# Patient Record
Sex: FEMALE | Race: BLACK OR AFRICAN AMERICAN | NOT HISPANIC OR LATINO | Employment: OTHER | URBAN - METROPOLITAN AREA
[De-identification: names, ages, dates, MRNs, and addresses within clinical notes are randomized per-mention and may not be internally consistent; named-entity substitution may affect disease eponyms.]

---

## 2017-06-22 DIAGNOSIS — R09.82 POSTNASAL DRIP: ICD-10-CM

## 2017-06-22 RX ORDER — MONTELUKAST SODIUM 10 MG/1
TABLET ORAL
Qty: 90 TABLET | Refills: 0 | Status: SHIPPED | OUTPATIENT
Start: 2017-06-22 | End: 2020-03-18 | Stop reason: SDUPTHER

## 2019-05-01 ENCOUNTER — OFFICE VISIT (OUTPATIENT)
Dept: INTERNAL MEDICINE | Facility: CLINIC | Age: 69
End: 2019-05-01
Payer: MEDICARE

## 2019-05-01 VITALS
BODY MASS INDEX: 44.01 KG/M2 | SYSTOLIC BLOOD PRESSURE: 126 MMHG | WEIGHT: 273.81 LBS | HEIGHT: 66 IN | TEMPERATURE: 98 F | HEART RATE: 94 BPM | DIASTOLIC BLOOD PRESSURE: 75 MMHG | RESPIRATION RATE: 16 BRPM

## 2019-05-01 DIAGNOSIS — J45.909 ASTHMA, UNSPECIFIED ASTHMA SEVERITY, UNSPECIFIED WHETHER COMPLICATED, UNSPECIFIED WHETHER PERSISTENT: ICD-10-CM

## 2019-05-01 DIAGNOSIS — Z12.31 ENCOUNTER FOR SCREENING MAMMOGRAM FOR MALIGNANT NEOPLASM OF BREAST: ICD-10-CM

## 2019-05-01 DIAGNOSIS — E55.9 VITAMIN D DEFICIENCY: ICD-10-CM

## 2019-05-01 DIAGNOSIS — M85.9 DISORDER OF BONE DENSITY AND STRUCTURE, UNSPECIFIED: ICD-10-CM

## 2019-05-01 DIAGNOSIS — I10 ESSENTIAL HYPERTENSION: ICD-10-CM

## 2019-05-01 DIAGNOSIS — I10 HYPERTENSION, UNSPECIFIED TYPE: Chronic | ICD-10-CM

## 2019-05-01 DIAGNOSIS — Z00.00 ANNUAL PHYSICAL EXAM: Primary | ICD-10-CM

## 2019-05-01 DIAGNOSIS — R73.03 PRE-DIABETES: ICD-10-CM

## 2019-05-01 DIAGNOSIS — E66.01 MORBID OBESITY: ICD-10-CM

## 2019-05-01 PROCEDURE — 90670 PNEUMOCOCCAL CONJUGATE VACCINE 13-VALENT LESS THAN 5YO & GREATER THAN: ICD-10-PCS | Mod: S$GLB,,, | Performed by: INTERNAL MEDICINE

## 2019-05-01 PROCEDURE — 3078F DIAST BP <80 MM HG: CPT | Mod: CPTII,GC,S$GLB, | Performed by: STUDENT IN AN ORGANIZED HEALTH CARE EDUCATION/TRAINING PROGRAM

## 2019-05-01 PROCEDURE — G0009 ADMIN PNEUMOCOCCAL VACCINE: HCPCS | Mod: S$GLB,,, | Performed by: INTERNAL MEDICINE

## 2019-05-01 PROCEDURE — 99387 INIT PM E/M NEW PAT 65+ YRS: CPT | Mod: 25,GC,S$GLB, | Performed by: STUDENT IN AN ORGANIZED HEALTH CARE EDUCATION/TRAINING PROGRAM

## 2019-05-01 PROCEDURE — 3074F SYST BP LT 130 MM HG: CPT | Mod: CPTII,GC,S$GLB, | Performed by: STUDENT IN AN ORGANIZED HEALTH CARE EDUCATION/TRAINING PROGRAM

## 2019-05-01 PROCEDURE — 99999 PR PBB SHADOW E&M-EST. PATIENT-LVL V: CPT | Mod: PBBFAC,GC,, | Performed by: STUDENT IN AN ORGANIZED HEALTH CARE EDUCATION/TRAINING PROGRAM

## 2019-05-01 PROCEDURE — 3078F PR MOST RECENT DIASTOLIC BLOOD PRESSURE < 80 MM HG: ICD-10-PCS | Mod: CPTII,GC,S$GLB, | Performed by: STUDENT IN AN ORGANIZED HEALTH CARE EDUCATION/TRAINING PROGRAM

## 2019-05-01 PROCEDURE — G0009 PNEUMOCOCCAL CONJUGATE VACCINE 13-VALENT LESS THAN 5YO & GREATER THAN: ICD-10-PCS | Mod: S$GLB,,, | Performed by: INTERNAL MEDICINE

## 2019-05-01 PROCEDURE — 3074F PR MOST RECENT SYSTOLIC BLOOD PRESSURE < 130 MM HG: ICD-10-PCS | Mod: CPTII,GC,S$GLB, | Performed by: STUDENT IN AN ORGANIZED HEALTH CARE EDUCATION/TRAINING PROGRAM

## 2019-05-01 PROCEDURE — 99999 PR PBB SHADOW E&M-EST. PATIENT-LVL V: ICD-10-PCS | Mod: PBBFAC,GC,, | Performed by: STUDENT IN AN ORGANIZED HEALTH CARE EDUCATION/TRAINING PROGRAM

## 2019-05-01 PROCEDURE — 90670 PCV13 VACCINE IM: CPT | Mod: S$GLB,,, | Performed by: INTERNAL MEDICINE

## 2019-05-01 PROCEDURE — 99387 PR PREVENTIVE VISIT,NEW,65 & OVER: ICD-10-PCS | Mod: 25,GC,S$GLB, | Performed by: STUDENT IN AN ORGANIZED HEALTH CARE EDUCATION/TRAINING PROGRAM

## 2019-05-01 RX ORDER — FLUOXETINE 10 MG/1
20 CAPSULE ORAL DAILY
COMMUNITY
End: 2019-05-01 | Stop reason: SDUPTHER

## 2019-05-01 RX ORDER — ALBUTEROL SULFATE 90 UG/1
2 AEROSOL, METERED RESPIRATORY (INHALATION) EVERY 6 HOURS PRN
Qty: 8 G | Refills: 2 | Status: SHIPPED | OUTPATIENT
Start: 2019-05-01 | End: 2021-05-04

## 2019-05-01 RX ORDER — ERGOCALCIFEROL 1.25 MG/1
50000 CAPSULE ORAL
Qty: 30 CAPSULE | Refills: 2 | Status: SHIPPED | OUTPATIENT
Start: 2019-05-01 | End: 2020-02-26 | Stop reason: SDUPTHER

## 2019-05-01 RX ORDER — FLUOXETINE 10 MG/1
20 CAPSULE ORAL DAILY
Qty: 30 CAPSULE | Refills: 2 | Status: SHIPPED | OUTPATIENT
Start: 2019-05-01 | End: 2019-05-01 | Stop reason: SDUPTHER

## 2019-05-01 NOTE — PROGRESS NOTES
69 y.o. former smoker, non- alcohol drinker here for annual exam.     Last seen by PCP, last year in September 2018, Dr. Arriaga. Switched because insurance changed. From [place], but originally from [place]. Lives mostly in [place], but has children who she visits regularly in [place] and [place].   Would like to establish care with PCP at [facility] today. No fasting labs available, first encounter.     Low vitamin D- supplements with ergo  Elevated A1c 6.4/IZABEL/obesity/prediabetes  - Has a CPAP at home. Does not use it. Dropped it and worried it is broken.   Asthma- fluticasone/montelukast, No PFTs  - has been tested for it. Has been controlled.   - Never had a exacerbation.   - Does not have rescue inhalor at home. But does report that she has not needed one.     Cardiovascular:  Cholesterol (q5yr>19yo):   Hyperlipidemia: High Total cholesterol (), not on statin  TC: 208 in 2014 LDL: labs pending HDL: labs pending   HTN: 126/75 in office, does not measure BP at home. She does have a forearm cuff at home but never uses. home meds: Hyzaar- 100/25mg    Risk: [id number] pack-year smoker, quit 20 years, at least 5 cigarettes a day for 30 years, Drinks alcohol rarely  Current ASCVD 10- year risk: Based on Lipid Panel 2014 and assumption of pre-diabetes--->24.1%, HIGH RISK  Prior stents, CABG, caths: Has had cath assessment 2 years ago, in [place]. Unclear if had stable CAD at that time. May have been after having a stress test.   Fhx: + CHF and bad coronary artery disease (Dad, and brother). Brother has stents (2-3), 73 yo. Dad- passed away from CHF.     Endocrine:  Diabetes: was prediabetic, Current A1c (40-70 yearly): 6.4 (4 years ago)   Diabetes Medications: None currently  Glucose at home: NA  Thyroid: None    Health Maintenance:  Recent hospital/ED admission: None recent  Eye exam: Does not have one.   Mammogram (50-75yo): Last Mammogram was 2-3 years ago. It was normal on previous imaging. Has had a  biopsy for a nodule/mass on the Right breast.   WWE: Dr. Langley. If she needs visits with her, she typically calls to make an appointment.   Colonoscopy:Has had one in 2017, she reports that it was normal, no polyps.  Was also in  [place]. No longer takes Aspirin due to prior history of GI bleed.   DEXA (F>66 yo, M >69yo, M&F 50-68 yo with risk factors):   Exercise: Does not exercise. She admits that she is lazy and sometimes doesn't get out.   Diet: Trying to eat better. Tried cutting out carbs and sugar.   Hep C- not previously tested.     Vaccines:  Influenza (everyone, yearly): Did not get it last year.   Tetanus: Tdap x 1 UTD, Td booster ([id number]) UTD    Pneumonia (vaccinated with PPSV23 after 64yo):  Pneumovax (PPSV23)(>64yo): 5/6/2015  Prevnar (PCV13) (at least 1 year after PPSV23): Will give today    Shingrex (Recomb, 2-6 months between doses): Dose 1 has not had , Dose 2 has not had     ADL's: Yes, able to do all things  Memory: No problems   Mental health: Good. Normal. Poor sleep.   Advance Directives: Not addressed.   Nutrition: Eats well. Working on diet.   Gait: Able to walk.   Safety: Yes feels save  Urinary incontinence: None    Review of Systems   Constitutional: Negative for chills, fever, malaise/fatigue and weight loss.   HENT: Negative for congestion, sinus pain and sore throat.    Eyes: Negative for blurred vision and pain.   Respiratory: Negative for cough, shortness of breath and wheezing.    Cardiovascular: Negative for chest pain, palpitations, orthopnea, claudication and leg swelling.   Gastrointestinal: Negative for abdominal pain, constipation, diarrhea, heartburn, melena, nausea and vomiting.   Genitourinary: Negative for dysuria, frequency, hematuria and urgency.   Skin: Negative for itching and rash.   Neurological: Negative for dizziness, seizures, loss of consciousness and headaches.   Endo/Heme/Allergies: Negative for environmental allergies and polydipsia. Does not bruise/bleed  easily.   Psychiatric/Behavioral: Negative for depression. The patient is not nervous/anxious.          Current Outpatient Medications:     ergocalciferol (VITAMIN D2) 50,000 unit Cap, Take 50,000 Units by mouth every 7 days., Disp: , Rfl:     fluticasone (FLONASE) 50 mcg/actuation nasal spray, 1 spray by Each Nare route once daily., Disp: 16 g, Rfl: 11    losartan-hydrochlorothiazide 100-25 mg (HYZAAR) 100-25 mg per tablet, Take 1 tablet by mouth once daily., Disp: 30 tablet, Rfl: 6    montelukast (SINGULAIR) 10 mg tablet, TAKE 1 TABLET BY MOUTH EVERY EVENING, Disp: 90 tablet, Rfl: 0    pantoprazole (PROTONIX) 20 MG tablet, Take 2 tablets (40 mg total) by mouth once daily. for 14 days, Disp: 14 tablet, Rfl: 0    Past Medical History:   Diagnosis Date    Asthma     Hyperlipidemia     Hypertension     Pre-diabetes      Past Surgical History:   Procedure Laterality Date    PARTIAL HYSTERECTOMY      uterus removed     Social History     Socioeconomic History    Marital status: Single     Spouse name: Not on file    Number of children: Not on file    Years of education: Not on file    Highest education level: Not on file   Occupational History    Not on file   Social Needs    Financial resource strain: Not on file    Food insecurity:     Worry: Not on file     Inability: Not on file    Transportation needs:     Medical: Not on file     Non-medical: Not on file   Tobacco Use    Smoking status: Former Smoker     Last attempt to quit: 1999     Years since quittin.9    Smokeless tobacco: Never Used   Substance and Sexual Activity    Alcohol use: Yes     Comment: socially/ rare    Drug use: No    Sexual activity: Not Currently   Lifestyle    Physical activity:     Days per week: Not on file     Minutes per session: Not on file    Stress: Not on file   Relationships    Social connections:     Talks on phone: Not on file     Gets together: Not on file     Attends Caodaism service: Not on  file     Active member of club or organization: Not on file     Attends meetings of clubs or organizations: Not on file     Relationship status: Not on file   Other Topics Concern    Not on file   Social History Narrative    Not on file     Review of patient's allergies indicates:  No Known Allergies  Amanda James had no medications administered during this visit.    Vitals:    05/01/19 1259   BP: 126/75   Pulse: 94   Resp: 16   Temp: 98 °F (36.7 °C)     Physical Exam   Constitutional: She is oriented to person, place, and time. She appears well-developed. No distress.   Morbidly obese   HENT:   Head: Normocephalic.   Eyes: Pupils are equal, round, and reactive to light. Conjunctivae and EOM are normal. No scleral icterus.   Neck: No JVD present.   Cardiovascular: Normal rate and regular rhythm.   No murmur heard.  Pulmonary/Chest: Effort normal and breath sounds normal. She has no wheezes. She has no rales.   Abdominal: Soft.   Musculoskeletal: Normal range of motion. She exhibits edema.   1+ pitting edema     Neurological: She is alert and oriented to person, place, and time. No cranial nerve deficit. Coordination normal.   Dizzy with standing due to imbalance   Psychiatric: She has a normal mood and affect.   Nursing note and vitals reviewed.      Ms James is a 70 yo woman here for annual physical exam.     Assessment:  -Annual physical exam  -Morbid obesity  -Hypertension, unspecified type  -Pre-diabetes  -Vitamin D deficiency  -Hypertension   -Encounter for screening mammogram for malignant neoplasm of breast   -Disorder of bone density and structure, unspecified   -Asthma  -----------------------------------------------------------------------  Plan:    Annual physical exam  -     Comprehensive metabolic panel; Future  -     CBC auto differential; Future  -     TSH; Future  -     Lipid panel; Future  -     HEMOGLOBIN A1C; Future  -     MICROALBUMIN / CREATININE RATIO URINE  -     Urinalysis w/reflex  to Microscopic; Future  -     Hepatitis C antibody; Future  -     Mammo Digital Screening Bilateral With CAD; Future  -     Ambulatory Referral to Nutrition - [facility]  -     DXA Bone Density Appendicular Skeleton; Future  -     Urinalysis w/reflex to Microscopic  - Plan for health maintenance- mammogram, DEXA  - Hepatitis C screening with fasting labs  - Recommended to see optometry for annual exam- patient will call to make appointment  - Referral to nutrition/[facility]  - Will re-evaluate HbA1c- if indicated will refer to diabetic education.   - Prevnar 13 today.     Morbid obesity  - Couselled on diet and nutrition. Encouraged to move/walk at least 3 times a week for 20 minutes at a time  - Referred to [facility]    Hypertension, unspecified type  -     Urinalysis w/reflex to Microscopic; Future  -     Urinalysis w/reflex to Microscopic  - Controlled today. Continue home BP medication, hyzaar    Pre-diabetes  -     Lipid panel; Future  -     HEMOGLOBIN A1C; Future    Vitamin D deficiency  -     Vitamin D; Future    Hypertension   -     TSH; Future  -     Lipid panel; Future    Encounter for screening mammogram for malignant neoplasm of breast   -     Mammo Digital Screening Bilateral With CAD; Future    Disorder of bone density and structure, unspecified   -     DXA Bone Density Appendicular Skeleton; Future    Asthma  - No official diagnosis, never had PFTs/spirometry  - Currently reports no exacerbations while on montelukast and flonase  - Continue above. Provided rescue inhaler.   - Consider pulmonary consult if patient becomes symptomatic.     Other orders  -     ergocalciferol (VITAMIN D2) 50,000 unit Cap; Take 1 capsule (50,000 Units total) by mouth every 7 days.  -     albuterol (VENTOLIN HFA) 90 mcg/actuation inhaler; Inhale 2 puffs into the lungs every 6 (six) hours as needed for Wheezing. Rescue  -     FLUoxetine 10 MG capsule; Take 2 capsules (20 mg total) by mouth once daily.  -     (In Office  Administered) Pneumococcal Conjugate Vaccine (13 Valent) (IM)    - Follow up in 6 months for management of chronic medical conditions. Will update with lab results.     Onofre Jarvis MD  PGY2 Medicine

## 2019-05-07 RX ORDER — FLUOXETINE 10 MG/1
CAPSULE ORAL
Qty: 180 CAPSULE | Refills: 2 | Status: SHIPPED | OUTPATIENT
Start: 2019-05-07 | End: 2020-08-28 | Stop reason: SDUPTHER

## 2019-05-19 ENCOUNTER — OFFICE VISIT (OUTPATIENT)
Dept: URGENT CARE | Facility: CLINIC | Age: 69
End: 2019-05-19
Payer: MEDICARE

## 2019-05-19 VITALS
TEMPERATURE: 97 F | OXYGEN SATURATION: 99 % | DIASTOLIC BLOOD PRESSURE: 79 MMHG | RESPIRATION RATE: 18 BRPM | HEIGHT: 65 IN | BODY MASS INDEX: 45.48 KG/M2 | SYSTOLIC BLOOD PRESSURE: 139 MMHG | WEIGHT: 273 LBS | HEART RATE: 100 BPM

## 2019-05-19 DIAGNOSIS — M54.50 ACUTE LEFT-SIDED LOW BACK PAIN WITHOUT SCIATICA: Primary | ICD-10-CM

## 2019-05-19 DIAGNOSIS — M62.830 BACK SPASM: ICD-10-CM

## 2019-05-19 DIAGNOSIS — R35.0 URINARY FREQUENCY: ICD-10-CM

## 2019-05-19 DIAGNOSIS — E66.01 CLASS 3 SEVERE OBESITY WITH BODY MASS INDEX (BMI) OF 45.0 TO 49.9 IN ADULT, UNSPECIFIED OBESITY TYPE, UNSPECIFIED WHETHER SERIOUS COMORBIDITY PRESENT: ICD-10-CM

## 2019-05-19 LAB
BILIRUB UR QL STRIP: NEGATIVE
GLUCOSE UR QL STRIP: NEGATIVE
KETONES UR QL STRIP: NEGATIVE
LEUKOCYTE ESTERASE UR QL STRIP: NEGATIVE
PH, POC UA: 7 (ref 5–8)
POC BLOOD, URINE: NEGATIVE
POC NITRATES, URINE: NEGATIVE
PROT UR QL STRIP: NEGATIVE
SP GR UR STRIP: 1.01 (ref 1–1.03)
UROBILINOGEN UR STRIP-ACNC: NEGATIVE (ref 0.1–1.1)

## 2019-05-19 PROCEDURE — 3075F SYST BP GE 130 - 139MM HG: CPT | Mod: CPTII,S$GLB,, | Performed by: NURSE PRACTITIONER

## 2019-05-19 PROCEDURE — 81003 POCT URINALYSIS, DIPSTICK, AUTOMATED, W/O SCOPE: ICD-10-PCS | Mod: QW,S$GLB,, | Performed by: NURSE PRACTITIONER

## 2019-05-19 PROCEDURE — 3078F DIAST BP <80 MM HG: CPT | Mod: CPTII,S$GLB,, | Performed by: NURSE PRACTITIONER

## 2019-05-19 PROCEDURE — 1101F PT FALLS ASSESS-DOCD LE1/YR: CPT | Mod: CPTII,S$GLB,, | Performed by: NURSE PRACTITIONER

## 2019-05-19 PROCEDURE — 1101F PR PT FALLS ASSESS DOC 0-1 FALLS W/OUT INJ PAST YR: ICD-10-PCS | Mod: CPTII,S$GLB,, | Performed by: NURSE PRACTITIONER

## 2019-05-19 PROCEDURE — 3078F PR MOST RECENT DIASTOLIC BLOOD PRESSURE < 80 MM HG: ICD-10-PCS | Mod: CPTII,S$GLB,, | Performed by: NURSE PRACTITIONER

## 2019-05-19 PROCEDURE — 81003 URINALYSIS AUTO W/O SCOPE: CPT | Mod: QW,S$GLB,, | Performed by: NURSE PRACTITIONER

## 2019-05-19 PROCEDURE — 99214 PR OFFICE/OUTPT VISIT, EST, LEVL IV, 30-39 MIN: ICD-10-PCS | Mod: 25,S$GLB,, | Performed by: NURSE PRACTITIONER

## 2019-05-19 PROCEDURE — 99214 OFFICE O/P EST MOD 30 MIN: CPT | Mod: 25,S$GLB,, | Performed by: NURSE PRACTITIONER

## 2019-05-19 PROCEDURE — 3075F PR MOST RECENT SYSTOLIC BLOOD PRESS GE 130-139MM HG: ICD-10-PCS | Mod: CPTII,S$GLB,, | Performed by: NURSE PRACTITIONER

## 2019-05-19 RX ORDER — TIZANIDINE 4 MG/1
TABLET ORAL
Qty: 20 TABLET | Refills: 0 | Status: SHIPPED | OUTPATIENT
Start: 2019-05-19 | End: 2021-05-04

## 2019-05-19 RX ORDER — FLUOXETINE 10 MG/1
CAPSULE ORAL
Qty: 180 CAPSULE | Refills: 2 | OUTPATIENT
Start: 2019-05-19

## 2019-05-19 RX ORDER — TIZANIDINE 4 MG/1
4 TABLET ORAL EVERY 12 HOURS PRN
Qty: 20 TABLET | Refills: 0 | Status: SHIPPED | OUTPATIENT
Start: 2019-05-19 | End: 2019-05-19 | Stop reason: SDUPTHER

## 2019-05-19 NOTE — PATIENT INSTRUCTIONS
Please take 1,000 mg of acetaminophen (Tylenol) every 8 hours as needed for pain.    If your pain worsens, or if you develop numbness or tingling, please follow up with your Primary Provider.    If you develop numbness of your upper inner thighs and incontinence ( involuntary urinating or defecating on yourself), please call 911 and go immediately to the Emergency Department.      Back Pain (Acute or Chronic)    Back pain is one of the most common problems. The good news is that most people feel better in 1 to 2 weeks, and most of the rest in 1 to 2 months. Most people can remain active.  People experience and describe pain differently; not everyone is the same.  · The pain can be sharp, stabbing, shooting, aching, cramping or burning.  · Movement, standing, bending, lifting, sitting, or walking may worsen pain.  · It can be localized to one spot or area, or it can be more generalized.  · It can spread or radiate upwards, to the front, or go down your arms or legs (sciatica).  · It can cause muscle spasm.  Most of the time, mechanical problems with the muscles or spine cause the pain. Mechanical problems are usually caused by an injury to the muscles or ligaments. While illness can cause back pain, it is usually not caused by a serious illness. Mechanical problems include:   · Physical activity such as sports, exercise, work, or normal activity  · Overexertion, lifting, pushing, pulling incorrectly or too aggressively  · Sudden twisting, bending, or stretching from an accident, or accidental movement  · Poor posture  · Stretching or moving wrong, without noticing pain at the time  · Poor coordination, lack of regular exercise (check with your doctor about this)  · Spinal disc disease or arthritis  · Stress  Pain can also be related to pregnancy, or illness like appendicitis, bladder or kidney infections, pelvic infections, and many other things.  Acute back pain usually gets better in 1 to 2 weeks. Back pain related  to disk disease, arthritis in the spinal joints or spinal stenosis (narrowing of the spinal canal) can become chronic and last for months or years.  Unless you had a physical injury (for example, a car accident or fall) X-rays are usually not needed for the initial evaluation of back pain. If pain continues and does not respond to medical treatment, X-rays and other tests may be needed.  Home care  Try these home care recommendations:  · When in bed, try to find a position of comfort. A firm mattress is best. Try lying flat on your back with pillows under your knees. You can also try lying on your side with your knees bent up towards your chest and a pillow between your knees.  · At first, do not try to stretch out the sore spots. If there is a strain, it is not like the good soreness you get after exercising without an injury. In this case, stretching may make it worse.  · Avoid prolong sitting, long car rides, or travel. This puts more stress on the lower back than standing or walking.  · During the first 24 to 72 hours after an acute injury or flare up of chronic back pain, apply an ice pack to the painful area for 20 minutes and then remove it for 20 minutes. Do this over a period of 60 to 90 minutes or several times a day. This will reduce swelling and pain. Wrap the ice pack in a thin towel or plastic to protect your skin.  · You can start with ice, then switch to heat. Heat (hot shower, hot bath, or heating pad) reduces pain and works well for muscle spasms. Heat can be applied to the painful area for 20 minutes then remove it for 20 minutes. Do this over a period of 60 to 90 minutes or several times a day. Do not sleep on a heating pad. It can lead to skin burns or tissue damage.  · You can alternate ice and heat therapy. Talk with your doctor about the best treatment for your back pain.  · Therapeutic massage can help relax the back muscles without stretching them.  · Be aware of safe lifting methods and do  not lift anything without stretching first.  Medicines  Talk to your doctor before using medicine, especially if you have other medical problems or are taking other medicines.  · You may use over-the-counter medicine as directed on the bottle to control pain, unless another pain medicine was prescribed. If you have chronic conditions like diabetes, liver or kidney disease, stomach ulcers, or gastrointestinal bleeding, or are taking blood thinners, talk to your doctor before taking any medicine.  · Be careful if you are given a prescription medicines, narcotics, or medicine for muscle spasms. They can cause drowsiness, affect your coordination, reflexes, and judgement. Do not drive or operate heavy machinery.  Follow-up care  Follow up with your healthcare provider, or as advised.   A radiologist will review any X-rays that were taken. Your provide will notify you of any new findings that may affect your care.  Call 911  Call emergency services if any of the following occur:  · Trouble breathing  · Confusion  · Very drowsy or trouble awakening  · Fainting or loss of consciousness  · Rapid or very slow heart rate  · Loss of bowel or bladder control  When to seek medical advice  Call your healthcare provider right away if any of these occur:   · Pain becomes worse or spreads to your legs  · Weakness or numbness in one or both legs  · Numbness in the groin or genital area  Date Last Reviewed: 7/1/2016  © 7111-8469 [employer] [address] [place] [address], [place] [address]. All rights reserved. This information is not intended as a substitute for professional medical care. Always follow your healthcare professional's instructions.

## 2019-05-19 NOTE — PROGRESS NOTES
"Subjective:       Patient ID: Amanda James is a 69 y.o. female.    Vitals:  height is 5' 5" (1.651 m) and weight is 123.8 kg (273 lb). Her tympanic temperature is 97.2 °F (36.2 °C). Her blood pressure is 139/79 and her pulse is 100. Her respiration is 18 and oxygen saturation is 99%.     Chief Complaint: Back Pain    Patient presents lower back pain that radiates down to her legs that occurred 2 days ago.  She states that she stepped out of the bathtub wrong, but denies any fall.  There is a tingling shooting sensation in her left leg. She has tried a prescribe pain medication and heat for her pain, and states moderate relief.      Patient states she has a Hx of chronic knee arthritis and plantar fasciitis.  She denies radiculopathy on straight leg raise.      Patient states she had similar episode in past that was UTI.  She does also endorses increase in urinary frequency.  No fever. No N/V/D.    Back Pain   This is a new problem. The current episode started in the past 7 days. The problem occurs intermittently. The problem has been gradually worsening since onset. The pain is present in the lumbar spine and sacro-iliac. The quality of the pain is described as shooting. The pain radiates to the left knee, left thigh and right thigh. The pain is at a severity of 7/10. The pain is severe. The pain is the same all the time. Associated symptoms include leg pain and tingling. Pertinent negatives include no abdominal pain, bladder incontinence, bowel incontinence, chest pain, dysuria, fever, headaches, numbness, paresis, paresthesias, pelvic pain, perianal numbness, weakness or weight loss. Risk factors include obesity. She has tried heat for the symptoms. The treatment provided moderate relief.       Constitution: Negative for fatigue and fever.   Cardiovascular: Negative for chest pain.   Gastrointestinal: Negative for abdominal pain and bowel incontinence.   Genitourinary: Negative for dysuria, urgency, bladder " incontinence, hematuria and pelvic pain.   Musculoskeletal: Positive for back pain. Negative for muscle cramps and history of spine disorder.   Skin: Negative for rash.   Neurological: Positive for tingling. Negative for coordination disturbances, headaches and numbness.       Objective:      Physical Exam   Constitutional: She is oriented to person, place, and time. Vital signs are normal. She appears well-developed and well-nourished. She is active and cooperative. No distress.   HENT:   Head: Normocephalic and atraumatic.   Right Ear: External ear normal.   Left Ear: External ear normal.   Nose: Nose normal.   Mouth/Throat: Mucous membranes are normal.   Eyes: Conjunctivae and lids are normal.   Neck: Trachea normal, normal range of motion, full passive range of motion without pain and phonation normal. Neck supple.   Cardiovascular: Normal rate, regular rhythm, normal heart sounds, intact distal pulses and normal pulses.   Pulmonary/Chest: Effort normal and breath sounds normal. No stridor. No respiratory distress. She has no wheezes.   Abdominal: Soft. Normal appearance and bowel sounds are normal. She exhibits no abdominal bruit, no pulsatile midline mass and no mass.   Musculoskeletal: She exhibits no edema or deformity.        Lumbar back: She exhibits spasm. She exhibits normal range of motion, no tenderness, no bony tenderness, no swelling and no edema.   Neurological: She is alert and oriented to person, place, and time. She has normal strength and normal reflexes. No sensory deficit.   Skin: Skin is warm, dry and intact. She is not diaphoretic.   Psychiatric: She has a normal mood and affect. Her speech is normal and behavior is normal. Judgment and thought content normal. Cognition and memory are normal.   Nursing note and vitals reviewed.      POCT Urinalysis, Dipstick, Automated, W/O Scope   Order: [id number]   Status:  Final result   Visible to patient:  No (Not Released) Next appt:  None Dx:  Acute  left-sided low back pain withou...    Ref Range & Units 15:24   POC Blood, Urine Negative Negative    POC Bilirubin, Urine Negative Negative    POC Urobilinogen, Urine 0.1 - 1.1 negative    POC Ketones, Urine Negative Negative    POC Protein, Urine Negative Negative    POC Nitrates, Urine Negative Negative    POC Glucose, Urine Negative Negative    pH, UA 5 - 8 7.0    POC Specific Gravity, Urine 1.003 - 1.029 1.015    POC Leukocytes, Urine Negative Negative    Resulting Agency  [facility] [place]             Assessment:       1. Acute left-sided low back pain without sciatica    2. Urinary frequency    3. Back spasm    4. Class 3 severe obesity with body mass index (BMI) of 45.0 to 49.9 in adult, unspecified obesity type, unspecified whether serious comorbidity present        Plan:         Acute left-sided low back pain without sciatica  -     POCT Urinalysis, Dipstick, Automated, W/O Scope    Urinary frequency  -     POCT Urinalysis, Dipstick, Automated, W/O Scope    Back spasm  -     tiZANidine (ZANAFLEX) 4 MG tablet; Take 1 tablet (4 mg total) by mouth every 12 (twelve) hours as needed.  Dispense: 20 tablet; Refill: 0    Class 3 severe obesity with body mass index (BMI) of 45.0 to 49.9 in adult, unspecified obesity type, unspecified whether serious comorbidity present      Patient Instructions   Please take 1,000 mg of acetaminophen (Tylenol) every 8 hours as needed for pain.    If your pain worsens, or if you develop numbness or tingling, please follow up with your Primary Provider.    If you develop numbness of your upper inner thighs and incontinence ( involuntary urinating or defecating on yourself), please call 911 and go immediately to the Emergency Department.      Back Pain (Acute or Chronic)    Back pain is one of the most common problems. The good news is that most people feel better in 1 to 2 weeks, and most of the rest in 1 to 2 months. Most people can remain active.  People experience and describe pain  differently; not everyone is the same.  · The pain can be sharp, stabbing, shooting, aching, cramping or burning.  · Movement, standing, bending, lifting, sitting, or walking may worsen pain.  · It can be localized to one spot or area, or it can be more generalized.  · It can spread or radiate upwards, to the front, or go down your arms or legs (sciatica).  · It can cause muscle spasm.  Most of the time, mechanical problems with the muscles or spine cause the pain. Mechanical problems are usually caused by an injury to the muscles or ligaments. While illness can cause back pain, it is usually not caused by a serious illness. Mechanical problems include:   · Physical activity such as sports, exercise, work, or normal activity  · Overexertion, lifting, pushing, pulling incorrectly or too aggressively  · Sudden twisting, bending, or stretching from an accident, or accidental movement  · Poor posture  · Stretching or moving wrong, without noticing pain at the time  · Poor coordination, lack of regular exercise (check with your doctor about this)  · Spinal disc disease or arthritis  · Stress  Pain can also be related to pregnancy, or illness like appendicitis, bladder or kidney infections, pelvic infections, and many other things.  Acute back pain usually gets better in 1 to 2 weeks. Back pain related to disk disease, arthritis in the spinal joints or spinal stenosis (narrowing of the spinal canal) can become chronic and last for months or years.  Unless you had a physical injury (for example, a car accident or fall) X-rays are usually not needed for the initial evaluation of back pain. If pain continues and does not respond to medical treatment, X-rays and other tests may be needed.  Home care  Try these home care recommendations:  · When in bed, try to find a position of comfort. A firm mattress is best. Try lying flat on your back with pillows under your knees. You can also try lying on your side with your knees bent  up towards your chest and a pillow between your knees.  · At first, do not try to stretch out the sore spots. If there is a strain, it is not like the good soreness you get after exercising without an injury. In this case, stretching may make it worse.  · Avoid prolong sitting, long car rides, or travel. This puts more stress on the lower back than standing or walking.  · During the first 24 to 72 hours after an acute injury or flare up of chronic back pain, apply an ice pack to the painful area for 20 minutes and then remove it for 20 minutes. Do this over a period of 60 to 90 minutes or several times a day. This will reduce swelling and pain. Wrap the ice pack in a thin towel or plastic to protect your skin.  · You can start with ice, then switch to heat. Heat (hot shower, hot bath, or heating pad) reduces pain and works well for muscle spasms. Heat can be applied to the painful area for 20 minutes then remove it for 20 minutes. Do this over a period of 60 to 90 minutes or several times a day. Do not sleep on a heating pad. It can lead to skin burns or tissue damage.  · You can alternate ice and heat therapy. Talk with your doctor about the best treatment for your back pain.  · Therapeutic massage can help relax the back muscles without stretching them.  · Be aware of safe lifting methods and do not lift anything without stretching first.  Medicines  Talk to your doctor before using medicine, especially if you have other medical problems or are taking other medicines.  · You may use over-the-counter medicine as directed on the bottle to control pain, unless another pain medicine was prescribed. If you have chronic conditions like diabetes, liver or kidney disease, stomach ulcers, or gastrointestinal bleeding, or are taking blood thinners, talk to your doctor before taking any medicine.  · Be careful if you are given a prescription medicines, narcotics, or medicine for muscle spasms. They can cause drowsiness,  affect your coordination, reflexes, and judgement. Do not drive or operate heavy machinery.  Follow-up care  Follow up with your healthcare provider, or as advised.   A radiologist will review any X-rays that were taken. Your provide will notify you of any new findings that may affect your care.  Call 911  Call emergency services if any of the following occur:  · Trouble breathing  · Confusion  · Very drowsy or trouble awakening  · Fainting or loss of consciousness  · Rapid or very slow heart rate  · Loss of bowel or bladder control  When to seek medical advice  Call your healthcare provider right away if any of these occur:   · Pain becomes worse or spreads to your legs  · Weakness or numbness in one or both legs  · Numbness in the groin or genital area  Date Last Reviewed: 7/1/2016  © 5531-8793 [employer]. [address] [place] [address], [place] [address]. All rights reserved. This information is not intended as a substitute for professional medical care. Always follow your healthcare professional's instructions.

## 2019-05-22 ENCOUNTER — HOSPITAL ENCOUNTER (EMERGENCY)
Facility: HOSPITAL | Age: 69
Discharge: HOME | End: 2019-05-22
Attending: EMERGENCY MEDICINE
Payer: MEDICARE

## 2019-05-22 VITALS
WEIGHT: 274 LBS | OXYGEN SATURATION: 99 % | SYSTOLIC BLOOD PRESSURE: 142 MMHG | RESPIRATION RATE: 16 BRPM | HEIGHT: 64 IN | HEART RATE: 92 BPM | TEMPERATURE: 98 F | BODY MASS INDEX: 46.78 KG/M2 | DIASTOLIC BLOOD PRESSURE: 65 MMHG

## 2019-05-22 DIAGNOSIS — M54.50 ACUTE BILATERAL LOW BACK PAIN WITHOUT SCIATICA: Primary | ICD-10-CM

## 2019-05-22 DIAGNOSIS — M79.605 PAIN IN BOTH LOWER EXTREMITIES: ICD-10-CM

## 2019-05-22 DIAGNOSIS — M79.604 PAIN IN BOTH LOWER EXTREMITIES: ICD-10-CM

## 2019-05-22 PROCEDURE — 99281 EMR DPT VST MAYX REQ PHY/QHP: CPT

## 2019-05-22 PROCEDURE — 99284 PR EMERGENCY DEPT VISIT,LEVEL IV: ICD-10-PCS | Mod: ,,, | Performed by: EMERGENCY MEDICINE

## 2019-05-22 PROCEDURE — 99284 EMERGENCY DEPT VISIT MOD MDM: CPT | Mod: ,,, | Performed by: EMERGENCY MEDICINE

## 2019-05-22 NOTE — ED TRIAGE NOTES
"Amanda James, a 69 y.o. female presents to the ED w/ complaint of low back pain and bilateral hip pain that radiates into the ankles. Pain started Saturday. Patient was lifting grandchildren prior this happening. Last night, patient heard a " pop/click in the right hip".    The pain is described as aching, sharp and stabbing    Nothing makes the pain better    Ambulation makes the pain worse         Triage note:  Chief Complaint   Patient presents with    Hip Pain     Pt to ER via POV c/o bilateral hip pain radiating to legs (right worse than left) starting on Saturday.     Leg Pain     Review of patient's allergies indicates:  No Known Allergies  Past Medical History:   Diagnosis Date    Asthma     Hyperlipidemia     Hypertension     IZABEL (obstructive sleep apnea)     Pre-diabetes      \  "

## 2019-05-22 NOTE — MEDICAL/APP STUDENT
History     Chief Complaint   Patient presents with    Hip Pain     Pt to ER via POV c/o bilateral hip pain radiating to legs (right worse than left) starting on Saturday.     Leg Pain     Ms James is a 68yo female with hypertension and plantar fasciitis who presents with 5 day history of progressive R hip and left foot pain. Pain is burning in nature and 7/10 in intensity, pain has minimal relief with tylenol and flexaril, improves with rest and warm shower. Worsened by walking and movement. Does not endorse incontinence, saddle anaesthesa, numbness or tingling. Pain does not radiate.           Past Medical History:   Diagnosis Date    Asthma     Hyperlipidemia     Hypertension     IZABEL (obstructive sleep apnea)     Pre-diabetes        Past Surgical History:   Procedure Laterality Date    BREAST BIOPSY Left     benign    BREAST LUMPECTOMY Left     PARTIAL HYSTERECTOMY      uterus removed       Family History   Problem Relation Age of Onset    Hypertension Mother     Coronary artery disease Father     Coronary artery disease Brother     Breast cancer Paternal Aunt        Social History     Tobacco Use    Smoking status: Former Smoker     Last attempt to quit: 1999     Years since quittin.0    Smokeless tobacco: Never Used   Substance Use Topics    Alcohol use: Yes     Comment: socially/ rare    Drug use: No       Review of Systems   Constitutional: Negative for appetite change, chills, fatigue, fever and unexpected weight change.   HENT: Negative for congestion, rhinorrhea and sore throat.    Eyes: Negative for visual disturbance.   Respiratory: Negative for cough and shortness of breath.    Cardiovascular: Negative for chest pain, palpitations and leg swelling.   Gastrointestinal: Negative for abdominal distention, abdominal pain, constipation, diarrhea, nausea and vomiting.   Genitourinary: Negative for difficulty urinating, dysuria and urgency.   Musculoskeletal: Positive for gait  "problem.   Neurological: Positive for weakness. Negative for tremors, light-headedness, numbness and headaches.       Physical Exam   BP (!) 142/65   Pulse 92   Temp 98.3 °F (36.8 °C) (Oral)   Resp 16   Ht 5' 4" (1.626 m)   Wt 124.3 kg (274 lb)   SpO2 99%   Breastfeeding? No   BMI 47.03 kg/m²     Physical Exam    Constitutional: She is Obese . No distress.   HENT:   Head: Normocephalic and atraumatic.   Cardiovascular: Normal rate, regular rhythm, S1 normal and S2 normal.   No murmur heard.  Pulmonary/Chest: Breath sounds normal.   Abdominal: Soft. Bowel sounds are normal. There is no tenderness.   Musculoskeletal:        Right hip: She exhibits normal strength, no swelling, no crepitus and no deformity.        Left ankle: She exhibits no swelling, no deformity and normal pulse. No tenderness.        Legs:       Left foot: There is no tenderness, no bony tenderness, no swelling, no crepitus and no deformity.   Neurological: She is alert and oriented to person, place, and time. No sensory deficit. Gait (Antalgic gait) abnormal.   Strength 5/5 in all muscle groups. Reflexes 2+ throughout. Straight leg test negative bilaterally.          ED Course         "

## 2019-05-22 NOTE — DISCHARGE INSTRUCTIONS
Take 1000mg of Tylenol every 6 hours.    Our goal in the emergency department is to always give you outstanding care and exceptional service. You may receive a survey by mail or e-mail in the next week regarding your experience in our ED. We would greatly appreciate your completing and returning the survey. Your feedback provides us with a way to recognize our staff who give very good care and it helps us learn how to improve when your experience was below our aspiration of excellence.

## 2019-05-22 NOTE — ED PROVIDER NOTES
"Encounter Date: 5/22/2019    SCRIBE #1 NOTE: I, Son Stephani, am scribing for, and in the presence of,  Dr. Olguin . I have scribed the following portions of the note - Other sections scribed: HPI ROS PE .       History     Chief Complaint   Patient presents with    Hip Pain     Pt to ER via POV c/o bilateral hip pain radiating to legs (right worse than left) starting on Saturday.     Leg Pain     Ms James is a 70yo female with hypertension and plantar fasciitis presenting with 5 day history of progressive R hip and left foot pain. Patient states that one morning she had made a "sharp turn" with one of her foot while getting out of the tub and that the pain began two days afterwards. However, she denies falling or any trauma to her extremities. She also notes that she has been recently been "lifting more than she use to" for the past few days. Patient had taken tylenol, flexeril, which gave her minimal relief for the first two days. However, last night it gave her no relief and states that she heard a "pop" in her hip and is concern. Her pain worsens by walking and movement. Patient notes she has not tried ibuprofen for the pain because she was instructed to avoid it. Denies history of neuropathy. Does not endorse incontinence, saddle anaesthesia, numbness or tingling. Pain is burning in nature and 7/10 in intensity. Pain does not radiate. Patient denies nausea, vomiting, diarrhea, fever, cough, shortness of breath, chest pain, abdominal pain, or dysuria.  A ten point review of systems was completed and is negative except as documented above. Patient denies any other acute medical complaint. The patients available PMH, PSH, medications, allergies, and triage vital signs were reviewed just prior to their medical evaluation.     The history is provided by the patient and medical records.     Review of patient's allergies indicates:  No Known Allergies  Past Medical History:   Diagnosis Date    Asthma     " Hyperlipidemia     Hypertension     IZABEL (obstructive sleep apnea)     Pre-diabetes      Past Surgical History:   Procedure Laterality Date    BREAST BIOPSY Left     benign    BREAST LUMPECTOMY Left     PARTIAL HYSTERECTOMY      uterus removed     Family History   Problem Relation Age of Onset    Hypertension Mother     Coronary artery disease Father     Coronary artery disease Brother     Breast cancer Paternal Aunt      Social History     Tobacco Use    Smoking status: Former Smoker     Last attempt to quit: 1999     Years since quittin.0    Smokeless tobacco: Never Used   Substance Use Topics    Alcohol use: Yes     Comment: socially/ rare    Drug use: No     Review of Systems   Constitutional: Negative for fever.   HENT: Negative for sore throat.    Eyes: Negative for visual disturbance.   Respiratory: Negative for cough and shortness of breath.    Cardiovascular: Negative for chest pain and leg swelling.   Gastrointestinal: Negative for abdominal pain, diarrhea, nausea and vomiting.   Genitourinary: Negative for dysuria.   Musculoskeletal: Positive for arthralgias and back pain. Negative for joint swelling and neck pain.        + bilateral hip pain  + ankle pain  + knee pain    Skin: Negative for rash and wound.   Allergic/Immunologic: Negative for immunocompromised state.   Neurological: Negative for syncope.   Psychiatric/Behavioral: Negative for confusion.   All other systems reviewed and are negative.      Physical Exam     Initial Vitals [19 0626]   BP Pulse Resp Temp SpO2   (!) 142/65 92 16 98.3 °F (36.8 °C) 99 %      MAP       --         Physical Exam    Nursing note and vitals reviewed.  Constitutional: She appears well-developed and well-nourished. She is not diaphoretic. No distress.   HENT:   Head: Normocephalic and atraumatic.   Nose: Nose normal.   Eyes: Conjunctivae are normal. Right eye exhibits no discharge. Left eye exhibits no discharge.   Neck: Normal range of  motion. Neck supple.   nontender   Cardiovascular: Normal rate, regular rhythm, normal heart sounds and intact distal pulses. Exam reveals no gallop and no friction rub.    No murmur heard.  Pulmonary/Chest: Breath sounds normal. No respiratory distress. She has no wheezes. She has no rhonchi. She has no rales.   Abdominal: Soft. She exhibits no distension. There is no tenderness. There is no rebound and no guarding.   Musculoskeletal: Normal range of motion. She exhibits no edema or tenderness.   Test strength and range of motion of all joints of the lower extremities. No pain with range of motion. No weakness. Bilateral knees are negative Lachman's, normal varus and valgus stress, normal anterior and posterior drawer.  No evidence of fracture or ligamentous rupture of the bilateral lower extremities   Neurological: She is alert and oriented to person, place, and time. She has normal strength. GCS score is 15. GCS eye subscore is 4. GCS verbal subscore is 5. GCS motor subscore is 6.   Gait testing normal   Skin: Skin is warm and dry. No rash noted. No erythema.   Psychiatric: She has a normal mood and affect. Her behavior is normal. Judgment and thought content normal.         ED Course   Procedures  Labs Reviewed - No data to display       Imaging Results    None          Medical Decision Making:   History:   I obtained history from: someone other than patient.  Old Medical Records: I decided to obtain old medical records.  ED Management:  69-year-old female presents with bilateral back pain and bilateral pain to the lower extremities. No trauma or acute inciting event.  Vitals unremarkable. Extensive physical exam benign.  No indication for imaging.  We had an extensive conversation at bedside regarding etiology and pain control.  She will schedule her Tylenol every 6 hr.  She has follow-up pending with her primary care physician.  Patient may benefit from physical therapy for persistent symptoms.  Patient will  return to ED for worsening symptoms, inability to eat/drink, fever greater than 100.4, or any other concerns.  Did bedside teaching with return precautions.  All questions answered.  The patient acknowledges understanding.  Gave verbal discharge instructions.            Scribe Attestation:   Scribe #1: I performed the above scribed service and the documentation accurately describes the services I performed. I attest to the accuracy of the note.               Clinical Impression:       ICD-10-CM ICD-9-CM   1. Acute bilateral low back pain without sciatica M54.5 724.2     338.19   2. Pain in both lower extremities M79.604 729.5    M79.605          Disposition:   Disposition: Discharged  Condition: Stable                        Cortez Olguin MD  05/22/19 0816

## 2019-06-19 RX ORDER — FLUOXETINE 10 MG/1
CAPSULE ORAL
Qty: 180 CAPSULE | Refills: 2 | OUTPATIENT
Start: 2019-06-19

## 2019-06-23 DIAGNOSIS — M62.830 BACK SPASM: ICD-10-CM

## 2019-06-24 RX ORDER — TIZANIDINE 4 MG/1
TABLET ORAL
Qty: 20 TABLET | Refills: 0 | OUTPATIENT
Start: 2019-06-24

## 2019-07-01 RX ORDER — FLUOXETINE 10 MG/1
CAPSULE ORAL
Qty: 30 CAPSULE | Refills: 0 | OUTPATIENT
Start: 2019-07-01

## 2019-07-22 ENCOUNTER — PATIENT MESSAGE (OUTPATIENT)
Dept: INTERNAL MEDICINE | Facility: CLINIC | Age: 69
End: 2019-07-22

## 2019-07-22 ENCOUNTER — TELEPHONE (OUTPATIENT)
Dept: INTERNAL MEDICINE | Facility: CLINIC | Age: 69
End: 2019-07-22

## 2019-07-22 DIAGNOSIS — D50.9 IRON DEFICIENCY ANEMIA, UNSPECIFIED IRON DEFICIENCY ANEMIA TYPE: Primary | ICD-10-CM

## 2019-07-22 RX ORDER — FERROUS GLUCONATE 324(38)MG
324 TABLET ORAL
Qty: 60 TABLET | Refills: 2 | Status: SHIPPED | OUTPATIENT
Start: 2019-07-22 | End: 2021-05-04

## 2019-07-22 NOTE — TELEPHONE ENCOUNTER
Advise recent lab showed worsening anemia which is probably low iron.   Will require further evaluation.  Needs repeat lab.  Rx sent in for iron supplementation.  She will need to see our Gastro Dept for evaluation.

## 2019-07-24 NOTE — TELEPHONE ENCOUNTER
Spoke with pt to advise of her lab results and MD recommendations.    Verbalized understanding and requested to have labs at [facility] tomorrow.

## 2019-07-25 ENCOUNTER — TELEPHONE (OUTPATIENT)
Dept: INTERNAL MEDICINE | Facility: CLINIC | Age: 69
End: 2019-07-25

## 2019-07-25 DIAGNOSIS — D64.9 ANEMIA, UNSPECIFIED TYPE: Primary | ICD-10-CM

## 2019-07-25 NOTE — TELEPHONE ENCOUNTER
Spoke with Cassi who advises that the orders are visible but infusion policy states that they cannot schedule the pt for a transfusion until they have a consent in hand.    Left a message for the pt to call the office as we are working to get her scheduled as per her conversation with Dr. Jacome.

## 2019-07-25 NOTE — TELEPHONE ENCOUNTER
----- Message from En Patel sent at 7/25/2019  2:17 PM CDT -----  Contact: SELF  [phone]  Patient is returning a phone call.  Who left a message for the patient: Ml  Does patient know what this is regarding:  TEST  Comments:

## 2019-07-25 NOTE — TELEPHONE ENCOUNTER
Spoke with pt to advise.    Verbalized understanding and will come in tomorrow to sign the consent.

## 2019-07-25 NOTE — TELEPHONE ENCOUNTER
I spoke with the patient today by phone and discussed her recent lab data and her worsening anemia is noted on lab data today.  My iron studies are pending but she has microcytic and it is almost certainly are not low iron anemia.  She states she has similar episode a few years ago but states no obvious source was found though it is not clear if she had full GI workup at that time.  In view of the significant anemia I recommend that we proceed with transfusion at this time in anticipation of endoscopic studies.  She does have an appointment with our Gastroenterology Department in several days and she will follow up that as well.  She has started her iron therapy as well.  She understands and is willing to proceed with transfusion and further evaluation.

## 2019-07-26 ENCOUNTER — HOSPITAL ENCOUNTER (OUTPATIENT)
Facility: HOSPITAL | Age: 69
Discharge: HOME | DRG: 812 | End: 2019-07-27
Attending: EMERGENCY MEDICINE | Admitting: EMERGENCY MEDICINE
Payer: MEDICARE

## 2019-07-26 ENCOUNTER — TELEPHONE (OUTPATIENT)
Dept: GASTROENTEROLOGY | Facility: CLINIC | Age: 69
End: 2019-07-26

## 2019-07-26 DIAGNOSIS — D64.9 LOW HEMOGLOBIN: ICD-10-CM

## 2019-07-26 DIAGNOSIS — I10 HYPERTENSION, UNSPECIFIED TYPE: Chronic | ICD-10-CM

## 2019-07-26 DIAGNOSIS — D50.9 IRON DEFICIENCY ANEMIA, UNSPECIFIED IRON DEFICIENCY ANEMIA TYPE: ICD-10-CM

## 2019-07-26 DIAGNOSIS — D64.9 SYMPTOMATIC ANEMIA: Primary | ICD-10-CM

## 2019-07-26 DIAGNOSIS — R10.13 EPIGASTRIC PAIN: ICD-10-CM

## 2019-07-26 DIAGNOSIS — D64.9 ANEMIA, UNSPECIFIED TYPE: Primary | ICD-10-CM

## 2019-07-26 DIAGNOSIS — Z87.19 HISTORY OF GASTROINTESTINAL ULCER: ICD-10-CM

## 2019-07-26 DIAGNOSIS — E55.9 VITAMIN D DEFICIENCY: ICD-10-CM

## 2019-07-26 DIAGNOSIS — R06.02 SOB (SHORTNESS OF BREATH): ICD-10-CM

## 2019-07-26 LAB
ABO + RH BLD: NORMAL
ALBUMIN SERPL BCP-MCNC: 3.2 G/DL (ref 3.5–5.2)
ALP SERPL-CCNC: 82 U/L (ref 55–135)
ALT SERPL W/O P-5'-P-CCNC: 10 U/L (ref 10–44)
ANION GAP SERPL CALC-SCNC: 8 MMOL/L (ref 8–16)
AST SERPL-CCNC: 15 U/L (ref 10–40)
BASOPHILS # BLD AUTO: 0.05 K/UL (ref 0–0.2)
BASOPHILS NFR BLD: 0.8 % (ref 0–1.9)
BILIRUB SERPL-MCNC: 0.4 MG/DL (ref 0.1–1)
BLD GP AB SCN CELLS X3 SERPL QL: NORMAL
BLD PROD TYP BPU: NORMAL
BLD PROD TYP BPU: NORMAL
BLOOD UNIT EXPIRATION DATE: NORMAL
BLOOD UNIT EXPIRATION DATE: NORMAL
BLOOD UNIT TYPE CODE: 6200
BLOOD UNIT TYPE CODE: 6200
BLOOD UNIT TYPE: NORMAL
BLOOD UNIT TYPE: NORMAL
BUN SERPL-MCNC: 17 MG/DL (ref 8–23)
CALCIUM SERPL-MCNC: 9.8 MG/DL (ref 8.7–10.5)
CHLORIDE SERPL-SCNC: 109 MMOL/L (ref 95–110)
CO2 SERPL-SCNC: 24 MMOL/L (ref 23–29)
CODING SYSTEM: NORMAL
CODING SYSTEM: NORMAL
CREAT SERPL-MCNC: 0.7 MG/DL (ref 0.5–1.4)
DIFFERENTIAL METHOD: ABNORMAL
DISPENSE STATUS: NORMAL
DISPENSE STATUS: NORMAL
EOSINOPHIL # BLD AUTO: 0.2 K/UL (ref 0–0.5)
EOSINOPHIL NFR BLD: 2.8 % (ref 0–8)
ERYTHROCYTE [DISTWIDTH] IN BLOOD BY AUTOMATED COUNT: 21.8 % (ref 11.5–14.5)
EST. GFR  (AFRICAN AMERICAN): >60 ML/MIN/1.73 M^2
EST. GFR  (NON AFRICAN AMERICAN): >60 ML/MIN/1.73 M^2
GLUCOSE SERPL-MCNC: 116 MG/DL (ref 70–110)
HCT VFR BLD AUTO: 21.1 % (ref 37–48.5)
HGB BLD-MCNC: 5.3 G/DL (ref 12–16)
IMM GRANULOCYTES # BLD AUTO: 0.03 K/UL (ref 0–0.04)
IMM GRANULOCYTES NFR BLD AUTO: 0.5 % (ref 0–0.5)
LYMPHOCYTES # BLD AUTO: 1.8 K/UL (ref 1–4.8)
LYMPHOCYTES NFR BLD: 27.2 % (ref 18–48)
MCH RBC QN AUTO: 17.1 PG (ref 27–31)
MCHC RBC AUTO-ENTMCNC: 25.1 G/DL (ref 32–36)
MCV RBC AUTO: 68 FL (ref 82–98)
MONOCYTES # BLD AUTO: 0.5 K/UL (ref 0.3–1)
MONOCYTES NFR BLD: 7.1 % (ref 4–15)
NEUTROPHILS # BLD AUTO: 4 K/UL (ref 1.8–7.7)
NEUTROPHILS NFR BLD: 61.6 % (ref 38–73)
NRBC BLD-RTO: 1 /100 WBC
PLATELET # BLD AUTO: 360 K/UL (ref 150–350)
PMV BLD AUTO: 11.8 FL (ref 9.2–12.9)
POTASSIUM SERPL-SCNC: 3.9 MMOL/L (ref 3.5–5.1)
PROT SERPL-MCNC: 6.5 G/DL (ref 6–8.4)
RBC # BLD AUTO: 3.1 M/UL (ref 4–5.4)
SODIUM SERPL-SCNC: 141 MMOL/L (ref 136–145)
TRANS ERYTHROCYTES VOL PATIENT: NORMAL ML
TRANS ERYTHROCYTES VOL PATIENT: NORMAL ML
WBC # BLD AUTO: 6.51 K/UL (ref 3.9–12.7)

## 2019-07-26 PROCEDURE — P9021 RED BLOOD CELLS UNIT: HCPCS

## 2019-07-26 PROCEDURE — G0378 HOSPITAL OBSERVATION PER HR: HCPCS

## 2019-07-26 PROCEDURE — 86920 COMPATIBILITY TEST SPIN: CPT

## 2019-07-26 PROCEDURE — 93005 ELECTROCARDIOGRAM TRACING: CPT

## 2019-07-26 PROCEDURE — 99291 CRITICAL CARE FIRST HOUR: CPT | Mod: ,,, | Performed by: EMERGENCY MEDICINE

## 2019-07-26 PROCEDURE — 25000003 PHARM REV CODE 250: Performed by: INTERNAL MEDICINE

## 2019-07-26 PROCEDURE — 93010 EKG 12-LEAD: ICD-10-PCS | Mod: ,,, | Performed by: INTERNAL MEDICINE

## 2019-07-26 PROCEDURE — 99285 EMERGENCY DEPT VISIT HI MDM: CPT

## 2019-07-26 PROCEDURE — 85025 COMPLETE CBC W/AUTO DIFF WBC: CPT

## 2019-07-26 PROCEDURE — 99223 1ST HOSP IP/OBS HIGH 75: CPT | Mod: ,,, | Performed by: INTERNAL MEDICINE

## 2019-07-26 PROCEDURE — 80053 COMPREHEN METABOLIC PANEL: CPT

## 2019-07-26 PROCEDURE — 99223 PR INITIAL HOSPITAL CARE,LEVL III: ICD-10-PCS | Mod: ,,, | Performed by: INTERNAL MEDICINE

## 2019-07-26 PROCEDURE — 99291 PR CRITICAL CARE, E/M 30-74 MINUTES: ICD-10-PCS | Mod: ,,, | Performed by: EMERGENCY MEDICINE

## 2019-07-26 PROCEDURE — 11000001 HC ACUTE MED/SURG PRIVATE ROOM

## 2019-07-26 PROCEDURE — 86901 BLOOD TYPING SEROLOGIC RH(D): CPT

## 2019-07-26 PROCEDURE — 36430 TRANSFUSION BLD/BLD COMPNT: CPT

## 2019-07-26 PROCEDURE — 93010 ELECTROCARDIOGRAM REPORT: CPT | Mod: ,,, | Performed by: INTERNAL MEDICINE

## 2019-07-26 RX ORDER — ALBUTEROL SULFATE 2.5 MG/.5ML
2.5 SOLUTION RESPIRATORY (INHALATION) EVERY 6 HOURS PRN
Status: DISCONTINUED | OUTPATIENT
Start: 2019-07-26 | End: 2019-07-27 | Stop reason: HOSPADM

## 2019-07-26 RX ORDER — SODIUM CHLORIDE 0.9 % (FLUSH) 0.9 %
10 SYRINGE (ML) INJECTION
Status: DISCONTINUED | OUTPATIENT
Start: 2019-07-26 | End: 2019-07-26

## 2019-07-26 RX ORDER — ONDANSETRON 2 MG/ML
4 INJECTION INTRAMUSCULAR; INTRAVENOUS EVERY 12 HOURS PRN
Status: DISCONTINUED | OUTPATIENT
Start: 2019-07-26 | End: 2019-07-27 | Stop reason: HOSPADM

## 2019-07-26 RX ORDER — FLUOXETINE HYDROCHLORIDE 20 MG/1
20 CAPSULE ORAL DAILY
Status: DISCONTINUED | OUTPATIENT
Start: 2019-07-27 | End: 2019-07-27 | Stop reason: HOSPADM

## 2019-07-26 RX ORDER — ACETAMINOPHEN 500 MG
500 TABLET ORAL EVERY 6 HOURS PRN
Status: DISCONTINUED | OUTPATIENT
Start: 2019-07-26 | End: 2019-07-27 | Stop reason: HOSPADM

## 2019-07-26 RX ORDER — SODIUM CHLORIDE 0.9 % (FLUSH) 0.9 %
5 SYRINGE (ML) INJECTION
Status: DISCONTINUED | OUTPATIENT
Start: 2019-07-26 | End: 2019-07-27 | Stop reason: HOSPADM

## 2019-07-26 RX ORDER — OLMESARTAN MEDOXOMIL AND HYDROCHLOROTHIAZIDE 40/12.5 40; 12.5 MG/1; MG/1
TABLET ORAL
Refills: 3 | COMMUNITY
Start: 2019-06-18 | End: 2020-03-19 | Stop reason: SDUPTHER

## 2019-07-26 RX ORDER — POLYETHYLENE GLYCOL 3350 17 G/17G
17 POWDER, FOR SOLUTION ORAL DAILY
Status: DISCONTINUED | OUTPATIENT
Start: 2019-07-26 | End: 2019-07-27 | Stop reason: HOSPADM

## 2019-07-26 RX ORDER — PANTOPRAZOLE SODIUM 40 MG/1
40 TABLET, DELAYED RELEASE ORAL DAILY
Status: DISCONTINUED | OUTPATIENT
Start: 2019-07-27 | End: 2019-07-27 | Stop reason: HOSPADM

## 2019-07-26 RX ORDER — ERGOCALCIFEROL 1.25 MG/1
50000 CAPSULE ORAL
Status: DISCONTINUED | OUTPATIENT
Start: 2019-07-31 | End: 2019-07-27 | Stop reason: HOSPADM

## 2019-07-26 RX ORDER — ONDANSETRON 8 MG/1
8 TABLET, ORALLY DISINTEGRATING ORAL EVERY 8 HOURS PRN
Status: DISCONTINUED | OUTPATIENT
Start: 2019-07-26 | End: 2019-07-27 | Stop reason: HOSPADM

## 2019-07-26 RX ORDER — HYDROCODONE BITARTRATE AND ACETAMINOPHEN 500; 5 MG/1; MG/1
TABLET ORAL
Status: DISCONTINUED | OUTPATIENT
Start: 2019-07-26 | End: 2019-07-27 | Stop reason: HOSPADM

## 2019-07-26 RX ORDER — LOSARTAN POTASSIUM AND HYDROCHLOROTHIAZIDE 25; 100 MG/1; MG/1
1 TABLET ORAL DAILY
Status: DISCONTINUED | OUTPATIENT
Start: 2019-07-27 | End: 2019-07-27 | Stop reason: HOSPADM

## 2019-07-26 RX ORDER — FERROUS GLUCONATE 324(37.5)
324 TABLET ORAL
Status: DISCONTINUED | OUTPATIENT
Start: 2019-07-26 | End: 2019-07-27 | Stop reason: HOSPADM

## 2019-07-26 RX ORDER — MONTELUKAST SODIUM 10 MG/1
10 TABLET ORAL NIGHTLY
Status: DISCONTINUED | OUTPATIENT
Start: 2019-07-26 | End: 2019-07-27 | Stop reason: HOSPADM

## 2019-07-26 RX ADMIN — MONTELUKAST 10 MG: 10 TABLET, FILM COATED ORAL at 08:07

## 2019-07-26 RX ADMIN — FERROUS GLUCONATE TAB 324 MG (37.5 MG ELEMENTAL IRON) 324 MG: 324 (37.5 FE) TAB at 06:07

## 2019-07-26 RX ADMIN — POLYETHYLENE GLYCOL 3350 17 G: 17 POWDER, FOR SOLUTION ORAL at 02:07

## 2019-07-26 NOTE — H&P
Hospital Medicine  History and Physical Exam    Patient Name; Amanda James  MRN: [id number]  Team: [facility] Rosa Maria Banegas MD  Admit Date: 7/26/2019  IVY  07/27/19  Principal Problem:  Symptomatic anemia   Patient information was obtained from patient, relative(s), past medical records and ER records.   Primary care Physician: Hilary Taveras DO  Code status: Full Code    HPI: 69 y.o. female presented to the ER with past medical history of GI ulcers, chronic hip / joint pain, IZABEL, hypertension.  She was in her usual state of stable health until the insidious onset of severe iron deficiency anemia beginning at least 4 - 5 months, possibly a year, prior to admission with rapidly worsening course.  Pertinent associated symptoms include shortness of breath on exertion, fatigue. Patient has a history of GI bleeding ~ 3 years ago, treated in [place] (EGD, incomplete C-scope). She currently denies any recent evidence of melena, BRBPR, or hematemesis. Also denied AUB and epistaxis. Patient has appointment scheduled with GI on Monday. Endorses use of NSAIDs and ASA for joint pain in April of this year.    Past Medical History: Patient has a past medical history of Asthma, Hyperlipidemia, Hypertension, IZABEL (obstructive sleep apnea), and Pre-diabetes.    Past Surgical History: Patient has a past surgical history that includes Partial hysterectomy; Breast lumpectomy (Left); and Breast biopsy (Left).    Social History: Patient reports that she quit smoking about 20 years ago. She has never used smokeless tobacco. She reports that she drinks alcohol. She reports that she does not use drugs.    Family History: family history includes Breast cancer in her paternal aunt; Coronary artery disease in her brother and father; Hypertension in her mother.    Medications:   No current facility-administered medications on file prior to encounter.      Current Outpatient Medications on File Prior to Encounter   Medication Sig  Dispense Refill    albuterol (VENTOLIN HFA) 90 mcg/actuation inhaler Inhale 2 puffs into the lungs every 6 (six) hours as needed for Wheezing. Rescue 8 g 2    ergocalciferol (VITAMIN D2) 50,000 unit Cap Take 1 capsule (50,000 Units total) by mouth every 7 days. 30 capsule 2    ferrous gluconate (FERGON) 324 MG tablet Take 1 tablet (324 mg total) by mouth 2 times daily 2 hours after meal. 60 tablet 2    FLUoxetine 10 MG capsule TAKE 2 CAPSULES(20 MG) BY MOUTH EVERY  capsule 2    fluticasone (FLONASE) 50 mcg/actuation nasal spray 1 spray by Each Nare route once daily. 16 g 11    losartan-hydrochlorothiazide 100-25 mg (HYZAAR) 100-25 mg per tablet Take 1 tablet by mouth once daily. 30 tablet 6    montelukast (SINGULAIR) 10 mg tablet TAKE 1 TABLET BY MOUTH EVERY EVENING 90 tablet 0    olmesartan-hydrochlorothiazide (BENICAR HCT) 40-12.5 mg Tab TK 1 T PO D  3    pantoprazole (PROTONIX) 20 MG tablet Take 2 tablets (40 mg total) by mouth once daily. for 14 days 14 tablet 0    tiZANidine (ZANAFLEX) 4 MG tablet TAKE 1 TABLET(4 MG) BY MOUTH EVERY 12 HOURS AS NEEDED 20 tablet 0       Allergies: Patient has No Known Allergies.    Review of Systems   Constitutional: Negative.  Negative for fever.   HENT: Negative.    Eyes: Negative.    Respiratory: Positive for shortness of breath.    Cardiovascular: Negative.    Gastrointestinal: Negative.  Negative for abdominal pain, blood in stool, heartburn, melena, nausea and vomiting.   Genitourinary: Negative.    Musculoskeletal: Positive for joint pain.   Skin: Negative.    Neurological: Negative.    Psychiatric/Behavioral: Positive for depression.       Physical Exam:  Temp:  [98 °F (36.7 °C)-98.4 °F (36.9 °C)]   Pulse:  [75-90]   Resp:  [15-19]   BP: (104-134)/(53-65)   SpO2:  [98 %-100 %]   Body mass index is 46.86 kg/m².     Physical Exam   Constitutional:  Non-toxic appearance. No distress.   HENT:   Mouth/Throat: Mucous membranes are not pale and not cyanotic.    Eyes: Conjunctivae and lids are normal. Pupils are equal.   Cardiovascular: S1 normal and S2 normal.   Pulmonary/Chest: Effort normal and breath sounds normal.   Abdominal: Soft. Bowel sounds are normal. There is no tenderness.   Neurological: She is alert. She is not disoriented.   Skin: Skin is warm and dry.   Psychiatric: Affect normal.         Intake/Output Summary (Last 24 hours) at 7/26/2019 1716  Last data filed at 7/26/2019 1546  Gross per 24 hour   Intake 492.5 ml   Output --   Net 492.5 ml       Significant Labs and Imaging:    Recent Labs   Lab 07/25/19  1144 07/26/19  1015   WBC 5.30 6.51   HGB 5.1* 5.3*   HCT 19.3* 21.1*    360*     Recent Labs   Lab 07/26/19  1015      K 3.9      CO2 24   BUN 17   CREATININE 0.7   *   CALCIUM 9.8   ALKPHOS 82   ALT 10   AST 15   ALBUMIN 3.2*   PROT 6.5   BILITOT 0.4     A1C:   Recent Labs   Lab 05/07/19  0901   HGBA1C 5.5     TSH:   Recent Labs   Lab 05/07/19  0901   TSH 4.12       Baselines:  Hemoglobin   Date Value Ref Range Status   07/26/2019 5.3 (LL) 12.0 - 16.0 g/dL Final   07/25/2019 5.1 (LL) 12.0 - 16.0 g/dL Final   05/07/2019 6.3 (L) 12.0 - 16.0 g/dL Final   10/24/2018 9.1 (L) 12.0 - 16.0 g/dL Final   06/06/2014 12.1 12.0 - 16.0 g/dL Final     Creatinine   Date Value Ref Range Status   07/26/2019 0.7 0.5 - 1.4 mg/dL Final   05/07/2019 0.8 0.5 - 1.4 mg/dL Final   10/24/2018 0.7 0.5 - 1.4 mg/dL Final   06/06/2014 0.8 0.5 - 1.4 mg/dL Final   08/07/2008 0.7 0.5 - 1.4 mg/dl Final       Radiology/Cardiac:  CXR (personally interpreted): No infiltrate  Radiographic tests reviewed chest xray: possible hiatal hernia  EKG (personally interpreted): normal sinus rhythm    Inpatient Medications prescribed for management of current Problems:   Scheduled Meds:    [START ON 7/31/2019] ergocalciferol  50,000 Units Oral Q7 Days    ferrous gluconate  324 mg Oral BID PC    [START ON 7/27/2019] FLUoxetine  20 mg Oral Daily    [START ON 7/27/2019]  losartan-hydrochlorothiazide 100-25 mg  1 tablet Oral Daily    montelukast  10 mg Oral QHS    [START ON 7/27/2019] pantoprazole  40 mg Oral Daily    polyethylene glycol  17 g Oral Daily     Continuous Infusions:   As Needed: sodium chloride, acetaminophen, albuterol sulfate, ondansetron, ondansetron, sodium chloride 0.9%    Active Hospital Problems    Diagnosis  POA    *Symptomatic anemia [D64.9]  Yes    SOB (shortness of breath) [R06.02]  Yes    Iron deficiency anemia [D50.9]  Yes    History of gastrointestinal ulcer [Z87.19]  Not Applicable    Vitamin D deficiency [E55.9]  Yes    HTN (hypertension) [I10]  Yes     Chronic    Morbid obesity [E66.01]  Yes      Resolved Hospital Problems   No resolved problems to display.       Overview:  69 y.o. female with past medical history of GI ulcers, chronic hip / joint pain, IZABEL, hypertension presented with symptomatic anemia.     Symptomatic anemia  SOB (shortness of breath)  Iron deficiency anemia with h/o GI bleed.  Patient transfused 2 units PRBCs. Iron studies reflect iron deficiency. Has GI appointment in 3 days. Hemodynamically stable. No evidence of active bleeding. Check FOBT.Restart PPI. Avoid NSAIDs.     HTN (hypertension)  Continuing current management with ARB/HCTZ.     Morbid obesity  Vitamin D deficiency  Continuing home medications.    DVT Prophylaxis:   Anticoagulants   Medication Route Frequency       Discharge plan and follow up  Home or Self Care      Provider  Rosa Maria Banegas MD  Department of Hospital Medicine   [facility] [place]

## 2019-07-26 NOTE — ED PROVIDER NOTES
Encounter Date: 2019       History     Chief Complaint   Patient presents with    Abnormal Lab     low h/h drawn yest     69 years old female patient with past medical history of HTN, healed peptic ulcer, asthma, and rectal polyp. Came to the ED because she has low H&H (hb 5.3) done yesterday. Patient has shortness of breath, fatigue, dizziness, lightheadedness when standing, and occasional palpitations. Patient denies chest pain, sweating, recent trauma or injury, blood in stool or urine, abdominal pain, n/v, chills, fever, recent sick contact, leg swelling, and leg pain. She had a similar episode in  when she was in [place], however no site of active bleeding in EGD and C-scope, managed by blood transfusion only.        Review of patient's allergies indicates:  No Known Allergies  Past Medical History:   Diagnosis Date    Asthma     Hyperlipidemia     Hypertension     IZABEL (obstructive sleep apnea)     Pre-diabetes      Past Surgical History:   Procedure Laterality Date    BREAST BIOPSY Left     benign    BREAST LUMPECTOMY Left     PARTIAL HYSTERECTOMY      uterus removed     Family History   Problem Relation Age of Onset    Hypertension Mother     Coronary artery disease Father     Coronary artery disease Brother     Breast cancer Paternal Aunt      Social History     Tobacco Use    Smoking status: Former Smoker     Last attempt to quit: 1999     Years since quittin.1    Smokeless tobacco: Never Used   Substance Use Topics    Alcohol use: Yes     Comment: socially/ rare    Drug use: No     Review of Systems   Constitutional: Positive for fatigue. Negative for activity change, appetite change, chills, diaphoresis, fever and unexpected weight change.   HENT: Negative.    Eyes: Negative.    Respiratory: Positive for shortness of breath. Negative for apnea, cough, chest tightness and wheezing.    Cardiovascular: Negative.    Gastrointestinal: Positive for constipation (Whenever she  get her Vitamin D pill, she get coinstipated for 2 days). Negative for abdominal distention, abdominal pain, anal bleeding, blood in stool, diarrhea, nausea, rectal pain and vomiting.   Endocrine: Negative.    Genitourinary: Negative.    Musculoskeletal: Positive for arthralgias (Bilateral hip pain) and neck pain (cervical pain that radiates to the right shoulder 2-3/10). Negative for neck stiffness.   Skin: Negative.    Allergic/Immunologic: Negative.    Neurological: Positive for dizziness and light-headedness. Negative for tremors, syncope, weakness, numbness and headaches.   Hematological: Negative.    Psychiatric/Behavioral: Negative.        Physical Exam     Initial Vitals [07/26/19 0947]   BP Pulse Resp Temp SpO2   (!) 112/53 81 18 98.4 °F (36.9 °C) 100 %      MAP       --         Physical Exam    Constitutional: She appears well-developed and well-nourished. She is not diaphoretic. No distress.   HENT:   Head: Normocephalic and atraumatic.   Eyes: EOM are normal. Pupils are equal, round, and reactive to light.   Pale conjuctiva   Neck: Normal range of motion. Neck supple.   Cardiovascular: Normal rate, regular rhythm, normal heart sounds and intact distal pulses.   No murmur heard.  Abdominal: Soft. Bowel sounds are normal. She exhibits no distension. There is no tenderness.   Musculoskeletal: Normal range of motion. She exhibits no edema or tenderness.   Neurological: She is alert and oriented to person, place, and time. GCS score is 15. GCS eye subscore is 4. GCS verbal subscore is 5. GCS motor subscore is 6.   Skin: Skin is warm and dry. There is pallor.   Psychiatric: She has a normal mood and affect. Her behavior is normal. Judgment and thought content normal.         ED Course   Critical Care  Date/Time: 7/26/2019 1:09 PM  Performed by: Agustin Youngblood III, MD  Authorized by: Rosa Maria Banegas MD   Total critical care time (exclusive of procedural time) : 30 minutes  Critical care was necessary to treat  or prevent imminent or life-threatening deterioration of the following conditions: circulatory failure (Severe anemia requiring emergent transfusion).        Labs Reviewed   CBC W/ AUTO DIFFERENTIAL - Abnormal; Notable for the following components:       Result Value    RBC 3.10 (*)     Hemoglobin 5.3 (*)     Hematocrit 21.1 (*)     Mean Corpuscular Volume 68 (*)     Mean Corpuscular Hemoglobin 17.1 (*)     Mean Corpuscular Hemoglobin Conc 25.1 (*)     RDW 21.8 (*)     Platelets 360 (*)     nRBC 1 (*)     All other components within normal limits    Narrative:     HGB critical result(s) called and verbal readback obtained from Minerva Andrews RN, 07/26/2019 10:38   COMPREHENSIVE METABOLIC PANEL - Abnormal; Notable for the following components:    Glucose 116 (*)     Albumin 3.2 (*)     All other components within normal limits   TYPE & SCREEN   PREPARE RBC SOFT     EKG Readings: (Independently Interpreted)   Initial Reading: No STEMI. Rhythm: Normal Sinus Rhythm. Heart Rate: 91. Ectopy: No Ectopy. Conduction: Normal. ST Segments: Normal ST Segments. T Waves: Normal. Clinical Impression: Normal Sinus Rhythm       Imaging Results          X-Ray Chest PA And Lateral (Final result)  Result time 07/26/19 11:26:04    Final result by Clinton Finnegan MD (07/26/19 11:26:04)                 Impression:      No significant cardiopulmonary abnormality or detrimental change in the appearance of the chest since 10/24/2018 is appreciated.  Retrocardiac soft tissue opacity is highly suggestive of a large hiatal hernia.      Electronically signed by: Clinton Finnegan MD  Date:    07/26/2019  Time:    11:26             Narrative:    EXAMINATION:  XR CHEST PA AND LATERAL    TECHNIQUE:  Two views of the chest were obtained, with PA and lateral projections submitted.    COMPARISON:  Comparison is made to the most recent prior chest radiograph, dated 10/24/2018.  The patient's chest radiographs earlier than that date are not currently accessible on  PACS.    FINDINGS:  Heart size is no larger than upper limit of normal, and the appearance of the cardiomediastinal silhouette demonstrates no detrimental change since the examination referenced above.  Pulmonary vascularity is normal.  Lung zones appear clear, and are free of significant airspace consolidation or volume loss, with some eventration of the anterior aspect of the right hemidiaphragm incidentally observed.  No pleural fluid.  No pneumothorax.  A sizable retrocardiac soft tissue opacity is seen which is partially air -containing, its appearance highly suggestive of a large hiatal hernia.  Minor marginal vertebral endplate spurring in the thoracic spine is incidentally observed.                                 Medical Decision Making:   History:   Old Medical Records: I decided to obtain old medical records.  Initial Assessment:   69 years old female patient with past medical history of HTN, healed peptic ulcer, asthma, and rectal polyp. Came to the ED because she has low H&H (hb 5.3) done yesterday. Patient has shortness of breath, fatigue, dizziness, lightheadedness when standing, and occasional palpitations.  Workup: cbc, cmp, ekg, CXR  IV NS bolus,   Differential Diagnosis:   GI bleed, iron deficiency anemia, anemia of chronic disease, hemolytic anemia  Independently Interpreted Test(s):   I have ordered and independently interpreted X-rays - see summary below.       <> Summary of X-Ray Reading(s): Chest x-ray:  No acute process  Clinical Tests:   Lab Tests: Ordered and Reviewed  Radiological Study: Ordered and Reviewed  Medical Tests: Ordered and Reviewed  ED Management:    11:44 AM  Guaiac test is trace positive    12:03 PM  CXR showed no lung or heart abnormalities, however it's suggestive of hiatal hernia  Admit to hospital medicine    Other:   I have discussed this case with another health care provider.       <> Summary of the Discussion: Internal medicine for admission              Attending  Attestation:   Physician Attestation Statement for Resident:  As the supervising MD   Physician Attestation Statement: I have personally seen and examined this patient.   I agree with the above history. -: Anemia and shortness of breath   As the supervising MD I agree with the above PE.    As the supervising MD I agree with the above treatment, course, plan, and disposition.                       Clinical Impression:       ICD-10-CM ICD-9-CM   1. Low hemoglobin D64.9 285.9   2. SOB (shortness of breath) R06.02 786.05                                Lionel Mccormack MD  Resident  07/26/19 1259       Agustin Youngblood III, MD  07/26/19 1310

## 2019-07-26 NOTE — ED TRIAGE NOTES
Patient reports to the ED today with reports of abnormal lab value. Patient states that she had labs drawn yesterday and was scheduled for an outpatient blood transfusion today. Patient states that she arrived for appointment and was told that her counts were too low and was told to come to the ED. H/H 5.1 and 19.3. Patient endorses weakness and SOB on exertion at this time

## 2019-07-26 NOTE — PLAN OF CARE
Problem: Adult Inpatient Plan of Care  Goal: Plan of Care Review  Outcome: Ongoing (interventions implemented as appropriate)  POC reviewed with Pt and family. VSS. No acute changes. A&Ox4.  Pt arrived to floor from ED with unit of RBC going. The second unit was administered on the floor. Pt tolerating it well. Pt denies pain. Safety checks preformed. All questions answered. Call light in reach. Will continue to monitor.

## 2019-07-27 VITALS
BODY MASS INDEX: 46.4 KG/M2 | TEMPERATURE: 98 F | WEIGHT: 271.81 LBS | HEIGHT: 64 IN | RESPIRATION RATE: 18 BRPM | HEART RATE: 79 BPM | SYSTOLIC BLOOD PRESSURE: 137 MMHG | DIASTOLIC BLOOD PRESSURE: 63 MMHG | OXYGEN SATURATION: 100 %

## 2019-07-27 PROBLEM — R06.02 SOB (SHORTNESS OF BREATH): Status: RESOLVED | Noted: 2019-07-26 | Resolved: 2019-07-27

## 2019-07-27 PROBLEM — D64.9 SYMPTOMATIC ANEMIA: Status: RESOLVED | Noted: 2019-07-26 | Resolved: 2019-07-27

## 2019-07-27 LAB
ALBUMIN SERPL BCP-MCNC: 2.8 G/DL (ref 3.5–5.2)
ANION GAP SERPL CALC-SCNC: 8 MMOL/L (ref 8–16)
BASOPHILS # BLD AUTO: 0.06 K/UL (ref 0–0.2)
BASOPHILS NFR BLD: 0.5 % (ref 0–1.9)
BLD PROD TYP BPU: NORMAL
BLOOD UNIT EXPIRATION DATE: NORMAL
BLOOD UNIT TYPE CODE: 6200
BLOOD UNIT TYPE: NORMAL
BUN SERPL-MCNC: 16 MG/DL (ref 8–23)
CALCIUM SERPL-MCNC: 9.4 MG/DL (ref 8.7–10.5)
CHLORIDE SERPL-SCNC: 109 MMOL/L (ref 95–110)
CO2 SERPL-SCNC: 24 MMOL/L (ref 23–29)
CODING SYSTEM: NORMAL
CREAT SERPL-MCNC: 0.7 MG/DL (ref 0.5–1.4)
DIFFERENTIAL METHOD: ABNORMAL
DISPENSE STATUS: NORMAL
EOSINOPHIL # BLD AUTO: 0.3 K/UL (ref 0–0.5)
EOSINOPHIL NFR BLD: 2.2 % (ref 0–8)
ERYTHROCYTE [DISTWIDTH] IN BLOOD BY AUTOMATED COUNT: 24.1 % (ref 11.5–14.5)
EST. GFR  (AFRICAN AMERICAN): >60 ML/MIN/1.73 M^2
EST. GFR  (NON AFRICAN AMERICAN): >60 ML/MIN/1.73 M^2
GLUCOSE SERPL-MCNC: 136 MG/DL (ref 70–110)
HCT VFR BLD AUTO: 25.7 % (ref 37–48.5)
HGB BLD-MCNC: 6.8 G/DL (ref 12–16)
IMM GRANULOCYTES # BLD AUTO: 0.09 K/UL (ref 0–0.04)
IMM GRANULOCYTES NFR BLD AUTO: 0.7 % (ref 0–0.5)
LYMPHOCYTES # BLD AUTO: 1.7 K/UL (ref 1–4.8)
LYMPHOCYTES NFR BLD: 13.6 % (ref 18–48)
MAGNESIUM SERPL-MCNC: 1.9 MG/DL (ref 1.6–2.6)
MCH RBC QN AUTO: 19.4 PG (ref 27–31)
MCHC RBC AUTO-ENTMCNC: 26.5 G/DL (ref 32–36)
MCV RBC AUTO: 73 FL (ref 82–98)
MONOCYTES # BLD AUTO: 0.5 K/UL (ref 0.3–1)
MONOCYTES NFR BLD: 3.8 % (ref 4–15)
NEUTROPHILS # BLD AUTO: 9.9 K/UL (ref 1.8–7.7)
NEUTROPHILS NFR BLD: 79.2 % (ref 38–73)
NRBC BLD-RTO: 1 /100 WBC
PHOSPHATE SERPL-MCNC: 2.8 MG/DL (ref 2.7–4.5)
PLATELET # BLD AUTO: 322 K/UL (ref 150–350)
PMV BLD AUTO: 11.2 FL (ref 9.2–12.9)
POTASSIUM SERPL-SCNC: 3.8 MMOL/L (ref 3.5–5.1)
RBC # BLD AUTO: 3.5 M/UL (ref 4–5.4)
SODIUM SERPL-SCNC: 141 MMOL/L (ref 136–145)
TRANS ERYTHROCYTES VOL PATIENT: NORMAL ML
WBC # BLD AUTO: 12.51 K/UL (ref 3.9–12.7)

## 2019-07-27 PROCEDURE — 25000003 PHARM REV CODE 250: Performed by: INTERNAL MEDICINE

## 2019-07-27 PROCEDURE — P9021 RED BLOOD CELLS UNIT: HCPCS

## 2019-07-27 PROCEDURE — 83735 ASSAY OF MAGNESIUM: CPT

## 2019-07-27 PROCEDURE — 36430 TRANSFUSION BLD/BLD COMPNT: CPT

## 2019-07-27 PROCEDURE — 36415 COLL VENOUS BLD VENIPUNCTURE: CPT

## 2019-07-27 PROCEDURE — 99239 HOSP IP/OBS DSCHRG MGMT >30: CPT | Mod: ,,, | Performed by: INTERNAL MEDICINE

## 2019-07-27 PROCEDURE — 80069 RENAL FUNCTION PANEL: CPT

## 2019-07-27 PROCEDURE — 85025 COMPLETE CBC W/AUTO DIFF WBC: CPT

## 2019-07-27 PROCEDURE — 99239 PR HOSPITAL DISCHARGE DAY,>30 MIN: ICD-10-PCS | Mod: ,,, | Performed by: INTERNAL MEDICINE

## 2019-07-27 PROCEDURE — G0378 HOSPITAL OBSERVATION PER HR: HCPCS

## 2019-07-27 RX ORDER — POLYETHYLENE GLYCOL 3350 17 G/17G
17 POWDER, FOR SOLUTION ORAL DAILY
Qty: 510 G | Refills: 0 | Status: SHIPPED | OUTPATIENT
Start: 2019-07-27 | End: 2021-05-04

## 2019-07-27 RX ORDER — AMOXICILLIN 250 MG
1 CAPSULE ORAL 2 TIMES DAILY
Status: DISCONTINUED | OUTPATIENT
Start: 2019-07-27 | End: 2019-07-27 | Stop reason: HOSPADM

## 2019-07-27 RX ORDER — PANTOPRAZOLE SODIUM 20 MG/1
40 TABLET, DELAYED RELEASE ORAL DAILY
Qty: 30 TABLET | Refills: 0 | Status: SHIPPED | OUTPATIENT
Start: 2019-07-27 | End: 2021-05-04

## 2019-07-27 RX ORDER — HYDROCODONE BITARTRATE AND ACETAMINOPHEN 500; 5 MG/1; MG/1
TABLET ORAL
Status: DISCONTINUED | OUTPATIENT
Start: 2019-07-27 | End: 2019-07-27 | Stop reason: HOSPADM

## 2019-07-27 RX ADMIN — PANTOPRAZOLE SODIUM 40 MG: 40 TABLET, DELAYED RELEASE ORAL at 09:07

## 2019-07-27 RX ADMIN — LOSARTAN POTASSIUM AND HYDROCHLOROTHIAZIDE 1 TABLET: 25; 100 TABLET ORAL at 09:07

## 2019-07-27 RX ADMIN — FLUOXETINE 20 MG: 20 CAPSULE ORAL at 09:07

## 2019-07-27 RX ADMIN — FERROUS GLUCONATE TAB 324 MG (37.5 MG ELEMENTAL IRON) 324 MG: 324 (37.5 FE) TAB at 09:07

## 2019-07-27 RX ADMIN — SENNOSIDES,DOCUSATE SODIUM 1 TABLET: 8.6; 5 TABLET, FILM COATED ORAL at 09:07

## 2019-07-27 RX ADMIN — POLYETHYLENE GLYCOL 3350 17 G: 17 POWDER, FOR SOLUTION ORAL at 09:07

## 2019-07-27 NOTE — DISCHARGE INSTRUCTIONS
Reviewed & discussed discharge instructions with patient/daughter. Discharged with personal belongings & daughter via private vehicle

## 2019-07-27 NOTE — PLAN OF CARE
Problem: Adult Inpatient Plan of Care  Goal: Plan of Care Review  Outcome: Ongoing (interventions implemented as appropriate)  Pt in bed resting awake and alert oriented x4 adls with stand by assist VS stable no adverse reaction to medication O2 sats 98% RA was unable to send stool culture due to pt not having a BM no c/o pain or discomfort. Call light within reach bed in lowest position safety maintained.

## 2019-07-27 NOTE — DISCHARGE SUMMARY
Discharge Summary  Hospital Medicine    Patient Name:   Amanda James  MRN:   [id number]  Attending Provider on Discharge: Rosa Maria Banegas MD  Hospital Medicine Team: [facility] MED D  Date of Admission:  7/26/2019     Date of Discharge:  7/27/2019  3:45 PM  Code status: Full Code    Active Hospital Problems    Diagnosis  POA    Iron deficiency anemia [D50.9]  Yes    History of gastrointestinal ulcer [Z87.19]  Not Applicable    Vitamin D deficiency [E55.9]  Yes    HTN (hypertension) [I10]  Yes     Chronic    Morbid obesity [E66.01]  Yes      Resolved Hospital Problems    Diagnosis Date Resolved POA    *Symptomatic anemia [D64.9] 07/27/2019 Yes    SOB (shortness of breath) [R06.02] 07/27/2019 Yes        HPI: 69 y.o. female with past medical history of GI ulcers, chronic hip / joint pain, IZABEL, hypertension presented with the insidious onset of severe iron deficiency anemia beginning at least 4 - 5 months, possibly a year, prior to admission with rapidly worsening course.  Pertinent associated symptoms include shortness of breath on exertion, fatigue. Patient has a history of GI bleeding ~ 3 years ago, treated in [place] (EGD, incomplete C-scope). She currently denies any recent evidence of melena, BRBPR, or hematemesis. Also denied AUB and epistaxis. Patient has appointment scheduled with GI on Monday. Endorses use of NSAIDs and ASA for joint pain in April of this year    Hospital Course:  patient with past medical history of GI ulcers, chronic hip / joint pain, IZABEL, hypertension presented with symptomatic anemia.      Symptomatic anemia  SOB (shortness of breath)  Iron deficiency anemia with h/o GI bleed.  Patient transfused 2 units PRBCs on admission. Iron studies reflect iron deficiency. Has GI appointment in 3 days. Hemodynamically stable. No evidence of active bleeding. Ordered FOBT. Restart PPI. Avoid NSAIDs.  Hgb remained < 7; transfused additional 1 unit PRBCs for total of 3 units.      HTN  (hypertension)  Continuing current management with ARB/HCTZ.      Morbid obesity  Vitamin D deficiency  Continuing home medications.       Recent Labs   Lab 07/25/19  1144 07/26/19  1015 07/27/19  0402   WBC 5.30 6.51 12.51   HGB 5.1* 5.3* 6.8*   HCT 19.3* 21.1* 25.7*    360* 322     Recent Labs   Lab 07/26/19  1015 07/27/19  0402    141   K 3.9 3.8    109   CO2 24 24   BUN 17 16   CREATININE 0.7 0.7   * 136*   CALCIUM 9.8 9.4   MG  --  1.9   PHOS  --  2.8     Recent Labs   Lab 07/26/19  1015 07/27/19  0402   ALKPHOS 82  --    ALT 10  --    AST 15  --    ALBUMIN 3.2* 2.8*   PROT 6.5  --    BILITOT 0.4  --            Procedures: none    Consultants: none      Medications:    Current Discharge Medication List      START taking these medications    Details   multivit-min-iron-FA-lutein (CENTRUM SILVER WOMEN) 8 mg iron-400 mcg-300 mcg Tab Take 1 tablet by mouth once daily.      polyethylene glycol (GLYCOLAX) 17 gram/dose powder Take 17 g by mouth once daily.  Qty: 510 g, Refills: 0         CONTINUE these medications which have CHANGED    Details   pantoprazole (PROTONIX) 20 MG tablet Take 2 tablets (40 mg total) by mouth once daily.  Qty: 30 tablet, Refills: 0    Associated Diagnoses: Epigastric pain         CONTINUE these medications which have NOT CHANGED    Details   albuterol (VENTOLIN HFA) 90 mcg/actuation inhaler Inhale 2 puffs into the lungs every 6 (six) hours as needed for Wheezing. Rescue  Qty: 8 g, Refills: 2      ergocalciferol (VITAMIN D2) 50,000 unit Cap Take 1 capsule (50,000 Units total) by mouth every 7 days.  Qty: 30 capsule, Refills: 2      ferrous gluconate (FERGON) 324 MG tablet Take 1 tablet (324 mg total) by mouth 2 times daily 2 hours after meal.  Qty: 60 tablet, Refills: 2      FLUoxetine 10 MG capsule TAKE 2 CAPSULES(20 MG) BY MOUTH EVERY DAY  Qty: 180 capsule, Refills: 2    Comments: **Patient requests 90 days supply**      fluticasone (FLONASE) 50 mcg/actuation  nasal spray 1 spray by Each Nare route once daily.  Qty: 16 g, Refills: 11    Associated Diagnoses: Postnasal drip      montelukast (SINGULAIR) 10 mg tablet TAKE 1 TABLET BY MOUTH EVERY EVENING  Qty: 90 tablet, Refills: 0    Comments: **Patient requests 90 days supply**  Associated Diagnoses: Postnasal drip      olmesartan-hydrochlorothiazide (BENICAR HCT) 40-12.5 mg Tab TK 1 T PO D  Refills: 3      tiZANidine (ZANAFLEX) 4 MG tablet TAKE 1 TABLET(4 MG) BY MOUTH EVERY 12 HOURS AS NEEDED  Qty: 20 tablet, Refills: 0    Comments: **Patient requests 90 days supply**  Associated Diagnoses: Back spasm         STOP taking these medications       losartan-hydrochlorothiazide 100-25 mg (HYZAAR) 100-25 mg per tablet Comments:   Reason for Stopping:               Discharge Instructions:  Discharge Procedure Orders   Diet Adult Regular     Notify your health care provider if you experience any of the following:  temperature >100.4     Notify your health care provider if you experience any of the following:  persistent nausea and vomiting or diarrhea     Notify your health care provider if you experience any of the following:  persistent dizziness, light-headedness, or visual disturbances     Notify your health care provider if you experience any of the following:  increased confusion or weakness     Notify your health care provider if you experience any of the following:  difficulty breathing or increased cough     Activity as tolerated       Discharge Condition: stable    Disposition: Home or Self Care    Indwelling Lines/Drains at time of discharge: none     Tests pending at the time of discharge: none      Time spent on the discharge of the patient including review of hospital course with the patient, reviewing discharge medications and arranging follow-up care: 45 minutes.    Discharge examination Patient was seen and examined on 7/27/2019 and determined to be suitable for discharge.    Discharge plan and follow  up:  Follow-up Information     Margi Turcios NP. Go on 7/29/2019.    Specialty:  Gastroenterology  Why:  As Scheduled, 8 AM  Contact information:  [address]  [facility] [address]  [phone]             Future Appointments   Date Time Provider Department Center   7/29/2019  8:00 AM Margi Turcios NP [facility]STR[facility] [place]       Provider  Rosa Maria Banegas MD  Department of Hospital Medicine  [facility] [place]

## 2019-07-29 ENCOUNTER — TELEPHONE (OUTPATIENT)
Dept: GASTROENTEROLOGY | Facility: CLINIC | Age: 69
End: 2019-07-29

## 2019-07-29 ENCOUNTER — OFFICE VISIT (OUTPATIENT)
Dept: GASTROENTEROLOGY | Facility: CLINIC | Age: 69
End: 2019-07-29
Payer: MEDICARE

## 2019-07-29 ENCOUNTER — TELEPHONE (OUTPATIENT)
Dept: HEMATOLOGY/ONCOLOGY | Facility: CLINIC | Age: 69
End: 2019-07-29

## 2019-07-29 VITALS
WEIGHT: 276.44 LBS | SYSTOLIC BLOOD PRESSURE: 147 MMHG | BODY MASS INDEX: 47.19 KG/M2 | DIASTOLIC BLOOD PRESSURE: 84 MMHG | HEART RATE: 80 BPM | HEIGHT: 64 IN

## 2019-07-29 DIAGNOSIS — D50.9 IRON DEFICIENCY ANEMIA, UNSPECIFIED IRON DEFICIENCY ANEMIA TYPE: Primary | ICD-10-CM

## 2019-07-29 PROCEDURE — 3077F PR MOST RECENT SYSTOLIC BLOOD PRESSURE >= 140 MM HG: ICD-10-PCS | Mod: CPTII,S$GLB,, | Performed by: NURSE PRACTITIONER

## 2019-07-29 PROCEDURE — 99999 PR PBB SHADOW E&M-EST. PATIENT-LVL IV: ICD-10-PCS | Mod: PBBFAC,,, | Performed by: NURSE PRACTITIONER

## 2019-07-29 PROCEDURE — 3077F SYST BP >= 140 MM HG: CPT | Mod: CPTII,S$GLB,, | Performed by: NURSE PRACTITIONER

## 2019-07-29 PROCEDURE — 1101F PR PT FALLS ASSESS DOC 0-1 FALLS W/OUT INJ PAST YR: ICD-10-PCS | Mod: CPTII,S$GLB,, | Performed by: NURSE PRACTITIONER

## 2019-07-29 PROCEDURE — 3079F PR MOST RECENT DIASTOLIC BLOOD PRESSURE 80-89 MM HG: ICD-10-PCS | Mod: CPTII,S$GLB,, | Performed by: NURSE PRACTITIONER

## 2019-07-29 PROCEDURE — 99214 PR OFFICE/OUTPT VISIT, EST, LEVL IV, 30-39 MIN: ICD-10-PCS | Mod: S$GLB,,, | Performed by: NURSE PRACTITIONER

## 2019-07-29 PROCEDURE — 1101F PT FALLS ASSESS-DOCD LE1/YR: CPT | Mod: CPTII,S$GLB,, | Performed by: NURSE PRACTITIONER

## 2019-07-29 PROCEDURE — 99214 OFFICE O/P EST MOD 30 MIN: CPT | Mod: S$GLB,,, | Performed by: NURSE PRACTITIONER

## 2019-07-29 PROCEDURE — 3079F DIAST BP 80-89 MM HG: CPT | Mod: CPTII,S$GLB,, | Performed by: NURSE PRACTITIONER

## 2019-07-29 PROCEDURE — 99999 PR PBB SHADOW E&M-EST. PATIENT-LVL IV: CPT | Mod: PBBFAC,,, | Performed by: NURSE PRACTITIONER

## 2019-07-29 RX ORDER — FERROUS SULFATE 325(65) MG
325 TABLET ORAL DAILY
COMMUNITY
End: 2020-02-26 | Stop reason: SDUPTHER

## 2019-07-29 RX ORDER — ASPIRIN 325 MG
TABLET, DELAYED RELEASE (ENTERIC COATED) ORAL
Refills: 3 | COMMUNITY
Start: 2019-06-18

## 2019-07-29 NOTE — TELEPHONE ENCOUNTER
----- Message from Margi Turcios NP sent at 7/29/2019 12:54 PM CDT -----  Please let pt know her Hgb has improved.

## 2019-07-29 NOTE — TELEPHONE ENCOUNTER
MA contacted Pt to give test results per Margi. Pt verbalized understanding and also asked how much did it improve. Ma gave pt the results and pt verbalized understanding.

## 2019-07-29 NOTE — PATIENT INSTRUCTIONS
For the iron, do not take 1 hour before or 2 hours after antacids, eggs, milk, milk byproducts, coffee or tea      Turn in your stool sample ASAP. One you do that, start taking the pantoprazole. Start taking the pantoprazole after you turn in you stool.

## 2019-07-29 NOTE — PROGRESS NOTES
[facility] Consultation Note    Reason for Consult:  The encounter diagnosis was Iron deficiency anemia, unspecified iron deficiency anemia type.    PCP:   Hilary Taveras   [address] [facility] [place] [address]    Referring MD:  CHRISTOPHER Jacome Md  [address] [facility]  [place] [address]    HPI:  This is a 69 y.o. female here for evaluation of symptomatic anemia. She is a new patient.    Recently went to the ER on 7/26. Hgb was found to be 5.3. She was Transfused with3 units of PRBC. Hgb 6.8 At discharge on 7/27.  Has a hx of GI bleeding about 3 years. Ago. Pt Reports 2 healing gastric ulcer with EGD, colonoscopy report said rectal ulcer.  Was DC'd on pantoprazole, has not yet started.   Was told to start taking oral iron twice a day, she has only been taking once a day because she forgets to take it BID.    She reports she has not noticed any overt signs of GI bleeding.  She denies abdominal pain, nausea, vomiting, unintentional weight loss.  She rarely has reflux.  No dysphagia or diarrhea.  She gets very occasional constipation. She does not use NSAIDs.  She states her fatigue has improved since she was discharged from the hospital.    ROS:  Constitutional: No fevers, chills, No weight loss  ENT: No allergies  CV: No chest pain  Pulm: No cough, + shortness of breath  Ophtho: No vision changes  GI: see HPI  Derm: No rash  MSK: + arthritis  : No dysuria, No hematuria  Neuro: No syncope, No seizure  Psych: No anxiety, No depression    Medical History:  has a past medical history of Asthma, Hyperlipidemia, Hypertension, IZABEL (obstructive sleep apnea), and Pre-diabetes.    Surgical History:  has a past surgical history that includes Partial hysterectomy; Breast lumpectomy (Left); and Breast biopsy (Left).    Family History: family history includes Breast cancer in her paternal aunt; Coronary artery disease in her brother and father; Hypertension in her mother..  "    Social History:  reports that she quit smoking about 20 years ago. She has never used smokeless tobacco. She reports that she drinks alcohol. She reports that she does not use drugs.    Review of patient's allergies indicates:  No Known Allergies    Current Outpatient Medications on File Prior to Visit   Medication Sig Dispense Refill    ferrous gluconate (FERGON) 324 MG tablet Take 1 tablet (324 mg total) by mouth 2 times daily 2 hours after meal. 60 tablet 2    ferrous sulfate (FEOSOL) 325 mg (65 mg iron) Tab tablet Take 325 mg by mouth once daily.      FLUoxetine 10 MG capsule TAKE 2 CAPSULES(20 MG) BY MOUTH EVERY  capsule 2    fluticasone (FLONASE) 50 mcg/actuation nasal spray 1 spray by Each Nare route once daily. 16 g 11    montelukast (SINGULAIR) 10 mg tablet TAKE 1 TABLET BY MOUTH EVERY EVENING 90 tablet 0    multivit-min-iron-FA-lutein (CENTRUM SILVER WOMEN) 8 mg iron-400 mcg-300 mcg Tab Take 1 tablet by mouth once daily.      albuterol (VENTOLIN HFA) 90 mcg/actuation inhaler Inhale 2 puffs into the lungs every 6 (six) hours as needed for Wheezing. Rescue 8 g 2    cholecalciferol, vitamin D3, 50,000 unit capsule TK 1 C PO WEEKLY  3    ergocalciferol (VITAMIN D2) 50,000 unit Cap Take 1 capsule (50,000 Units total) by mouth every 7 days. 30 capsule 2    olmesartan-hydrochlorothiazide (BENICAR HCT) 40-12.5 mg Tab TK 1 T PO D  3    pantoprazole (PROTONIX) 20 MG tablet Take 2 tablets (40 mg total) by mouth once daily. 30 tablet 0    polyethylene glycol (GLYCOLAX) 17 gram/dose powder Take 17 g by mouth once daily. 510 g 0    tiZANidine (ZANAFLEX) 4 MG tablet TAKE 1 TABLET(4 MG) BY MOUTH EVERY 12 HOURS AS NEEDED 20 tablet 0     No current facility-administered medications on file prior to visit.          Objective Findings:    Vital Signs:  BP (!) 147/84 (BP Location: Right arm)   Pulse 80   Ht 5' 4" (1.626 m)   Wt 125.4 kg (276 lb 7.3 oz)   LMP  (LMP Unknown)   BMI 47.45 kg/m²   Body " mass index is 47.45 kg/m².    Physical Exam:  General Appearance: Well appearing in no acute distress  Head:   Normocephalic, without obvious abnormality  Eyes:    No scleral icterus  ENT: Neck supple  Lungs: CTA bilaterally in anterior and posterior fields, no wheezes, no crackles.  Heart:  Regular rate and rhythm, S1, S2 normal, no murmurs heard  Abdomen: Soft, non tender, non distended with positive bowel sounds in all four quadrants. No hepatosplenomegaly, ascites, or mass  Extremities: No edema  Skin: No rash  Neurologic: AAO x 3      Labs:  Lab Results   Component Value Date    WBC 12.51 07/27/2019    HGB 6.8 (L) 07/27/2019    HCT 25.7 (L) 07/27/2019     07/27/2019    CHOL 200 05/07/2019    TRIG 54 05/07/2019    HDL 44 05/07/2019    ALT 10 07/26/2019    AST 15 07/26/2019     07/27/2019    K 3.8 07/27/2019     07/27/2019    CREATININE 0.7 07/27/2019    BUN 16 07/27/2019    CO2 24 07/27/2019    TSH 4.12 05/07/2019    HGBA1C 5.5 05/07/2019       Imaging reviewed:  7/26 Chest PA and lateral Xray with not acute processes seen      Assessment:    Ms. James is a 70 y/o BF with:    1. Iron deficiency anemia, unspecified iron deficiency anemia type       No overt signs of GI bleeding.7/25 Hgb 5.1.  Admitted to the hospital for symptomatic anemia.  She received 3 units of PRBCs.  Discharged on 7/27 Hgb 6.8.  Since discharge she said her fatigue has slightly improved.  Reports about 3 years ago she had iron deficiency anemia.  Was worked up at a hospital in [place].  We do not have those reports.   She reports her EGD and colonoscopy showed stool healed stomach ulcers and a rectal ulcer.    She also reports that she may have had H pylori in the past.  She has not started the pantoprazole that was recommended at recent discharge. I instructed patient to turn in stool to rule out H pylori within the next day or 2.  Then she can start taking the pantoprazole.  I also recommended patient to take her iron  twice a day.  I will refer her to hematology for possible IV infusions since she has severe iron deficiency.  Will also check her CBC today to make sure that her hemoglobin has not further dropped.  We also discussed EGD and colonoscopy today.  Those to be scheduled within the next week or 2 if possible.    Recommendations:  1. Labs and stool.  Once stool is turned in may start taking pantoprazole  2.  Ambulatory referral to Hematology for iron infusions.  3.  EGD and colonoscopy - Message has been sent to  for urgent appt.     Follow-up pending above    Order summary:  Orders Placed This Encounter    Celiac Disease Panel    H. pylori antigen, stool    CBC auto differential    Ambulatory Referral to Hematology / Oncology    Case request GI: EGD (ESOPHAGOGASTRODUODENOSCOPY), COLONOSCOPY         Thank you so much for allowing me to participate in the care of Amanda YoungSpringfieldflip Turcios, KAREN-C

## 2019-07-29 NOTE — LETTER
July 29, 2019      CHRISTOPHER Jacome MD  [address] [facility] [place] [address]           [facility]  [address]  [facility] [address]  Phone: [phone]  Fax: [phone]          Patient: Amanda James   MR Number: [id number]   YOB: 1950   Date of Visit: 7/29/2019       Dear Dr. CHRISTOPHER Jacome:    Thank you for referring Amanda James to me for evaluation. Attached you will find relevant portions of my assessment and plan of care.    If you have questions, please do not hesitate to call me. I look forward to following Amanda James along with you.    Sincerely,    Margi Turcios, ROSAURA    Enclosure  CC:  No Recipients    If you would like to receive this communication electronically, please contact [email or web address] or [phone] to request more information on [employer] access.    For providers and/or their staff who would like to refer a patient to [facility], please contact us through our one-stop-shop provider referral line, [facility] [place], at [phone].    If you feel you have received this communication in error or would no longer like to receive these types of communications, please e-mail [email or web address]

## 2019-07-30 ENCOUNTER — TELEPHONE (OUTPATIENT)
Dept: ENDOSCOPY | Facility: HOSPITAL | Age: 69
End: 2019-07-30

## 2019-07-30 ENCOUNTER — TELEPHONE (OUTPATIENT)
Dept: HEMATOLOGY/ONCOLOGY | Facility: CLINIC | Age: 69
End: 2019-07-30

## 2019-07-30 DIAGNOSIS — Z12.11 SPECIAL SCREENING FOR MALIGNANT NEOPLASMS, COLON: Primary | ICD-10-CM

## 2019-07-30 RX ORDER — POLYETHYLENE GLYCOL 3350, SODIUM SULFATE ANHYDROUS, SODIUM BICARBONATE, SODIUM CHLORIDE, POTASSIUM CHLORIDE 236; 22.74; 6.74; 5.86; 2.97 G/4L; G/4L; G/4L; G/4L; G/4L
4 POWDER, FOR SOLUTION ORAL ONCE
Qty: 4000 ML | Refills: 0 | Status: SHIPPED | OUTPATIENT
Start: 2019-07-30 | End: 2019-07-30

## 2019-08-01 ENCOUNTER — TELEPHONE (OUTPATIENT)
Dept: GASTROENTEROLOGY | Facility: CLINIC | Age: 69
End: 2019-08-01

## 2019-08-01 NOTE — TELEPHONE ENCOUNTER
MA contacted pt to give test results per Margi. Pt verbalized understanding.       ----- Message from Margi Turcios NP sent at 8/1/2019  1:19 PM CDT -----  Celiac panel normal

## 2019-08-05 ENCOUNTER — LAB VISIT (OUTPATIENT)
Dept: LAB | Facility: HOSPITAL | Age: 69
End: 2019-08-05
Attending: NURSE PRACTITIONER
Payer: MEDICARE

## 2019-08-05 ENCOUNTER — INITIAL CONSULT (OUTPATIENT)
Dept: HEMATOLOGY/ONCOLOGY | Facility: CLINIC | Age: 69
End: 2019-08-05
Payer: MEDICARE

## 2019-08-05 VITALS
WEIGHT: 275.81 LBS | OXYGEN SATURATION: 97 % | HEIGHT: 65 IN | DIASTOLIC BLOOD PRESSURE: 73 MMHG | TEMPERATURE: 99 F | SYSTOLIC BLOOD PRESSURE: 138 MMHG | HEART RATE: 82 BPM | BODY MASS INDEX: 45.95 KG/M2 | RESPIRATION RATE: 20 BRPM

## 2019-08-05 DIAGNOSIS — D52.9 ANEMIA DUE TO FOLIC ACID DEFICIENCY, UNSPECIFIED DEFICIENCY TYPE: ICD-10-CM

## 2019-08-05 DIAGNOSIS — D64.9 ANEMIA, UNSPECIFIED TYPE: Primary | ICD-10-CM

## 2019-08-05 DIAGNOSIS — D51.9 ANEMIA DUE TO VITAMIN B12 DEFICIENCY, UNSPECIFIED B12 DEFICIENCY TYPE: ICD-10-CM

## 2019-08-05 DIAGNOSIS — D50.9 IRON DEFICIENCY ANEMIA, UNSPECIFIED IRON DEFICIENCY ANEMIA TYPE: ICD-10-CM

## 2019-08-05 DIAGNOSIS — D64.9 ANEMIA, UNSPECIFIED TYPE: ICD-10-CM

## 2019-08-05 LAB
ALBUMIN SERPL BCP-MCNC: 3.1 G/DL (ref 3.5–5.2)
ALP SERPL-CCNC: 88 U/L (ref 55–135)
ALT SERPL W/O P-5'-P-CCNC: 14 U/L (ref 10–44)
ANION GAP SERPL CALC-SCNC: 8 MMOL/L (ref 8–16)
AST SERPL-CCNC: 15 U/L (ref 10–40)
BASOPHILS # BLD AUTO: 0.05 K/UL (ref 0–0.2)
BASOPHILS NFR BLD: 0.9 % (ref 0–1.9)
BILIRUB SERPL-MCNC: 0.5 MG/DL (ref 0.1–1)
BUN SERPL-MCNC: 18 MG/DL (ref 8–23)
CALCIUM SERPL-MCNC: 10.3 MG/DL (ref 8.7–10.5)
CHLORIDE SERPL-SCNC: 108 MMOL/L (ref 95–110)
CO2 SERPL-SCNC: 27 MMOL/L (ref 23–29)
CREAT SERPL-MCNC: 0.7 MG/DL (ref 0.5–1.4)
DIFFERENTIAL METHOD: ABNORMAL
EOSINOPHIL # BLD AUTO: 0.2 K/UL (ref 0–0.5)
EOSINOPHIL NFR BLD: 3.4 % (ref 0–8)
ERYTHROCYTE [DISTWIDTH] IN BLOOD BY AUTOMATED COUNT: 33.6 % (ref 11.5–14.5)
EST. GFR  (AFRICAN AMERICAN): >60 ML/MIN/1.73 M^2
EST. GFR  (NON AFRICAN AMERICAN): >60 ML/MIN/1.73 M^2
FERRITIN SERPL-MCNC: 38 NG/ML (ref 20–300)
FOLATE SERPL-MCNC: 11.6 NG/ML (ref 4–24)
GLUCOSE SERPL-MCNC: 103 MG/DL (ref 70–110)
HAPTOGLOB SERPL-MCNC: 156 MG/DL (ref 30–250)
HCT VFR BLD AUTO: 32.8 % (ref 37–48.5)
HGB BLD-MCNC: 9.6 G/DL (ref 12–16)
IMM GRANULOCYTES # BLD AUTO: 0.02 K/UL (ref 0–0.04)
IMM GRANULOCYTES NFR BLD AUTO: 0.4 % (ref 0–0.5)
IRON SERPL-MCNC: 31 UG/DL (ref 30–160)
LDH SERPL L TO P-CCNC: 177 U/L (ref 110–260)
LYMPHOCYTES # BLD AUTO: 1.2 K/UL (ref 1–4.8)
LYMPHOCYTES NFR BLD: 21.5 % (ref 18–48)
MCH RBC QN AUTO: 23.4 PG (ref 27–31)
MCHC RBC AUTO-ENTMCNC: 29.3 G/DL (ref 32–36)
MCV RBC AUTO: 80 FL (ref 82–98)
MONOCYTES # BLD AUTO: 0.4 K/UL (ref 0.3–1)
MONOCYTES NFR BLD: 7.2 % (ref 4–15)
NEUTROPHILS # BLD AUTO: 3.7 K/UL (ref 1.8–7.7)
NEUTROPHILS NFR BLD: 66.6 % (ref 38–73)
NRBC BLD-RTO: 0 /100 WBC
PLATELET # BLD AUTO: 294 K/UL (ref 150–350)
PMV BLD AUTO: 10.8 FL (ref 9.2–12.9)
POTASSIUM SERPL-SCNC: 4.2 MMOL/L (ref 3.5–5.1)
PROT SERPL-MCNC: 6.7 G/DL (ref 6–8.4)
RBC # BLD AUTO: 4.1 M/UL (ref 4–5.4)
RETICS/RBC NFR AUTO: 0.8 % (ref 0.5–2.5)
SATURATED IRON: 7 % (ref 20–50)
SODIUM SERPL-SCNC: 143 MMOL/L (ref 136–145)
TOTAL IRON BINDING CAPACITY: 422 UG/DL (ref 250–450)
TRANSFERRIN SERPL-MCNC: 285 MG/DL (ref 200–375)
VIT B12 SERPL-MCNC: 334 PG/ML (ref 210–950)
WBC # BLD AUTO: 5.53 K/UL (ref 3.9–12.7)

## 2019-08-05 PROCEDURE — 85045 AUTOMATED RETICULOCYTE COUNT: CPT

## 2019-08-05 PROCEDURE — 83615 LACTATE (LD) (LDH) ENZYME: CPT

## 2019-08-05 PROCEDURE — 84165 PROTEIN E-PHORESIS SERUM: CPT | Mod: 26,,, | Performed by: PATHOLOGY

## 2019-08-05 PROCEDURE — 1101F PR PT FALLS ASSESS DOC 0-1 FALLS W/OUT INJ PAST YR: ICD-10-PCS | Mod: CPTII,S$GLB,, | Performed by: NURSE PRACTITIONER

## 2019-08-05 PROCEDURE — 99214 PR OFFICE/OUTPT VISIT, EST, LEVL IV, 30-39 MIN: ICD-10-PCS | Mod: S$GLB,,, | Performed by: NURSE PRACTITIONER

## 2019-08-05 PROCEDURE — 36415 COLL VENOUS BLD VENIPUNCTURE: CPT

## 2019-08-05 PROCEDURE — 3078F PR MOST RECENT DIASTOLIC BLOOD PRESSURE < 80 MM HG: ICD-10-PCS | Mod: CPTII,S$GLB,, | Performed by: NURSE PRACTITIONER

## 2019-08-05 PROCEDURE — 84238 ASSAY NONENDOCRINE RECEPTOR: CPT

## 2019-08-05 PROCEDURE — 84165 PROTEIN E-PHORESIS SERUM: CPT

## 2019-08-05 PROCEDURE — 3075F SYST BP GE 130 - 139MM HG: CPT | Mod: CPTII,S$GLB,, | Performed by: NURSE PRACTITIONER

## 2019-08-05 PROCEDURE — 3075F PR MOST RECENT SYSTOLIC BLOOD PRESS GE 130-139MM HG: ICD-10-PCS | Mod: CPTII,S$GLB,, | Performed by: NURSE PRACTITIONER

## 2019-08-05 PROCEDURE — 84165 PATHOLOGIST INTERPRETATION SPE: ICD-10-PCS | Mod: 26,,, | Performed by: PATHOLOGY

## 2019-08-05 PROCEDURE — 83010 ASSAY OF HAPTOGLOBIN QUANT: CPT

## 2019-08-05 PROCEDURE — 82746 ASSAY OF FOLIC ACID SERUM: CPT

## 2019-08-05 PROCEDURE — 82607 VITAMIN B-12: CPT

## 2019-08-05 PROCEDURE — 99999 PR PBB SHADOW E&M-EST. PATIENT-LVL III: ICD-10-PCS | Mod: PBBFAC,,, | Performed by: NURSE PRACTITIONER

## 2019-08-05 PROCEDURE — 82728 ASSAY OF FERRITIN: CPT

## 2019-08-05 PROCEDURE — 99999 PR PBB SHADOW E&M-EST. PATIENT-LVL III: CPT | Mod: PBBFAC,,, | Performed by: NURSE PRACTITIONER

## 2019-08-05 PROCEDURE — 80053 COMPREHEN METABOLIC PANEL: CPT

## 2019-08-05 PROCEDURE — 99214 OFFICE O/P EST MOD 30 MIN: CPT | Mod: S$GLB,,, | Performed by: NURSE PRACTITIONER

## 2019-08-05 PROCEDURE — 86334 IMMUNOFIX E-PHORESIS SERUM: CPT | Mod: 26,,, | Performed by: PATHOLOGY

## 2019-08-05 PROCEDURE — 1101F PT FALLS ASSESS-DOCD LE1/YR: CPT | Mod: CPTII,S$GLB,, | Performed by: NURSE PRACTITIONER

## 2019-08-05 PROCEDURE — 83540 ASSAY OF IRON: CPT

## 2019-08-05 PROCEDURE — 85025 COMPLETE CBC W/AUTO DIFF WBC: CPT

## 2019-08-05 PROCEDURE — 86334 IMMUNOFIX E-PHORESIS SERUM: CPT

## 2019-08-05 PROCEDURE — 3078F DIAST BP <80 MM HG: CPT | Mod: CPTII,S$GLB,, | Performed by: NURSE PRACTITIONER

## 2019-08-05 PROCEDURE — 86334 PATHOLOGIST INTERPRETATION IFE: ICD-10-PCS | Mod: 26,,, | Performed by: PATHOLOGY

## 2019-08-05 NOTE — Clinical Note
Please schedule for Day 1 & 8 IV iron infusions once approved by insurance. CBC, iron/tibc, ferritin and NP appointment one week after completion of infusions

## 2019-08-05 NOTE — LETTER
August 5, 2019      Margi Turcios, ROSAURA  [address]  [facility] [address]           [facility]  [address]  [facility] [address]  Phone: [phone]          Patient: Amanda James   MR Number: [id number]   YOB: 1950   Date of Visit: 8/5/2019       Dear Margi Turcios:    Thank you for referring Amanda James to me for evaluation. Attached you will find relevant portions of my assessment and plan of care.    If you have questions, please do not hesitate to call me. I look forward to following Amanda James along with you.    Sincerely,    Jenny Navarro NP    Enclosure  CC:  No Recipients    If you would like to receive this communication electronically, please contact [email or web address] or [phone] to request more information on [employer] Link access.    For providers and/or their staff who would like to refer a patient to [facility], please contact us through our one-stop-shop provider referral line, [facility], at [phone].    If you feel you have received this communication in error or would no longer like to receive these types of communications, please e-mail [email or web address]

## 2019-08-05 NOTE — PROGRESS NOTES
HEMATOLOGY/ONCOLOGY NEW PATIENT CONSULT NOTE:    PATIENT: Amanda James  MRN: [id number]  DATE: 8/5/2019  Diagnosis:   1. Anemia, unspecified type    2. Iron deficiency anemia, unspecified iron deficiency anemia type    3. Anemia due to folic acid deficiency, unspecified deficiency type     4. Anemia due to vitamin B12 deficiency, unspecified B12 deficiency type       Chief Complaint: Anemia    Subjective:    Initial History: Ms. James is a 69 y.o. female who presents as a new patient consult for Iron deficiency anemia. Reports duration of anemia as long as 3 years. As of 7/25/19, her ferritin level was 2. She presented to the ED on 7/26/19 with symptomatic anemia. Was found to have Hgb in the 5's. She received 3 units of PRBCs. She denied any signs of symptoms of bleeding prior to hospital admission including nosebleeds, hematuria, melena or bright red stools, or hematemesis. She was discharged on 7/27 with a Hgb of 6.8. Patient has a history of GI bleeding, was treated approximately 3 years ago in [place] (EGD, incomplete C-scope). Had appointment with GI on 7/29/19. H. Pylori testing in process. Was started on protonix. EGD and colonoscopy scheduled for 8/14/19. She currently denies any recent evidence bleeding following hospital discharge  She was started on PO iron BID after discharge from the hospital. She also takes a daily multivitamin    Currently complains of fatigue, although it is much improved after blood transfusions. Shortness of breath with exertion has improved since receiving blood transfusions. Denies n/v/d/c. Is compliant at this time with iron supplements.    Past Medical History: Patient has a past medical history of Asthma, Hypertension, and IZABEL (obstructive sleep apnea).     Past Surgical History: Patient has a past surgical history that includes Partial hysterectomy; Breast lumpectomy (Left); and Breast biopsy (Left).     Social History: Patient reports that she quit smoking about 20 years  ago. She has never used smokeless tobacco. She denies any current alcohol use. She reports that she does not use drugs.     Family History: Includes Breast cancer in her paternal aunt; Coronary artery disease in her brother and father; Hypertension in her mother. No significant bleeding disorders in the family    Diet: Patient is not a vegetarian or vegan  GYN: Partial hysterectomy, post menopausal    Past Medical History:   Past Medical History:   Diagnosis Date    Asthma     Hyperlipidemia     Hypertension     IZABEL (obstructive sleep apnea)     Pre-diabetes      Past Surgical HIstory:   Past Surgical History:   Procedure Laterality Date    BREAST BIOPSY Left     benign    BREAST LUMPECTOMY Left     PARTIAL HYSTERECTOMY      uterus removed     Family History:   Family History   Problem Relation Age of Onset    Hypertension Mother     Coronary artery disease Father     Coronary artery disease Brother     Breast cancer Paternal Aunt     Colon cancer Neg Hx     Inflammatory bowel disease Neg Hx     Crohn's disease Neg Hx     Ulcerative colitis Neg Hx      Social History:  reports that she quit smoking about 20 years ago. She has never used smokeless tobacco. She reports that she drinks alcohol. She reports that she does not use drugs.  Allergies:  Review of patient's allergies indicates:  No Known Allergies  Medications:  Current Outpatient Medications   Medication Sig Dispense Refill    cholecalciferol, vitamin D3, 50,000 unit capsule TK 1 C PO WEEKLY  3    ferrous gluconate (FERGON) 324 MG tablet Take 1 tablet (324 mg total) by mouth 2 times daily 2 hours after meal. 60 tablet 2    fluticasone (FLONASE) 50 mcg/actuation nasal spray 1 spray by Each Nare route once daily. 16 g 11    montelukast (SINGULAIR) 10 mg tablet TAKE 1 TABLET BY MOUTH EVERY EVENING 90 tablet 0    multivit-min-iron-FA-lutein (CENTRUM SILVER WOMEN) 8 mg iron-400 mcg-300 mcg Tab Take 1 tablet by mouth once daily.       "pantoprazole (PROTONIX) 20 MG tablet Take 2 tablets (40 mg total) by mouth once daily. 30 tablet 0    albuterol (VENTOLIN HFA) 90 mcg/actuation inhaler Inhale 2 puffs into the lungs every 6 (six) hours as needed for Wheezing. Rescue 8 g 2    ergocalciferol (VITAMIN D2) 50,000 unit Cap Take 1 capsule (50,000 Units total) by mouth every 7 days. 30 capsule 2    ferrous sulfate (FEOSOL) 325 mg (65 mg iron) Tab tablet Take 325 mg by mouth once daily.      FLUoxetine 10 MG capsule TAKE 2 CAPSULES(20 MG) BY MOUTH EVERY  capsule 2    olmesartan-hydrochlorothiazide (BENICAR HCT) 40-12.5 mg Tab TK 1 T PO D  3    polyethylene glycol (GLYCOLAX) 17 gram/dose powder Take 17 g by mouth once daily. 510 g 0    tiZANidine (ZANAFLEX) 4 MG tablet TAKE 1 TABLET(4 MG) BY MOUTH EVERY 12 HOURS AS NEEDED 20 tablet 0     No current facility-administered medications for this visit.      Review of Systems  Constitutional: Negative for chills and fever. Positive for fatigue   HENT: Negative for congestion and nosebleeds.    Eyes: Negative for redness.   Respiratory: Negative for cough, sputum production, shortness of breath and wheezing.    Cardiovascular:  Negative for chest pain, leg swelling, and palpitations.   Gastrointestinal: Negative for abdominal pain, blood in stool, constipation, nausea and vomiting.   Genitourinary: Negative for dysuria, frequency and hematuria.   Musculoskeletal: Negative for back pain, falls, joint pain and myalgias.   Skin: Neg for rash or wounds  Neurological: Negative for dizziness, tingling and headaches.   Psychiatric/Behavioral: The patient is not nervous/anxious.   Past medical, surgical, social history reviewed with patient.      Objective:      Vitals:   Vitals:    08/05/19 1448   BP: 138/73   Pulse: 82   Resp: 20   Temp: 98.8 °F (37.1 °C)   TempSrc: Oral   SpO2: 97%   Weight: 125.1 kg (275 lb 12.7 oz)   Height: 5' 5" (1.651 m)     BMI: Body mass index is 45.89 kg/m².  Physical Exam  General " - well developed, well nourished, no apparent distress  HEENT - oropharynx clear  Chest and Lung - clear to auscultation   Cardiovascular - RRR with no MGR, normal S1 and S2. No swelling to legs noted  Abdomen-  soft, no palpable hepatomegaly or splenomegaly; no abdominal tenderness  Lymph - no lymphadenopathy on exam today  Heme - no bruising, petechiae, pallor  Skin - no wounds or rashes noted  Psych - appropriate mood and affect  Laboratory Data:  No visits with results within 1 Week(s) from this visit.   Latest known visit with results is:   Lab Visit on 07/29/2019   Component Date Value Ref Range Status    Antigliadin Abs, IgA 07/29/2019 8  <20 UNITS Final    Interpretation: Negative    Antigliadin Ab IgG 07/29/2019 3  <20 UNITS Final    Comment: Interpretation: Negative  Test performed at [facility],  [address] , [place]  [address]     [phone]  Giuseppe Sands MD  - Medical Director      TTG IgA 07/29/2019 13  <20 UNITS Final    Interpretation: Negative    TTG IgG 07/29/2019 3  <20 UNITS Final    Interpretation: Negative    Immunoglobulin A (IgA) 07/29/2019 207  70 - 400 mg/dL Final    Comment: Test performed at [facility],  [address] , [place], [place]  [id number] [phone]  Giuseppe Sands MD  - Medical Director      WBC 07/29/2019 7.70  3.90 - 12.70 K/uL Final    RBC 07/29/2019 4.10  4.00 - 5.40 M/uL Final    Hemoglobin 07/29/2019 8.6* 12.0 - 16.0 g/dL Final    Hematocrit 07/29/2019 30.1* 37.0 - 48.5 % Final    Mean Corpuscular Volume 07/29/2019 74* 82 - 98 fL Final    Mean Corpuscular Hemoglobin 07/29/2019 21.1* 27.0 - 31.0 pg Final    Mean Corpuscular Hemoglobin Conc 07/29/2019 28.7* 32.0 - 36.0 g/dL Final    RDW 07/29/2019 27.4* 11.5 - 14.5 % Final    Platelets 07/29/2019 306  150 - 350 K/uL Final    MPV 07/29/2019 9.2  9.2 - 12.9 fL Final    Lymph # 07/29/2019 CANCELED  1.0 - 4.8 K/uL Final    Result canceled by the ancillary.    Mono #  07/29/2019 CANCELED  0.3 - 1.0 K/uL Final    Result canceled by the ancillary.    Eos # 07/29/2019 CANCELED  0.0 - 0.5 K/uL Final    Result canceled by the ancillary.    Baso # 07/29/2019 CANCELED  0.00 - 0.20 K/uL Final    Result canceled by the ancillary.    Gran% 07/29/2019 69.0  38.0 - 73.0 % Final    Lymph% 07/29/2019 24.0  18.0 - 48.0 % Final    Mono% 07/29/2019 4.0  4.0 - 15.0 % Final    Eosinophil% 07/29/2019 1.0  0.0 - 8.0 % Final    Basophil% 07/29/2019 0.0  0.0 - 1.9 % Final    Bands 07/29/2019 2.0  % Final    Aniso 07/29/2019 Moderate   Final    Poly 07/29/2019 Occasional   Final    Hypo 07/29/2019 Moderate   Final    Target Cells 07/29/2019 Occasional   Final    Stomatocytes 07/29/2019 Present   Final    Differential Method 07/29/2019 Manual   Final       Assessment:       1. Anemia, unspecified type    2. Iron deficiency anemia, unspecified iron deficiency anemia type    3. Anemia due to folic acid deficiency, unspecified deficiency type     4. Anemia due to vitamin B12 deficiency, unspecified B12 deficiency type          Plan:     Anemia  Will check labs (CBC, CMP, iron/tibc, ferritin, soluble transferrin) today following visit; likely ferritin will be normal or elevated as patient received 3 units of blood during hospital admission ~2weeks ago  Will also check folate, B12, Spep, LDH, haptoglobin, and retic to complete anemia workup  Has follow up with GI scheduled for EGD/colonoscopy on 8/14/19  Continue with PO oral iron; discussed that patient increased to TID if able to tolerate      Follow up:   Labs (CBC, CMP, iron/tibc, ferritin, folate, vit B12, soluble transferrin, Spep, LDH, haptoglobin, and retic) today following visit  Follow up depending on lab results    Jenny Navarro NP  Hematology/Oncology/BMT      Collaborating physician Dr. Sands      Addendum: CBC improving following discharge. Hgb now 9.6. Ferritin remains low normal which suspected following blood transfusions.  Folate, B12, LDH, hapto, retic normal. Awaiting Spep, CASSANDRA, STR. Will schedule for IV iron infusions Day 1 & 8 as ferritin previously 2 before iron infusions. F/u two weeks after completion of iron infusion.

## 2019-08-06 LAB
ALBUMIN SERPL ELPH-MCNC: 3.57 G/DL (ref 3.35–5.55)
ALPHA1 GLOB SERPL ELPH-MCNC: 0.31 G/DL (ref 0.17–0.41)
ALPHA2 GLOB SERPL ELPH-MCNC: 0.79 G/DL (ref 0.43–0.99)
B-GLOBULIN SERPL ELPH-MCNC: 0.79 G/DL (ref 0.5–1.1)
GAMMA GLOB SERPL ELPH-MCNC: 0.95 G/DL (ref 0.67–1.58)
INTERPRETATION SERPL IFE-IMP: NORMAL
PATHOLOGIST INTERPRETATION IFE: NORMAL
PATHOLOGIST INTERPRETATION SPE: NORMAL
PROT SERPL-MCNC: 6.4 G/DL (ref 6–8.4)

## 2019-08-06 RX ORDER — HEPARIN 100 UNIT/ML
500 SYRINGE INTRAVENOUS
Status: CANCELLED | OUTPATIENT
Start: 2019-08-06

## 2019-08-06 RX ORDER — SODIUM CHLORIDE 0.9 % (FLUSH) 0.9 %
10 SYRINGE (ML) INJECTION
Status: CANCELLED | OUTPATIENT
Start: 2019-08-14

## 2019-08-06 RX ORDER — SODIUM CHLORIDE 0.9 % (FLUSH) 0.9 %
10 SYRINGE (ML) INJECTION
Status: CANCELLED | OUTPATIENT
Start: 2019-08-06

## 2019-08-06 RX ORDER — HEPARIN 100 UNIT/ML
500 SYRINGE INTRAVENOUS
Status: CANCELLED | OUTPATIENT
Start: 2019-08-14

## 2019-08-07 LAB — STFR SERPL-MCNC: 15 MG/L (ref 1.8–4.6)

## 2019-08-08 ENCOUNTER — TELEPHONE (OUTPATIENT)
Dept: GASTROENTEROLOGY | Facility: CLINIC | Age: 69
End: 2019-08-08

## 2019-08-08 NOTE — TELEPHONE ENCOUNTER
MA contacted pt back to give test results . Pt verbalized understanding and ask was that all the results, MA told pt that was all the results that were available at the time. pt verbalized understanding.         ----- Message from Peace Ozuna sent at 8/8/2019  1:11 PM CDT -----  Contact: self [phone]   Patient Returning Call from [facility]    Who Left Message for Patient: Karla   Communication Preference: self [phone]   Additional Information:

## 2019-08-08 NOTE — TELEPHONE ENCOUNTER
MA contacted pt to give test results per Margi. Pt did not answer, MA left a message to call the clinic back.      ----- Message from Margi Turcios NP sent at 8/8/2019  8:00 AM CDT -----  H. Pylori Negative

## 2019-08-13 ENCOUNTER — INFUSION (OUTPATIENT)
Dept: INFUSION THERAPY | Facility: HOSPITAL | Age: 69
End: 2019-08-13
Attending: NURSE PRACTITIONER
Payer: MEDICARE

## 2019-08-13 VITALS — SYSTOLIC BLOOD PRESSURE: 164 MMHG | DIASTOLIC BLOOD PRESSURE: 77 MMHG | RESPIRATION RATE: 18 BRPM | HEART RATE: 82 BPM

## 2019-08-13 DIAGNOSIS — D50.8 OTHER IRON DEFICIENCY ANEMIA: Primary | ICD-10-CM

## 2019-08-13 PROCEDURE — 96374 THER/PROPH/DIAG INJ IV PUSH: CPT

## 2019-08-13 PROCEDURE — 63600175 PHARM REV CODE 636 W HCPCS: Performed by: NURSE PRACTITIONER

## 2019-08-13 RX ORDER — HEPARIN 100 UNIT/ML
500 SYRINGE INTRAVENOUS
Status: DISCONTINUED | OUTPATIENT
Start: 2019-08-13 | End: 2019-08-13 | Stop reason: HOSPADM

## 2019-08-13 RX ORDER — SODIUM CHLORIDE 0.9 % (FLUSH) 0.9 %
10 SYRINGE (ML) INJECTION
Status: DISCONTINUED | OUTPATIENT
Start: 2019-08-13 | End: 2019-08-13 | Stop reason: HOSPADM

## 2019-08-13 RX ADMIN — FERRIC CARBOXYMALTOSE INJECTION 750 MG: 50 INJECTION, SOLUTION INTRAVENOUS at 03:08

## 2019-08-13 NOTE — PLAN OF CARE
Problem: Adult Inpatient Plan of Care  Goal: Plan of Care Review  Outcome: Ongoing (interventions implemented as appropriate)  Pt tolerated injectafer well.  Observed post infusion. No s/s of reaction. Vitals stable, NAD.

## 2019-08-14 ENCOUNTER — ANESTHESIA EVENT (OUTPATIENT)
Dept: ENDOSCOPY | Facility: HOSPITAL | Age: 69
End: 2019-08-14
Payer: MEDICARE

## 2019-08-14 ENCOUNTER — ANESTHESIA (OUTPATIENT)
Dept: ENDOSCOPY | Facility: HOSPITAL | Age: 69
End: 2019-08-14
Payer: MEDICARE

## 2019-08-14 ENCOUNTER — HOSPITAL ENCOUNTER (OUTPATIENT)
Facility: HOSPITAL | Age: 69
Discharge: HOME | End: 2019-08-14
Attending: INTERNAL MEDICINE | Admitting: INTERNAL MEDICINE
Payer: MEDICARE

## 2019-08-14 VITALS
SYSTOLIC BLOOD PRESSURE: 146 MMHG | WEIGHT: 271 LBS | RESPIRATION RATE: 23 BRPM | HEART RATE: 85 BPM | BODY MASS INDEX: 43.55 KG/M2 | HEIGHT: 66 IN | DIASTOLIC BLOOD PRESSURE: 78 MMHG | TEMPERATURE: 98 F | OXYGEN SATURATION: 100 %

## 2019-08-14 DIAGNOSIS — D50.8 OTHER IRON DEFICIENCY ANEMIA: Primary | ICD-10-CM

## 2019-08-14 DIAGNOSIS — D64.9 ANEMIA: ICD-10-CM

## 2019-08-14 PROCEDURE — D9220A PRA ANESTHESIA: Mod: CRNA,,, | Performed by: NURSE ANESTHETIST, CERTIFIED REGISTERED

## 2019-08-14 PROCEDURE — 25000003 PHARM REV CODE 250: Performed by: NURSE ANESTHETIST, CERTIFIED REGISTERED

## 2019-08-14 PROCEDURE — 43235 EGD DIAGNOSTIC BRUSH WASH: CPT | Performed by: INTERNAL MEDICINE

## 2019-08-14 PROCEDURE — 37000009 HC ANESTHESIA EA ADD 15 MINS: Performed by: INTERNAL MEDICINE

## 2019-08-14 PROCEDURE — 43235 EGD DIAGNOSTIC BRUSH WASH: CPT | Mod: 51,,, | Performed by: INTERNAL MEDICINE

## 2019-08-14 PROCEDURE — 27201012 HC FORCEPS, HOT/COLD, DISP: Performed by: INTERNAL MEDICINE

## 2019-08-14 PROCEDURE — 63600175 PHARM REV CODE 636 W HCPCS: Performed by: NURSE ANESTHETIST, CERTIFIED REGISTERED

## 2019-08-14 PROCEDURE — 43235 PR EGD, FLEX, DIAGNOSTIC: ICD-10-PCS | Mod: 51,,, | Performed by: INTERNAL MEDICINE

## 2019-08-14 PROCEDURE — 88305 TISSUE SPECIMEN TO PATHOLOGY - SURGERY: ICD-10-PCS | Mod: 26,,, | Performed by: PATHOLOGY

## 2019-08-14 PROCEDURE — D9220A PRA ANESTHESIA: ICD-10-PCS | Mod: CRNA,,, | Performed by: NURSE ANESTHETIST, CERTIFIED REGISTERED

## 2019-08-14 PROCEDURE — D9220A PRA ANESTHESIA: ICD-10-PCS | Mod: ANES,,, | Performed by: ANESTHESIOLOGY

## 2019-08-14 PROCEDURE — 63600175 PHARM REV CODE 636 W HCPCS: Performed by: INTERNAL MEDICINE

## 2019-08-14 PROCEDURE — 45380 PR COLONOSCOPY,BIOPSY: ICD-10-PCS | Mod: ,,, | Performed by: INTERNAL MEDICINE

## 2019-08-14 PROCEDURE — D9220A PRA ANESTHESIA: Mod: ANES,,, | Performed by: ANESTHESIOLOGY

## 2019-08-14 PROCEDURE — 45380 COLONOSCOPY AND BIOPSY: CPT | Performed by: INTERNAL MEDICINE

## 2019-08-14 PROCEDURE — 88305 TISSUE EXAM BY PATHOLOGIST: CPT | Mod: 26,,, | Performed by: PATHOLOGY

## 2019-08-14 PROCEDURE — 45380 COLONOSCOPY AND BIOPSY: CPT | Mod: ,,, | Performed by: INTERNAL MEDICINE

## 2019-08-14 PROCEDURE — 37000008 HC ANESTHESIA 1ST 15 MINUTES: Performed by: INTERNAL MEDICINE

## 2019-08-14 PROCEDURE — 88305 TISSUE EXAM BY PATHOLOGIST: CPT | Performed by: PATHOLOGY

## 2019-08-14 RX ORDER — PROPOFOL 10 MG/ML
VIAL (ML) INTRAVENOUS
Status: DISCONTINUED | OUTPATIENT
Start: 2019-08-14 | End: 2019-08-14

## 2019-08-14 RX ORDER — SODIUM CHLORIDE 9 MG/ML
INJECTION, SOLUTION INTRAVENOUS CONTINUOUS
Status: DISCONTINUED | OUTPATIENT
Start: 2019-08-14 | End: 2019-08-14 | Stop reason: HOSPADM

## 2019-08-14 RX ORDER — LIDOCAINE HCL/PF 100 MG/5ML
SYRINGE (ML) INTRAVENOUS
Status: DISCONTINUED | OUTPATIENT
Start: 2019-08-14 | End: 2019-08-14

## 2019-08-14 RX ORDER — SODIUM CHLORIDE 0.9 % (FLUSH) 0.9 %
10 SYRINGE (ML) INJECTION
Status: DISCONTINUED | OUTPATIENT
Start: 2019-08-14 | End: 2019-08-14 | Stop reason: HOSPADM

## 2019-08-14 RX ORDER — PROPOFOL 10 MG/ML
VIAL (ML) INTRAVENOUS CONTINUOUS PRN
Status: DISCONTINUED | OUTPATIENT
Start: 2019-08-14 | End: 2019-08-14

## 2019-08-14 RX ORDER — GLYCOPYRROLATE 0.2 MG/ML
INJECTION INTRAMUSCULAR; INTRAVENOUS
Status: DISCONTINUED | OUTPATIENT
Start: 2019-08-14 | End: 2019-08-14

## 2019-08-14 RX ADMIN — PROPOFOL 100 MG: 10 INJECTION, EMULSION INTRAVENOUS at 08:08

## 2019-08-14 RX ADMIN — SODIUM CHLORIDE: 0.9 INJECTION, SOLUTION INTRAVENOUS at 08:08

## 2019-08-14 RX ADMIN — GLYCOPYRROLATE 0.2 MG: 0.2 INJECTION, SOLUTION INTRAMUSCULAR; INTRAVENOUS at 08:08

## 2019-08-14 RX ADMIN — LIDOCAINE HYDROCHLORIDE 50 MG: 20 INJECTION, SOLUTION INTRAVENOUS at 08:08

## 2019-08-14 RX ADMIN — PROPOFOL 125 MCG/KG/MIN: 10 INJECTION, EMULSION INTRAVENOUS at 08:08

## 2019-08-14 NOTE — H&P
Short Stay Endoscopy History and Physical    PCP - Hilary Taveras DO  Referring Physician - Margi Turcios, ROSAURA  [address] [place] [address]    Procedure - eus  ASA - per anesthesia  Mallampati - per anesthesia  History of Anesthesia problems - no  Family history Anesthesia problems -  no   Plan of anesthesia - General    HPI:  This is a 69 y.o. female here for evaluation of: anemia    Reflux - no  Dysphagia - no  Abdominal pain - no  Diarrhea - no    ROS:  Constitutional: No fevers, chills, No weight loss  CV: No chest pain  Pulm: No cough, No shortness of breath  Ophtho: No vision changes  GI: see HPI  Derm: No rash    Medical History:  has a past medical history of Asthma, Hyperlipidemia, Hypertension, IZABEL (obstructive sleep apnea), and Pre-diabetes.    Surgical History:  has a past surgical history that includes Partial hysterectomy; Breast lumpectomy (Left); and Breast biopsy (Left).    Family History: family history includes Breast cancer in her paternal aunt; Coronary artery disease in her brother and father; Hypertension in her mother..    Social History:  reports that she quit smoking about 20 years ago. She has never used smokeless tobacco. She reports that she drinks alcohol. She reports that she does not use drugs.    Review of patient's allergies indicates:  No Known Allergies    Medications:   Medications Prior to Admission   Medication Sig Dispense Refill Last Dose    cholecalciferol, vitamin D3, 50,000 unit capsule TK 1 C PO WEEKLY  3 Past Week at Unknown time    ergocalciferol (VITAMIN D2) 50,000 unit Cap Take 1 capsule (50,000 Units total) by mouth every 7 days. 30 capsule 2 Past Week at Unknown time    ferrous gluconate (FERGON) 324 MG tablet Take 1 tablet (324 mg total) by mouth 2 times daily 2 hours after meal. 60 tablet 2 8/13/2019 at Unknown time    ferrous sulfate (FEOSOL) 325 mg (65 mg iron) Tab tablet Take 325 mg by mouth once daily.   8/13/2019 at Unknown time     FLUoxetine 10 MG capsule TAKE 2 CAPSULES(20 MG) BY MOUTH EVERY  capsule 2 8/13/2019 at Unknown time    fluticasone (FLONASE) 50 mcg/actuation nasal spray 1 spray by Each Nare route once daily. 16 g 11 8/13/2019 at Unknown time    montelukast (SINGULAIR) 10 mg tablet TAKE 1 TABLET BY MOUTH EVERY EVENING 90 tablet 0 8/13/2019 at Unknown time    olmesartan-hydrochlorothiazide (BENICAR HCT) 40-12.5 mg Tab TK 1 T PO D  3 8/13/2019 at Unknown time    pantoprazole (PROTONIX) 20 MG tablet Take 2 tablets (40 mg total) by mouth once daily. 30 tablet 0 8/13/2019 at Unknown time    albuterol (VENTOLIN HFA) 90 mcg/actuation inhaler Inhale 2 puffs into the lungs every 6 (six) hours as needed for Wheezing. Rescue 8 g 2 More than a month at Unknown time    multivit-min-iron-FA-lutein (CENTRUM SILVER WOMEN) 8 mg iron-400 mcg-300 mcg Tab Take 1 tablet by mouth once daily.   Taking    polyethylene glycol (GLYCOLAX) 17 gram/dose powder Take 17 g by mouth once daily. 510 g 0 Not Taking    tiZANidine (ZANAFLEX) 4 MG tablet TAKE 1 TABLET(4 MG) BY MOUTH EVERY 12 HOURS AS NEEDED 20 tablet 0 Not Taking       Physical Exam:    Vital Signs:   Vitals:    08/14/19 0741   BP: (!) 159/74   Pulse: 80   Resp: 16   Temp: 97.8 °F (36.6 °C)       General Appearance: Well appearing in no acute distress    Labs:  Lab Results   Component Value Date    WBC 5.53 08/05/2019    HGB 9.6 (L) 08/05/2019    HCT 32.8 (L) 08/05/2019     08/05/2019    CHOL 200 05/07/2019    TRIG 54 05/07/2019    HDL 44 05/07/2019    ALT 14 08/05/2019    AST 15 08/05/2019     08/05/2019    K 4.2 08/05/2019     08/05/2019    CREATININE 0.7 08/05/2019    BUN 18 08/05/2019    CO2 27 08/05/2019    TSH 4.12 05/07/2019    HGBA1C 5.5 05/07/2019       I have explained the risks and benefits of this endoscopic procedure to the patient including but not limited to bleeding, inflammation, infection, perforation, and death.      Clinton Gottlieb MD

## 2019-08-14 NOTE — ANESTHESIA POSTPROCEDURE EVALUATION
Anesthesia Post Evaluation    Patient: Amanda James    Procedure(s) Performed: Procedure(s) (LRB):  EGD (ESOPHAGOGASTRODUODENOSCOPY) (N/A)  COLONOSCOPY (N/A)    Final Anesthesia Type: general  Patient location during evaluation: PACU  Patient participation: Yes- Able to Participate  Level of consciousness: awake and alert  Post-procedure vital signs: reviewed and stable  Pain management: adequate  Airway patency: patent  PONV status at discharge: No PONV  Anesthetic complications: no      Cardiovascular status: blood pressure returned to baseline  Respiratory status: unassisted, spontaneous ventilation and room air  Hydration status: euvolemic  Follow-up not needed.          Vitals Value Taken Time   /78 8/14/2019  9:16 AM   Temp 36.6 °C (97.9 °F) 8/14/2019  9:12 AM   Pulse 81 8/14/2019  9:18 AM   Resp 22 8/14/2019  9:17 AM   SpO2 100 % 8/14/2019  9:18 AM   Vitals shown include unvalidated device data.      No case tracking events are documented in the log.      Pain/Mazin Score: Mazin Score: 10 (8/14/2019  9:00 AM)

## 2019-08-14 NOTE — ANESTHESIA PREPROCEDURE EVALUATION
08/14/2019  Amanda James is a 69 y.o., female.    Anesthesia Evaluation    I have reviewed the Patient Summary Reports.    I have reviewed the Nursing Notes.   I have reviewed the Medications.     Review of Systems  Anesthesia Hx:  No problems with previous Anesthesia  History of prior surgery of interest to airway management or planning: Denies Family Hx of Anesthesia complications.   Denies Personal Hx of Anesthesia complications.   Social:  Non-Smoker    Hematology/Oncology:     Oncology Normal    -- Anemia:   EENT/Dental:EENT/Dental Normal   Cardiovascular:   Hypertension    Pulmonary:   Asthma Sleep Apnea    Renal/:  Renal/ Normal     Hepatic/GI:  Hepatic/GI Normal    Musculoskeletal:  Musculoskeletal Normal    Neurological:  Neurology Normal    Endocrine:  Endocrine Normal Morbid obesity   Psych:  Psychiatric Normal           Physical Exam  General:  Well nourished, Morbid Obesity    Airway/Jaw/Neck:  Airway Findings: Mouth Opening: Normal Tongue: Large  General Airway Assessment: Adult  Mallampati: III  Improves to II with phonation.  TM Distance: Normal, at least 6 cm  Jaw/Neck Findings:  Neck ROM: Normal ROM      Dental:  Dental Findings: In tact   Chest/Lungs:  Chest/Lungs Findings: Clear to auscultation, Normal Respiratory Rate     Heart/Vascular:  Heart Findings: Rate: Normal  Rhythm: Regular Rhythm  Sounds: Normal        Mental Status:  Mental Status Findings:  Cooperative, Alert and Oriented         Anesthesia Plan  Type of Anesthesia, risks & benefits discussed:  Anesthesia Type:  general  Patient's Preference:   Intra-op Monitoring Plan: standard ASA monitors  Intra-op Monitoring Plan Comments:   Post Op Pain Control Plan: multimodal analgesia  Post Op Pain Control Plan Comments:   Induction:   IV  Beta Blocker:  Patient is not currently on a Beta-Blocker (No further documentation  required).       Informed Consent: Patient understands risks and agrees with Anesthesia plan.  Questions answered. Anesthesia consent signed with patient.  ASA Score: 3     Day of Surgery Review of History & Physical:    H&P update referred to the surgeon.         Ready For Surgery From Anesthesia Perspective.

## 2019-08-14 NOTE — [employer] PATIENT INSTRUCTIONS
Discharge Summary/Instructions after an Endoscopic Procedure  Patient Name: Amanda James  Patient MRN: [id number]  Patient YOB: 1950 Wednesday, August 14, 2019  Clinton Gottlieb MD  RESTRICTIONS:  During your procedure today, you received medications for sedation.  These   medications may affect your judgment, balance and coordination.  Therefore,   for 24 hours, you have the following restrictions:   - DO NOT drive a car, operate machinery, make legal/financial decisions,   sign important papers or drink alcohol.    ACTIVITY:  Today: no heavy lifting, straining or running due to procedural   sedation/anesthesia.  The following day: return to full activity including work.  DIET:  Eat and drink normally unless instructed otherwise.     TREATMENT FOR COMMON SIDE EFFECTS:  - Mild abdominal pain, nausea, belching, bloating or excessive gas:  rest,   eat lightly and use a heating pad.  - Sore Throat: treat with throat lozenges and/or gargle with warm salt   water.  - Because air was used during the procedure, expelling large amounts of air   from your rectum or belching is normal.  - If a bowel prep was taken, you may not have a bowel movement for 1-3 days.    This is normal.  SYMPTOMS TO WATCH FOR AND REPORT TO YOUR PHYSICIAN:  1. Abdominal pain or bloating, other than gas cramps.  2. Chest pain.  3. Back pain.  4. Signs of infection such as: chills or fever occurring within 24 hours   after the procedure.  5. Rectal bleeding, which would show as bright red, maroon, or black stools.   (A tablespoon of blood from the rectum is not serious, especially if   hemorrhoids are present.)  6. Vomiting.  7. Weakness or dizziness.  GO DIRECTLY TO THE NEAREST EMERGENCY ROOM IF YOU HAVE ANY OF THE FOLLOWING:      Difficulty breathing              Chills and/or fever over 101 F   Persistent vomiting and/or vomiting blood   Severe abdominal pain   Severe chest pain   Black, tarry stools   Bleeding- more than one  tablespoon   Any other symptom or condition that you feel may need urgent attention  Your doctor recommends these additional instructions:  If any biopsies were taken, your doctors clinic will contact you in 1 to 2   weeks with any results.  - Discharge patient to home.   - Resume previous diet.   - Continue present medications.   - Return to primary care physician at appointment to be scheduled.  For questions, problems or results please call your physician - Clinton Gottlieb MD at Work:  [phone].  [facility], EMERGENCY ROOM PHONE NUMBER: [phone]  IF A COMPLICATION OR EMERGENCY SITUATION ARISES AND YOU ARE UNABLE TO REACH   YOUR PHYSICIAN - GO DIRECTLY TO THE EMERGENCY ROOM.  Clinton Gottlieb MD  8/14/2019 8:38:14 AM  This report has been verified and signed electronically.  [employer]

## 2019-08-14 NOTE — TRANSFER OF CARE
"Anesthesia Transfer of Care Note    Patient: Amanda James    Procedure(s) Performed: Procedure(s) (LRB):  EGD (ESOPHAGOGASTRODUODENOSCOPY) (N/A)  COLONOSCOPY (N/A)    Patient location: [facility]    Anesthesia Type: general    Transport from OR: Transported from OR on 2-3 L/min O2 by NC with adequate spontaneous ventilation    Post pain: adequate analgesia    Post assessment: no apparent anesthetic complications and tolerated procedure well    Post vital signs: stable    Level of consciousness: awake    Nausea/Vomiting: no nausea/vomiting    Complications: none    Transfer of care protocol was followed      Last vitals:   Visit Vitals  /65   Pulse 88   Temp 36.6 °C (97.9 °F) (Temporal)   Resp (!) 21   Ht 5' 6" (1.676 m)   Wt 122.9 kg (271 lb)   LMP  (LMP Unknown)   SpO2 99%   Breastfeeding? No   BMI 43.74 kg/m²     "

## 2019-08-14 NOTE — PLAN OF CARE
Discharge instructions given and explained to patient and family with verbalization of understanding all instructions. Patients v/s stable, denies n/v and tolerating po, rates pain level tolerable, IV removed, and daughter at bedside for patient discharge home.

## 2019-08-14 NOTE — PROVATION PATIENT INSTRUCTIONS
Discharge Summary/Instructions after an Endoscopic Procedure  Patient Name: Amanda James  Patient MRN: [id number]  Patient YOB: 1950 Wednesday, August 14, 2019  Clinton Gottlieb MD  RESTRICTIONS:  During your procedure today, you received medications for sedation.  These   medications may affect your judgment, balance and coordination.  Therefore,   for 24 hours, you have the following restrictions:   - DO NOT drive a car, operate machinery, make legal/financial decisions,   sign important papers or drink alcohol.    ACTIVITY:  Today: no heavy lifting, straining or running due to procedural   sedation/anesthesia.  The following day: return to full activity including work.  DIET:  Eat and drink normally unless instructed otherwise.     TREATMENT FOR COMMON SIDE EFFECTS:  - Mild abdominal pain, nausea, belching, bloating or excessive gas:  rest,   eat lightly and use a heating pad.  - Sore Throat: treat with throat lozenges and/or gargle with warm salt   water.  - Because air was used during the procedure, expelling large amounts of air   from your rectum or belching is normal.  - If a bowel prep was taken, you may not have a bowel movement for 1-3 days.    This is normal.  SYMPTOMS TO WATCH FOR AND REPORT TO YOUR PHYSICIAN:  1. Abdominal pain or bloating, other than gas cramps.  2. Chest pain.  3. Back pain.  4. Signs of infection such as: chills or fever occurring within 24 hours   after the procedure.  5. Rectal bleeding, which would show as bright red, maroon, or black stools.   (A tablespoon of blood from the rectum is not serious, especially if   hemorrhoids are present.)  6. Vomiting.  7. Weakness or dizziness.  GO DIRECTLY TO THE NEAREST EMERGENCY ROOM IF YOU HAVE ANY OF THE FOLLOWING:      Difficulty breathing              Chills and/or fever over 101 F   Persistent vomiting and/or vomiting blood   Severe abdominal pain   Severe chest pain   Black, tarry stools   Bleeding- more than one  tablespoon   Any other symptom or condition that you feel may need urgent attention  Your doctor recommends these additional instructions:  If any biopsies were taken, your doctors clinic will contact you in 1 to 2   weeks with any results.  - Await pathology results.   - If the pathology report reveals adenomatous tissue, then repeat the   colonoscopy for surveillance in 7 years.   - Discharge patient to home.   - Continue present medications.  For questions, problems or results please call your physician - Clinton Gottlieb MD at Work:  [phone].  [facility], EMERGENCY ROOM PHONE NUMBER: [phone]  IF A COMPLICATION OR EMERGENCY SITUATION ARISES AND YOU ARE UNABLE TO REACH   YOUR PHYSICIAN - GO DIRECTLY TO THE EMERGENCY ROOM.  Clinton Gottlieb MD  8/14/2019 8:41:24 AM  This report has been verified and signed electronically.  [employer]

## 2019-08-14 NOTE — DISCHARGE INSTRUCTIONS
Upper GI Endoscopy     During endoscopy, a long, flexible tube is used to view the inside of your upper GI tract.      Upper GI endoscopy allows your healthcare provider to look directly into the beginning of your gastrointestinal (GI) tract. The esophagus, stomach, and duodenum (the first part of the small intestine) make up the upper GI tract.   Before the exam  Follow these and any other instructions you are given before your endoscopy. If you dont follow the healthcare providers instructions carefully, the test may need to be canceled or done over:  · Don't eat or drink anything after midnight the night before your exam. If your exam is in the afternoon, drink only clear liquids in the morning. Don't eat or drink anything for 8 hours before the exam. In some cases, you may be able to take medicines with sips of water until 2 hours before the procedure. Speak with your healthcare provider about this.   · Bring your X-rays and any other test results you have.  · Because you will be sedated, arrange for an adult to drive you home after the exam.  · Tell your healthcare provider before the exam if you are taking any medicines or have any medical problems.  The procedure  Here is what to expect:  · You will lie on the endoscopy table. Usually patients lie on the left side.  · You will be monitored and given oxygen.  · Your throat may be numbed with a spray or gargle. You are given medicine through an intravenous (IV) line that will help you relax and remain comfortable. You may be awake or asleep during the procedure.  · The healthcare provider will put the endoscope in your mouth and down your esophagus. It is thinner than most pieces of food that you swallow. It will not affect your breathing. The medicine helps keep you from gagging.  · Air is put into your GI tract to expand it. It can make you burp.  · During the procedure, the healthcare provider can take biopsies (tissue samples), remove abnormalities,  such as polyps, or treat abnormalities through a variety of devices placed through the endoscope. You will not feel this.   · The endoscope carries images of your upper GI tract to a video screen. If you are awake, you may be able to look at the images.  · After the procedure is done, you will rest for a time. An adult must drive you home.  When to call your healthcare provider  Contact your healthcare provider if you have:  · Black or tarry stools, or blood in your stool  · Fever  · Pain in your belly that does not go away  · Nausea and vomiting, or vomiting blood   Date Last Reviewed: 7/1/2016 © 2000-2017 [employer]. [address] [place] [address], [place] [address]. All rights reserved. This information is not intended as a substitute for professional medical care. Always follow your healthcare professional's instructions.        Colonoscopy     A camera attached to a flexible tube with a viewing lens is used to take video pictures.     Colonoscopy is a test to view the inside of your lower digestive tract (colon and rectum). Sometimes it can show the last part of the small intestine (ileum). During the test, small pieces of tissue may be removed for testing. This is called a biopsy. Small growths, such as polyps, may also be removed.   Why is colonoscopy done?  The test is done to help look for colon cancer. And it can help find the source of abdominal pain, bleeding, and changes in bowel habits. It may be needed once a year, depending on factors such as your:  · Age  · Health history  · Family health history  · Symptoms  · Results from any prior colonoscopy  Risks and possible complications  These include:  · Bleeding               · A puncture or tear in the colon   · Risks of anesthesia  · A cancer lesion not being seen  Getting ready   To prepare for the test:  · Talk with your healthcare provider about the risks of the test (see below). Also ask your healthcare provider about alternatives to the  test.  · Tell your healthcare provider about any medicines you take. Also tell him or her about any health conditions you may have.  · Make sure your rectum and colon are empty for the test. Follow the diet and bowel prep instructions exactly. If you dont, the test may need to be rescheduled.  · Plan for a friend or family member to drive you home after the test.     Colonoscopy provides an inside view of the entire colon.     You may discuss the results with your doctor right away or at a future visit.  During the test   The test is usually done in the hospital on an outpatient basis. This means you go home the same day. The procedure takes about 30 minutes. During that time:  · You are given relaxing (sedating) medicine through an IV line. You may be drowsy, or fully asleep.  · The healthcare provider will first give you a physical exam to check for anal and rectal problems.  · Then the anus is lubricated and the scope inserted.  · If you are awake, you may have a feeling similar to needing to have a bowel movement. You may also feel pressure as air is pumped into the colon. Its OK to pass gas during the procedure.  · Biopsy, polyp removal, or other treatments may be done during the test.  After the test   You may have gas right after the test. It can help to try to pass it to help prevent later bloating. Your healthcare provider may discuss the results with you right away. Or you may need to schedule a follow-up visit to talk about the results. After the test, you can go back to your normal eating and other activities. You may be tired from the sedation and need to rest for a few hours.  Date Last Reviewed: 11/1/2016 © 2000-2017 [employer]. [address] [place] [address]. All rights reserved. This information is not intended as a substitute for professional medical care. Always follow your healthcare professional's instructions.        Recovery After Procedural Sedation (Adult)  You  have been given medicine by vein to make you sleep during your surgery. This may have included both a pain medicine and sleeping medicine. Most of the effects have worn off. But you may still have some drowsiness for the next 6 to 8 hours.  Home care  Follow these guidelines when you get home:  · For the next 8 hours, you should be watched by a responsible adult. This person should make sure your condition is not getting worse.  · Don't drink any alcohol for the next 24 hours.  · Don't drive, operate dangerous machinery, or make important business or personal decisions during the next 24 hours.  Note: Your healthcare provider may tell you not to take any medicine by mouth for pain or sleep in the next 4 hours. These medicines may react with the medicines you were given in the hospital. This could cause a much stronger response than usual.  Follow-up care  Follow up with your healthcare provider if you are not alert and back to your usual level of activity within 12 hours.  When to seek medical advice  Call your healthcare provider right away if any of these occur:  · Drowsiness gets worse  · Weakness or dizziness gets worse  · Repeated vomiting  · You can't be awakened   Date Last Reviewed: 10/18/2016  © 9310-3364 [employer]. [address] [place] [address], [place] [address]. All rights reserved. This information is not intended as a substitute for professional medical care. Always follow your healthcare professional's instructions.

## 2019-08-20 ENCOUNTER — INFUSION (OUTPATIENT)
Dept: INFUSION THERAPY | Facility: HOSPITAL | Age: 69
End: 2019-08-20
Attending: NURSE PRACTITIONER
Payer: MEDICARE

## 2019-08-20 VITALS
TEMPERATURE: 98 F | DIASTOLIC BLOOD PRESSURE: 64 MMHG | RESPIRATION RATE: 20 BRPM | HEART RATE: 71 BPM | SYSTOLIC BLOOD PRESSURE: 125 MMHG

## 2019-08-20 DIAGNOSIS — D50.8 OTHER IRON DEFICIENCY ANEMIA: Primary | ICD-10-CM

## 2019-08-20 PROCEDURE — 96374 THER/PROPH/DIAG INJ IV PUSH: CPT

## 2019-08-20 PROCEDURE — 63600175 PHARM REV CODE 636 W HCPCS: Mod: JG | Performed by: NURSE PRACTITIONER

## 2019-08-20 RX ADMIN — SODIUM CHLORIDE: 9 INJECTION, SOLUTION INTRAVENOUS at 02:08

## 2019-08-20 RX ADMIN — FERRIC CARBOXYMALTOSE INJECTION 750 MG: 50 INJECTION, SOLUTION INTRAVENOUS at 02:08

## 2019-08-20 NOTE — PLAN OF CARE
Problem: Adult Inpatient Plan of Care  Goal: Patient-Specific Goal (Individualization)  Outcome: Ongoing (interventions implemented as appropriate)  Patient tolerated Injectafer with no complications. VSS. Pt instructed to call MD with any problems. NAD. Pt discharged home independently.

## 2019-08-27 ENCOUNTER — LAB VISIT (OUTPATIENT)
Dept: LAB | Facility: HOSPITAL | Age: 69
End: 2019-08-27
Payer: MEDICARE

## 2019-08-27 ENCOUNTER — OFFICE VISIT (OUTPATIENT)
Dept: HEMATOLOGY/ONCOLOGY | Facility: CLINIC | Age: 69
End: 2019-08-27
Payer: MEDICARE

## 2019-08-27 VITALS
BODY MASS INDEX: 45.55 KG/M2 | WEIGHT: 273.38 LBS | DIASTOLIC BLOOD PRESSURE: 73 MMHG | RESPIRATION RATE: 18 BRPM | TEMPERATURE: 98 F | OXYGEN SATURATION: 99 % | HEART RATE: 83 BPM | SYSTOLIC BLOOD PRESSURE: 187 MMHG | HEIGHT: 65 IN

## 2019-08-27 DIAGNOSIS — D50.8 OTHER IRON DEFICIENCY ANEMIA: Primary | ICD-10-CM

## 2019-08-27 DIAGNOSIS — D50.9 IRON DEFICIENCY ANEMIA, UNSPECIFIED IRON DEFICIENCY ANEMIA TYPE: ICD-10-CM

## 2019-08-27 LAB
BASOPHILS # BLD AUTO: 0.04 K/UL (ref 0–0.2)
BASOPHILS NFR BLD: 0.7 % (ref 0–1.9)
DIFFERENTIAL METHOD: ABNORMAL
EOSINOPHIL # BLD AUTO: 0.2 K/UL (ref 0–0.5)
EOSINOPHIL NFR BLD: 3.4 % (ref 0–8)
ERYTHROCYTE [DISTWIDTH] IN BLOOD BY AUTOMATED COUNT: ABNORMAL % (ref 11.5–14.5)
FERRITIN SERPL-MCNC: 730 NG/ML (ref 20–300)
HCT VFR BLD AUTO: 41.9 % (ref 37–48.5)
HGB BLD-MCNC: 12.1 G/DL (ref 12–16)
IMM GRANULOCYTES # BLD AUTO: 0.01 K/UL (ref 0–0.04)
IMM GRANULOCYTES NFR BLD AUTO: 0.2 % (ref 0–0.5)
IRON SERPL-MCNC: 87 UG/DL (ref 30–160)
LYMPHOCYTES # BLD AUTO: 1.7 K/UL (ref 1–4.8)
LYMPHOCYTES NFR BLD: 28.5 % (ref 18–48)
MCH RBC QN AUTO: 26 PG (ref 27–31)
MCHC RBC AUTO-ENTMCNC: 28.9 G/DL (ref 32–36)
MCV RBC AUTO: 90 FL (ref 82–98)
MONOCYTES # BLD AUTO: 0.5 K/UL (ref 0.3–1)
MONOCYTES NFR BLD: 8.2 % (ref 4–15)
NEUTROPHILS # BLD AUTO: 3.5 K/UL (ref 1.8–7.7)
NEUTROPHILS NFR BLD: 59 % (ref 38–73)
NRBC BLD-RTO: 0 /100 WBC
PLATELET # BLD AUTO: 224 K/UL (ref 150–350)
PMV BLD AUTO: ABNORMAL FL (ref 9.2–12.9)
RBC # BLD AUTO: 4.66 M/UL (ref 4–5.4)
SATURATED IRON: 29 % (ref 20–50)
TOTAL IRON BINDING CAPACITY: 299 UG/DL (ref 250–450)
TRANSFERRIN SERPL-MCNC: 202 MG/DL (ref 200–375)
WBC # BLD AUTO: 5.96 K/UL (ref 3.9–12.7)

## 2019-08-27 PROCEDURE — 3077F PR MOST RECENT SYSTOLIC BLOOD PRESSURE >= 140 MM HG: ICD-10-PCS | Mod: CPTII,S$GLB,, | Performed by: NURSE PRACTITIONER

## 2019-08-27 PROCEDURE — 1101F PR PT FALLS ASSESS DOC 0-1 FALLS W/OUT INJ PAST YR: ICD-10-PCS | Mod: CPTII,S$GLB,, | Performed by: NURSE PRACTITIONER

## 2019-08-27 PROCEDURE — 85025 COMPLETE CBC W/AUTO DIFF WBC: CPT

## 2019-08-27 PROCEDURE — 99999 PR PBB SHADOW E&M-EST. PATIENT-LVL III: ICD-10-PCS | Mod: PBBFAC,,, | Performed by: NURSE PRACTITIONER

## 2019-08-27 PROCEDURE — 3078F PR MOST RECENT DIASTOLIC BLOOD PRESSURE < 80 MM HG: ICD-10-PCS | Mod: CPTII,S$GLB,, | Performed by: NURSE PRACTITIONER

## 2019-08-27 PROCEDURE — 99999 PR PBB SHADOW E&M-EST. PATIENT-LVL III: CPT | Mod: PBBFAC,,, | Performed by: NURSE PRACTITIONER

## 2019-08-27 PROCEDURE — 36415 COLL VENOUS BLD VENIPUNCTURE: CPT

## 2019-08-27 PROCEDURE — 3077F SYST BP >= 140 MM HG: CPT | Mod: CPTII,S$GLB,, | Performed by: NURSE PRACTITIONER

## 2019-08-27 PROCEDURE — 83540 ASSAY OF IRON: CPT

## 2019-08-27 PROCEDURE — 99214 PR OFFICE/OUTPT VISIT, EST, LEVL IV, 30-39 MIN: ICD-10-PCS | Mod: S$GLB,,, | Performed by: NURSE PRACTITIONER

## 2019-08-27 PROCEDURE — 82728 ASSAY OF FERRITIN: CPT

## 2019-08-27 PROCEDURE — 1101F PT FALLS ASSESS-DOCD LE1/YR: CPT | Mod: CPTII,S$GLB,, | Performed by: NURSE PRACTITIONER

## 2019-08-27 PROCEDURE — 3078F DIAST BP <80 MM HG: CPT | Mod: CPTII,S$GLB,, | Performed by: NURSE PRACTITIONER

## 2019-08-27 PROCEDURE — 99214 OFFICE O/P EST MOD 30 MIN: CPT | Mod: S$GLB,,, | Performed by: NURSE PRACTITIONER

## 2019-08-27 NOTE — PROGRESS NOTES
HEMATOLOGY/ONCOLOGY NEW PATIENT CONSULT NOTE:    PATIENT: Amanda James  MRN: [id number]  DATE: 8/27/2019  Diagnosis:   1. Other iron deficiency anemia      Chief Complaint: No chief complaint on file.    Subjective:    Initial History: Ms. James is a 69 y.o. female who initially presented as a new patient consult for Iron deficiency anemia 8/2019. Reports duration of anemia as long as 3 years. As of 7/25/19, her ferritin level was 2. She presented to the ED on 7/26/19 with symptomatic anemia. Was found to have Hgb in the 5's. She received 3 units of PRBCs. She denied any signs of symptoms of bleeding prior to hospital admission including nosebleeds, hematuria, melena or bright red stools, or hematemesis. She was discharged on 7/27 with a Hgb of 6.8. Patient has a history of GI bleeding, was treated approximately 3 years ago in [place] (EGD, incomplete C-scope). Had appointment with GI on 7/29/19. H. Pylori testing in process. Was started on protonix. EGD and colonoscopy scheduled for 8/14/19. She currently denies any recent evidence bleeding following hospital discharge  She was started on PO iron BID after discharge from the hospital. She also takes a daily multivitamin    Had complaints of fatigue, although it is much improved after blood transfusions. Shortness of breath with exertion has improved since receiving blood transfusions. Denies n/v/d/c. Is compliant at this time with iron supplements.    Past Medical History: Patient has a past medical history of Asthma, Hypertension, and IZABEL (obstructive sleep apnea).     Past Surgical History: Patient has a past surgical history that includes Partial hysterectomy; Breast lumpectomy (Left); and Breast biopsy (Left).     Social History: Patient reports that she quit smoking about 20 years ago. She has never used smokeless tobacco. She denies any current alcohol use. She reports that she does not use drugs.     Family History: Includes Breast cancer in her paternal  aunt; Coronary artery disease in her brother and father; Hypertension in her mother. No significant bleeding disorders in the family    Diet: Patient is not a vegetarian or vegan  GYN: Partial hysterectomy, post menopausal    Interval history:  Today, pt returns following injectafer x2. Her symptoms have improved. SOB improved, no chest pain, no fevers. She also had a colonoscopy and EGD on 8/14/19. Found hernia and polyp. Path showed tubular adenoma and recs are to f/u with another colonoscopy in 7 years.     Past Medical History:   Past Medical History:   Diagnosis Date    Asthma     Hyperlipidemia     Hypertension     IZABEL (obstructive sleep apnea)     Pre-diabetes      Past Surgical HIstory:   Past Surgical History:   Procedure Laterality Date    BREAST BIOPSY Left     benign    BREAST LUMPECTOMY Left     COLONOSCOPY N/A 8/14/2019    [facility]formed by [facility] ([facility]ND [facility])    EGD (ESOPHAGOGASTRODUODENOSCOPY) N/A 8/14/2019    Performed by [facility] at [facility] (2ND FLR)    PARTIAL HYSTERECTOMY      uterus removed     Family History:   Family History   Problem Relation Age of Onset    Hypertension Mother     Coronary artery disease Father     Coronary artery disease Brother     Breast cancer Paternal Aunt     Colon cancer Neg Hx     Inflammatory bowel disease Neg Hx     Crohn's disease Neg Hx     Ulcerative colitis Neg Hx      Social History:  reports that she quit smoking about 20 years ago. She has never used smokeless tobacco. She reports that she drinks alcohol. She reports that she does not use drugs.  Allergies:  Review of patient's allergies indicates:  No Known Allergies  Medications:  Current Outpatient Medications   Medication Sig Dispense Refill    albuterol (VENTOLIN HFA) 90 mcg/actuation inhaler Inhale 2 puffs into the lungs every 6 (six) hours as needed for Wheezing. Rescue 8 g 2    cholecalciferol, vitamin D3, 50,000 unit capsule TK 1 C PO WEEKLY  3     ergocalciferol (VITAMIN D2) 50,000 unit Cap Take 1 capsule (50,000 Units total) by mouth every 7 days. 30 capsule 2    ferrous gluconate (FERGON) 324 MG tablet Take 1 tablet (324 mg total) by mouth 2 times daily 2 hours after meal. 60 tablet 2    ferrous sulfate (FEOSOL) 325 mg (65 mg iron) Tab tablet Take 325 mg by mouth once daily.      FLUoxetine 10 MG capsule TAKE 2 CAPSULES(20 MG) BY MOUTH EVERY  capsule 2    fluticasone (FLONASE) 50 mcg/actuation nasal spray 1 spray by Each Nare route once daily. 16 g 11    montelukast (SINGULAIR) 10 mg tablet TAKE 1 TABLET BY MOUTH EVERY EVENING 90 tablet 0    multivit-min-iron-FA-lutein (CENTRUM SILVER WOMEN) 8 mg iron-400 mcg-300 mcg Tab Take 1 tablet by mouth once daily.      olmesartan-hydrochlorothiazide (BENICAR HCT) 40-12.5 mg Tab TK 1 T PO D  3    pantoprazole (PROTONIX) 20 MG tablet Take 2 tablets (40 mg total) by mouth once daily. 30 tablet 0    polyethylene glycol (GLYCOLAX) 17 gram/dose powder Take 17 g by mouth once daily. 510 g 0    tiZANidine (ZANAFLEX) 4 MG tablet TAKE 1 TABLET(4 MG) BY MOUTH EVERY 12 HOURS AS NEEDED 20 tablet 0     No current facility-administered medications for this visit.      Review of Systems  Constitutional: Negative for chills and fever. Positive for fatigue which is improving  HENT: Negative for congestion and nosebleeds.  Bleeding with recent tooth extraction improved  Eyes: Negative for redness.   Respiratory: Negative for cough, sputum production, shortness of breath and wheezing.    Cardiovascular:  Negative for chest pain, leg swelling, and palpitations.   Gastrointestinal: Negative for abdominal pain, blood in stool, constipation, nausea and vomiting.   Genitourinary: Negative for dysuria, frequency and hematuria.   Musculoskeletal: Negative for back pain, falls, joint pain and myalgias.   Skin: Neg for rash or wounds  Neurological: Negative for dizziness, tingling and headaches.   Psychiatric/Behavioral: The  "patient is not nervous/anxious.   Past medical, surgical, social history reviewed with patient.      Objective:      Vitals:   Vitals:    08/27/19 0956   BP: (!) 187/73   Pulse: 83   Resp: 18   Temp: 98 °F (36.7 °C)   TempSrc: Oral   SpO2: 99%   Weight: 124 kg (273 lb 5.9 oz)   Height: 5' 5" (1.651 m)     BMI: Body mass index is 45.49 kg/m².  Physical Exam  General - well developed, well nourished, no apparent distress  HEENT - oropharynx clear  Chest and Lung - clear to auscultation   Cardiovascular - RRR with no MGR, normal S1 and S2. Trace swelling to BLE noted  Abdomen-  soft, no palpable hepatomegaly or splenomegaly; no abdominal tenderness  Heme - no bruising, petechiae, pallor  Skin - no wounds or rashes noted  Psych - appropriate mood and affect    Laboratory Data:  No visits with results within 1 Week(s) from this visit.   Latest known visit with results is:   Lab Visit on 08/05/2019   Component Date Value Ref Range Status    WBC 08/05/2019 5.53  3.90 - 12.70 K/uL Final    RBC 08/05/2019 4.10  4.00 - 5.40 M/uL Final    Hemoglobin 08/05/2019 9.6* 12.0 - 16.0 g/dL Final    Hematocrit 08/05/2019 32.8* 37.0 - 48.5 % Final    Mean Corpuscular Volume 08/05/2019 80* 82 - 98 fL Final    Mean Corpuscular Hemoglobin 08/05/2019 23.4* 27.0 - 31.0 pg Final    Mean Corpuscular Hemoglobin Conc 08/05/2019 29.3* 32.0 - 36.0 g/dL Final    RDW 08/05/2019 33.6* 11.5 - 14.5 % Final    Platelets 08/05/2019 294  150 - 350 K/uL Final    MPV 08/05/2019 10.8  9.2 - 12.9 fL Final    Immature Granulocytes 08/05/2019 0.4  0.0 - 0.5 % Final    Gran # (ANC) 08/05/2019 3.7  1.8 - 7.7 K/uL Final    Immature Grans (Abs) 08/05/2019 0.02  0.00 - 0.04 K/uL Final    Comment: Mild elevation in immature granulocytes is non specific and   can be seen in a variety of conditions including stress response,   acute inflammation, trauma and pregnancy. Correlation with other   laboratory and clinical findings is essential.      Lymph # " 08/05/2019 1.2  1.0 - 4.8 K/uL Final    Mono # 08/05/2019 0.4  0.3 - 1.0 K/uL Final    Eos # 08/05/2019 0.2  0.0 - 0.5 K/uL Final    Baso # 08/05/2019 0.05  0.00 - 0.20 K/uL Final    nRBC 08/05/2019 0  0 /100 WBC Final    Gran% 08/05/2019 66.6  38.0 - 73.0 % Final    Lymph% 08/05/2019 21.5  18.0 - 48.0 % Final    Mono% 08/05/2019 7.2  4.0 - 15.0 % Final    Eosinophil% 08/05/2019 3.4  0.0 - 8.0 % Final    Basophil% 08/05/2019 0.9  0.0 - 1.9 % Final    Differential Method 08/05/2019 Automated   Final    Sodium 08/05/2019 143  136 - 145 mmol/L Final    Potassium 08/05/2019 4.2  3.5 - 5.1 mmol/L Final    Chloride 08/05/2019 108  95 - 110 mmol/L Final    CO2 08/05/2019 27  23 - 29 mmol/L Final    Glucose 08/05/2019 103  70 - 110 mg/dL Final    BUN, Bld 08/05/2019 18  8 - 23 mg/dL Final    Creatinine 08/05/2019 0.7  0.5 - 1.4 mg/dL Final    Calcium 08/05/2019 10.3  8.7 - 10.5 mg/dL Final    Total Protein 08/05/2019 6.7  6.0 - 8.4 g/dL Final    Albumin 08/05/2019 3.1* 3.5 - 5.2 g/dL Final    Total Bilirubin 08/05/2019 0.5  0.1 - 1.0 mg/dL Final    Comment: For infants and newborns, interpretation of results should be based  on gestational age, weight and in agreement with clinical  observations.  Premature Infant recommended reference ranges:  Up to 24 hours.............<8.0 mg/dL  Up to 48 hours............<12.0 mg/dL  3-5 days..................<15.0 mg/dL  6-29 days.................<15.0 mg/dL      Alkaline Phosphatase 08/05/2019 88  55 - 135 U/L Final    AST 08/05/2019 15  10 - 40 U/L Final    ALT 08/05/2019 14  10 - 44 U/L Final    Anion Gap 08/05/2019 8  8 - 16 mmol/L Final    eGFR if African American 08/05/2019 >60.0  >60 mL/min/1.73 m^2 Final    eGFR if non African American 08/05/2019 >60.0  >60 mL/min/1.73 m^2 Final    Comment: Calculation used to obtain the estimated glomerular filtration  rate (eGFR) is the CKD-EPI equation.       Iron 08/05/2019 31  30 - 160 ug/dL Final     Transferrin 08/05/2019 285  200 - 375 mg/dL Final    TIBC 08/05/2019 422  250 - 450 ug/dL Final    Saturated Iron 08/05/2019 7* 20 - 50 % Final    Ferritin 08/05/2019 38  20.0 - 300.0 ng/mL Final    Folate 08/05/2019 11.6  4.0 - 24.0 ng/mL Final    Vitamin B-12 08/05/2019 334  210 - 950 pg/mL Final    LD 08/05/2019 177  110 - 260 U/L Final    Results are increased in hemolyzed samples.    Protein, Serum 08/05/2019 6.4  6.0 - 8.4 g/dL Final    Comment: Serum protein electrophoresis and immunofixation results should be   interpreted in clinical context in that some therapeutic agents can   result   in false positive results (example, daratumumab). Correlation with   the   patient s therapeutic regimen is required.      Albumin grams/dl 08/05/2019 3.57  3.35 - 5.55 g/dL Final    Alpha-1 grams/dl 08/05/2019 0.31  0.17 - 0.41 g/dL Final    Alpha-2 grams/dl 08/05/2019 0.79  0.43 - 0.99 g/dL Final    Beta grams/dl 08/05/2019 0.79  0.50 - 1.10 g/dL Final    Gamma grams/dl 08/05/2019 0.95  0.67 - 1.58 g/dL Final    Immunofix Interp. 08/05/2019 SEE COMMENT   Final    Comment: Serum protein electrophoresis and immunofixation results should be   interpreted in clinical context in that some therapeutic agents can   result   in false positive results (example, daratumumab). Correlation with   the   patient s therapeutic regimen is required.  See pathologist's interpretation.      Haptoglobin 08/05/2019 156  30 - 250 mg/dL Final    Retic 08/05/2019 0.8  0.5 - 2.5 % Final    Sol. Transferrin Receptor 08/05/2019 15.0* 1.8 - 4.6 mg/L Final    Comment: -------------------ADDITIONAL INFORMATION-------------------  It is reported that    Americans may   have slightly   higher values.  Test Performed by:  [address] [address]      Pathologist Interpretation CASSANDRA 08/05/2019 REVIEWED   Final    Comment: Electronically reviewed and signed  by:  Swetha Beard MD  Signed on 08/06/19 at 13:41  No monoclonal peaks identified.      Pathologist Interpretation SPE 08/05/2019 REVIEWED   Final    Comment: Electronically reviewed and signed by:  Swetha Beard MD  Signed on 08/06/19 at 13:42  Normal total protein, normal pattern.         Assessment:       1. Other iron deficiency anemia         Plan:     LIANE  Workup was negative (spep, electrophoresis, immunofixation, haptoglobin, ldh, b12)  Saw GI for EGD/colonoscopy on 8/14/19 and was found to have polyp and hernia; path c/w tubular adenoma. No bleeding noted. Plan for repeat colonoscopy in 7 years  Continue with PO oral iron TID (if able to tolerate) as previously discussed at last visit. Pt reports taking about BID.   Had injectafer x2 8/2019; with improved ferritin and cbc.  Plan for labs in 3 months and labs and f/u in 6 months    Follow up:   Labs (CBC, CMP, iron/tibc, ferritin) in 3 months   Same labs and visit with NP in 6 months    Wendi Degroot DNP, NP  Hematology/Oncology

## 2019-10-29 NOTE — PROGRESS NOTES
Subjective:       Patient ID: Amanda James is a 69 y.o. female.    Chief Complaint: Follow-up    Patient is a 69 y.o.female who presents today for annual      Labs due now  Vaccines  Gyn ysterectomy  Mammogram may 2019  dexa 2019  Colon 2019    - taking oral iron bid now. Had injectafer x2 8/2019; with improved ferritin and cbc    - arthritis: has been using cbd oil and it helps.  Review of Systems   Constitutional: Negative for appetite change, chills, diaphoresis and fever.   HENT: Negative for congestion, ear discharge, ear pain, postnasal drip, tinnitus, trouble swallowing and voice change.    Eyes: Negative for discharge, redness and itching.   Respiratory: Negative for cough, chest tightness, shortness of breath and wheezing.    Cardiovascular: Negative for chest pain, palpitations and leg swelling.   Gastrointestinal: Negative for abdominal pain, constipation, diarrhea, nausea and vomiting.   Endocrine: Negative for cold intolerance and heat intolerance.   Genitourinary: Negative for difficulty urinating, flank pain, hematuria and urgency.   Musculoskeletal: Negative for arthralgias, gait problem, myalgias and neck stiffness.   Skin: Negative for color change and rash.   Neurological: Negative for dizziness, seizures, syncope and headaches.   Hematological: Negative for adenopathy.   Psychiatric/Behavioral: Negative for agitation, behavioral problems, confusion and sleep disturbance.       Objective:      Physical Exam   Constitutional: She is oriented to person, place, and time. She appears well-developed and well-nourished. No distress.   HENT:   Head: Normocephalic and atraumatic.   Right Ear: External ear normal.   Left Ear: External ear normal.   Nose: Nose normal.   Mouth/Throat: Oropharynx is clear and moist. No oropharyngeal exudate.   Eyes: Pupils are equal, round, and reactive to light. Conjunctivae and EOM are normal. Right eye exhibits no discharge. Left eye exhibits no discharge. No scleral  icterus.   Neck: Neck supple. No JVD present. No tracheal deviation present. No thyromegaly present.   Cardiovascular: Normal rate, normal heart sounds and intact distal pulses. Exam reveals no gallop and no friction rub.   No murmur heard.  Pulmonary/Chest: Effort normal and breath sounds normal. No stridor. No respiratory distress. She has no wheezes. She has no rales. She exhibits no tenderness.   Abdominal: Soft. Bowel sounds are normal. She exhibits no distension. There is no tenderness. There is no rebound.   Musculoskeletal: She exhibits no edema or tenderness.   Lymphadenopathy:     She has no cervical adenopathy.   Neurological: She is alert and oriented to person, place, and time.   Skin: Skin is warm and dry. No rash noted. She is not diaphoretic. No erythema.   Psychiatric: She has a normal mood and affect. Her behavior is normal.   Nursing note and vitals reviewed.      Assessment and Plan:       1. Morbid obesity    - CBC auto differential; Future  - Comprehensive metabolic panel; Future  - Lipid panel; Future  - Vitamin D; Future  - Urinalysis; Future  - TSH; Future  - Iron and TIBC; Future  - Ferritin; Future  - Hemoglobin A1c; Future    2. Pre-diabetes    - CBC auto differential; Future  - Comprehensive metabolic panel; Future  - Lipid panel; Future  - Vitamin D; Future  - Urinalysis; Future  - TSH; Future  - Iron and TIBC; Future  - Ferritin; Future  - Hemoglobin A1c; Future    3. Vitamin D deficiency    - CBC auto differential; Future  - Comprehensive metabolic panel; Future  - Lipid panel; Future  - Vitamin D; Future  - Urinalysis; Future  - TSH; Future  - Iron and TIBC; Future  - Ferritin; Future  - Hemoglobin A1c; Future    4. Other iron deficiency anemia    - CBC auto differential; Future  - Comprehensive metabolic panel; Future  - Lipid panel; Future  - Vitamin D; Future  - Urinalysis; Future  - TSH; Future  - Iron and TIBC; Future  - Ferritin; Future  - Hemoglobin A1c; Future    5.  Hypertension, unspecified type  - restart olmesartan /hctz; notify clinic of readings in one week  - CBC auto differential; Future  - Comprehensive metabolic panel; Future  - Lipid panel; Future  - Vitamin D; Future  - Urinalysis; Future  - TSH; Future  - Iron and TIBC; Future  - Ferritin; Future  - Hemoglobin A1c; Future    6. Family history of cardiac disorder    - Ambulatory Referral to Cardiology    7. IZABEL (obstructive sleep apnea)    - Ambulatory referral to Sleep Disorders          No follow-ups on file.

## 2019-11-12 ENCOUNTER — OFFICE VISIT (OUTPATIENT)
Dept: INTERNAL MEDICINE | Facility: CLINIC | Age: 69
End: 2019-11-12
Payer: MEDICARE

## 2019-11-12 ENCOUNTER — LAB VISIT (OUTPATIENT)
Dept: LAB | Facility: HOSPITAL | Age: 69
End: 2019-11-12
Attending: INTERNAL MEDICINE
Payer: MEDICARE

## 2019-11-12 ENCOUNTER — PATIENT MESSAGE (OUTPATIENT)
Dept: INTERNAL MEDICINE | Facility: CLINIC | Age: 69
End: 2019-11-12

## 2019-11-12 ENCOUNTER — TELEPHONE (OUTPATIENT)
Dept: INTERNAL MEDICINE | Facility: CLINIC | Age: 69
End: 2019-11-12

## 2019-11-12 VITALS
BODY MASS INDEX: 45.62 KG/M2 | DIASTOLIC BLOOD PRESSURE: 88 MMHG | HEIGHT: 65 IN | SYSTOLIC BLOOD PRESSURE: 168 MMHG | TEMPERATURE: 98 F | RESPIRATION RATE: 16 BRPM | WEIGHT: 273.81 LBS | HEART RATE: 79 BPM

## 2019-11-12 DIAGNOSIS — E66.01 MORBID OBESITY: ICD-10-CM

## 2019-11-12 DIAGNOSIS — R73.03 PRE-DIABETES: ICD-10-CM

## 2019-11-12 DIAGNOSIS — I10 HYPERTENSION, UNSPECIFIED TYPE: Chronic | ICD-10-CM

## 2019-11-12 DIAGNOSIS — Z82.49 FAMILY HISTORY OF CARDIAC DISORDER: ICD-10-CM

## 2019-11-12 DIAGNOSIS — E66.01 MORBID OBESITY: Primary | ICD-10-CM

## 2019-11-12 DIAGNOSIS — G47.33 OSA (OBSTRUCTIVE SLEEP APNEA): ICD-10-CM

## 2019-11-12 DIAGNOSIS — D50.8 OTHER IRON DEFICIENCY ANEMIA: ICD-10-CM

## 2019-11-12 DIAGNOSIS — E55.9 VITAMIN D DEFICIENCY: ICD-10-CM

## 2019-11-12 LAB
25(OH)D3+25(OH)D2 SERPL-MCNC: 24 NG/ML (ref 30–96)
ALBUMIN SERPL BCP-MCNC: 3.5 G/DL (ref 3.5–5.2)
ALP SERPL-CCNC: 88 U/L (ref 55–135)
ALT SERPL W/O P-5'-P-CCNC: 18 U/L (ref 10–44)
ANION GAP SERPL CALC-SCNC: 7 MMOL/L (ref 8–16)
AST SERPL-CCNC: 18 U/L (ref 10–40)
BASOPHILS # BLD AUTO: 0.02 K/UL (ref 0–0.2)
BASOPHILS NFR BLD: 0.4 % (ref 0–1.9)
BILIRUB SERPL-MCNC: 0.7 MG/DL (ref 0.1–1)
BUN SERPL-MCNC: 14 MG/DL (ref 8–23)
CALCIUM SERPL-MCNC: 9.9 MG/DL (ref 8.7–10.5)
CHLORIDE SERPL-SCNC: 109 MMOL/L (ref 95–110)
CHOLEST SERPL-MCNC: 244 MG/DL (ref 120–199)
CHOLEST/HDLC SERPL: 5.8 {RATIO} (ref 2–5)
CO2 SERPL-SCNC: 27 MMOL/L (ref 23–29)
CREAT SERPL-MCNC: 0.7 MG/DL (ref 0.5–1.4)
DIFFERENTIAL METHOD: ABNORMAL
EOSINOPHIL # BLD AUTO: 0.1 K/UL (ref 0–0.5)
EOSINOPHIL NFR BLD: 1.6 % (ref 0–8)
ERYTHROCYTE [DISTWIDTH] IN BLOOD BY AUTOMATED COUNT: 14.5 % (ref 11.5–14.5)
EST. GFR  (AFRICAN AMERICAN): >60 ML/MIN/1.73 M^2
EST. GFR  (NON AFRICAN AMERICAN): >60 ML/MIN/1.73 M^2
ESTIMATED AVG GLUCOSE: 126 MG/DL (ref 68–131)
FERRITIN SERPL-MCNC: 142 NG/ML (ref 20–300)
GLUCOSE SERPL-MCNC: 95 MG/DL (ref 70–110)
HBA1C MFR BLD HPLC: 6 % (ref 4–5.6)
HCT VFR BLD AUTO: 45.1 % (ref 37–48.5)
HDLC SERPL-MCNC: 42 MG/DL (ref 40–75)
HDLC SERPL: 17.2 % (ref 20–50)
HGB BLD-MCNC: 13.9 G/DL (ref 12–16)
IMM GRANULOCYTES # BLD AUTO: 0 K/UL (ref 0–0.04)
IMM GRANULOCYTES NFR BLD AUTO: 0 % (ref 0–0.5)
IRON SERPL-MCNC: 77 UG/DL (ref 30–160)
LDLC SERPL CALC-MCNC: 182 MG/DL (ref 63–159)
LYMPHOCYTES # BLD AUTO: 1.6 K/UL (ref 1–4.8)
LYMPHOCYTES NFR BLD: 32.8 % (ref 18–48)
MCH RBC QN AUTO: 30.2 PG (ref 27–31)
MCHC RBC AUTO-ENTMCNC: 30.8 G/DL (ref 32–36)
MCV RBC AUTO: 98 FL (ref 82–98)
MONOCYTES # BLD AUTO: 0.3 K/UL (ref 0.3–1)
MONOCYTES NFR BLD: 6.7 % (ref 4–15)
NEUTROPHILS # BLD AUTO: 2.9 K/UL (ref 1.8–7.7)
NEUTROPHILS NFR BLD: 58.5 % (ref 38–73)
NONHDLC SERPL-MCNC: 202 MG/DL
NRBC BLD-RTO: 0 /100 WBC
PLATELET # BLD AUTO: 174 K/UL (ref 150–350)
PMV BLD AUTO: 10.6 FL (ref 9.2–12.9)
POTASSIUM SERPL-SCNC: 4.2 MMOL/L (ref 3.5–5.1)
PROT SERPL-MCNC: 6.8 G/DL (ref 6–8.4)
RBC # BLD AUTO: 4.6 M/UL (ref 4–5.4)
SATURATED IRON: 28 % (ref 20–50)
SODIUM SERPL-SCNC: 143 MMOL/L (ref 136–145)
TOTAL IRON BINDING CAPACITY: 277 UG/DL (ref 250–450)
TRANSFERRIN SERPL-MCNC: 187 MG/DL (ref 200–375)
TRIGL SERPL-MCNC: 100 MG/DL (ref 30–150)
TSH SERPL DL<=0.005 MIU/L-ACNC: 2.48 UIU/ML (ref 0.4–4)
WBC # BLD AUTO: 4.94 K/UL (ref 3.9–12.7)

## 2019-11-12 PROCEDURE — 82306 VITAMIN D 25 HYDROXY: CPT

## 2019-11-12 PROCEDURE — 99214 OFFICE O/P EST MOD 30 MIN: CPT | Mod: S$GLB,,, | Performed by: INTERNAL MEDICINE

## 2019-11-12 PROCEDURE — 36415 COLL VENOUS BLD VENIPUNCTURE: CPT | Mod: PO

## 2019-11-12 PROCEDURE — 83540 ASSAY OF IRON: CPT

## 2019-11-12 PROCEDURE — 99214 PR OFFICE/OUTPT VISIT, EST, LEVL IV, 30-39 MIN: ICD-10-PCS | Mod: S$GLB,,, | Performed by: INTERNAL MEDICINE

## 2019-11-12 PROCEDURE — 83036 HEMOGLOBIN GLYCOSYLATED A1C: CPT

## 2019-11-12 PROCEDURE — 3079F DIAST BP 80-89 MM HG: CPT | Mod: CPTII,S$GLB,, | Performed by: INTERNAL MEDICINE

## 2019-11-12 PROCEDURE — 84443 ASSAY THYROID STIM HORMONE: CPT

## 2019-11-12 PROCEDURE — 3077F PR MOST RECENT SYSTOLIC BLOOD PRESSURE >= 140 MM HG: ICD-10-PCS | Mod: CPTII,S$GLB,, | Performed by: INTERNAL MEDICINE

## 2019-11-12 PROCEDURE — 82728 ASSAY OF FERRITIN: CPT

## 2019-11-12 PROCEDURE — 1101F PR PT FALLS ASSESS DOC 0-1 FALLS W/OUT INJ PAST YR: ICD-10-PCS | Mod: CPTII,S$GLB,, | Performed by: INTERNAL MEDICINE

## 2019-11-12 PROCEDURE — 80061 LIPID PANEL: CPT

## 2019-11-12 PROCEDURE — 3079F PR MOST RECENT DIASTOLIC BLOOD PRESSURE 80-89 MM HG: ICD-10-PCS | Mod: CPTII,S$GLB,, | Performed by: INTERNAL MEDICINE

## 2019-11-12 PROCEDURE — 80053 COMPREHEN METABOLIC PANEL: CPT

## 2019-11-12 PROCEDURE — 99999 PR PBB SHADOW E&M-EST. PATIENT-LVL IV: ICD-10-PCS | Mod: PBBFAC,,, | Performed by: INTERNAL MEDICINE

## 2019-11-12 PROCEDURE — 99999 PR PBB SHADOW E&M-EST. PATIENT-LVL IV: CPT | Mod: PBBFAC,,, | Performed by: INTERNAL MEDICINE

## 2019-11-12 PROCEDURE — 1101F PT FALLS ASSESS-DOCD LE1/YR: CPT | Mod: CPTII,S$GLB,, | Performed by: INTERNAL MEDICINE

## 2019-11-12 PROCEDURE — 3077F SYST BP >= 140 MM HG: CPT | Mod: CPTII,S$GLB,, | Performed by: INTERNAL MEDICINE

## 2019-11-12 PROCEDURE — 85025 COMPLETE CBC W/AUTO DIFF WBC: CPT

## 2019-11-12 RX ORDER — DICLOFENAC SODIUM 30 MG/G
GEL TOPICAL
Qty: 100 G | Refills: 0 | Status: SHIPPED | OUTPATIENT
Start: 2019-11-12 | End: 2021-05-04

## 2019-11-13 NOTE — TELEPHONE ENCOUNTER
Notify pt:      -Blood counts are normal; iron levels are stable   -Electrolytes, kidney and liver function are normal   - diabetic marker is in a prediabetic range; monitor sugar intake in diet for now   -Thyroid function is normal   - vitamin D is low; start taking 2000 units over the counter daily   -Cholesterol panel is high; would you be willing to try a low dose of lipitor to aid in lowering this value?

## 2019-11-13 NOTE — TELEPHONE ENCOUNTER
Spoke to pt and informed of lab results, pt prefers to hold off on medication and wants to try diet first. Diet mailed to pt.

## 2020-02-06 ENCOUNTER — PATIENT MESSAGE (OUTPATIENT)
Dept: SLEEP MEDICINE | Facility: CLINIC | Age: 70
End: 2020-02-06

## 2020-02-10 ENCOUNTER — PATIENT OUTREACH (OUTPATIENT)
Dept: ADMINISTRATIVE | Facility: OTHER | Age: 70
End: 2020-02-10

## 2020-02-11 NOTE — PROGRESS NOTES
Chart reviewed.   Immunizations: Triggered Imm Registry     Orders placed: n/a  Upcoming appts to satisfy [facility] topics: n/a

## 2020-02-26 ENCOUNTER — LAB VISIT (OUTPATIENT)
Dept: LAB | Facility: HOSPITAL | Age: 70
End: 2020-02-26
Payer: MEDICARE

## 2020-02-26 ENCOUNTER — OFFICE VISIT (OUTPATIENT)
Dept: HEMATOLOGY/ONCOLOGY | Facility: CLINIC | Age: 70
End: 2020-02-26
Payer: MEDICARE

## 2020-02-26 VITALS
BODY MASS INDEX: 46.58 KG/M2 | SYSTOLIC BLOOD PRESSURE: 177 MMHG | DIASTOLIC BLOOD PRESSURE: 77 MMHG | HEIGHT: 65 IN | RESPIRATION RATE: 16 BRPM | OXYGEN SATURATION: 98 % | WEIGHT: 279.56 LBS | HEART RATE: 78 BPM | TEMPERATURE: 98 F

## 2020-02-26 DIAGNOSIS — D50.8 OTHER IRON DEFICIENCY ANEMIA: Primary | ICD-10-CM

## 2020-02-26 DIAGNOSIS — D50.9 IRON DEFICIENCY ANEMIA, UNSPECIFIED IRON DEFICIENCY ANEMIA TYPE: ICD-10-CM

## 2020-02-26 DIAGNOSIS — E55.9 VITAMIN D DEFICIENCY: ICD-10-CM

## 2020-02-26 LAB
ALBUMIN SERPL BCP-MCNC: 3.4 G/DL (ref 3.5–5.2)
ALP SERPL-CCNC: 88 U/L (ref 55–135)
ALT SERPL W/O P-5'-P-CCNC: 11 U/L (ref 10–44)
ANION GAP SERPL CALC-SCNC: 6 MMOL/L (ref 8–16)
AST SERPL-CCNC: 13 U/L (ref 10–40)
BASOPHILS # BLD AUTO: 0.03 K/UL (ref 0–0.2)
BASOPHILS NFR BLD: 0.7 % (ref 0–1.9)
BILIRUB SERPL-MCNC: 0.5 MG/DL (ref 0.1–1)
BUN SERPL-MCNC: 13 MG/DL (ref 8–23)
CALCIUM SERPL-MCNC: 10.2 MG/DL (ref 8.7–10.5)
CHLORIDE SERPL-SCNC: 107 MMOL/L (ref 95–110)
CO2 SERPL-SCNC: 30 MMOL/L (ref 23–29)
CREAT SERPL-MCNC: 0.8 MG/DL (ref 0.5–1.4)
DIFFERENTIAL METHOD: ABNORMAL
EOSINOPHIL # BLD AUTO: 0.1 K/UL (ref 0–0.5)
EOSINOPHIL NFR BLD: 2 % (ref 0–8)
ERYTHROCYTE [DISTWIDTH] IN BLOOD BY AUTOMATED COUNT: 14.2 % (ref 11.5–14.5)
EST. GFR  (AFRICAN AMERICAN): >60 ML/MIN/1.73 M^2
EST. GFR  (NON AFRICAN AMERICAN): >60 ML/MIN/1.73 M^2
FERRITIN SERPL-MCNC: 64 NG/ML (ref 20–300)
GLUCOSE SERPL-MCNC: 100 MG/DL (ref 70–110)
HCT VFR BLD AUTO: 43.1 % (ref 37–48.5)
HGB BLD-MCNC: 13.2 G/DL (ref 12–16)
IMM GRANULOCYTES # BLD AUTO: 0.01 K/UL (ref 0–0.04)
IMM GRANULOCYTES NFR BLD AUTO: 0.2 % (ref 0–0.5)
IRON SERPL-MCNC: 77 UG/DL (ref 30–160)
LYMPHOCYTES # BLD AUTO: 1.3 K/UL (ref 1–4.8)
LYMPHOCYTES NFR BLD: 29.7 % (ref 18–48)
MCH RBC QN AUTO: 31 PG (ref 27–31)
MCHC RBC AUTO-ENTMCNC: 30.6 G/DL (ref 32–36)
MCV RBC AUTO: 101 FL (ref 82–98)
MONOCYTES # BLD AUTO: 0.3 K/UL (ref 0.3–1)
MONOCYTES NFR BLD: 7 % (ref 4–15)
NEUTROPHILS # BLD AUTO: 2.7 K/UL (ref 1.8–7.7)
NEUTROPHILS NFR BLD: 60.4 % (ref 38–73)
NRBC BLD-RTO: 0 /100 WBC
PLATELET # BLD AUTO: 224 K/UL (ref 150–350)
PMV BLD AUTO: 11.6 FL (ref 9.2–12.9)
POTASSIUM SERPL-SCNC: 4.6 MMOL/L (ref 3.5–5.1)
PROT SERPL-MCNC: 6.7 G/DL (ref 6–8.4)
RBC # BLD AUTO: 4.26 M/UL (ref 4–5.4)
SATURATED IRON: 26 % (ref 20–50)
SODIUM SERPL-SCNC: 143 MMOL/L (ref 136–145)
TOTAL IRON BINDING CAPACITY: 296 UG/DL (ref 250–450)
TRANSFERRIN SERPL-MCNC: 200 MG/DL (ref 200–375)
WBC # BLD AUTO: 4.45 K/UL (ref 3.9–12.7)

## 2020-02-26 PROCEDURE — 36415 COLL VENOUS BLD VENIPUNCTURE: CPT

## 2020-02-26 PROCEDURE — 83540 ASSAY OF IRON: CPT

## 2020-02-26 PROCEDURE — 3078F PR MOST RECENT DIASTOLIC BLOOD PRESSURE < 80 MM HG: ICD-10-PCS | Mod: CPTII,S$GLB,, | Performed by: NURSE PRACTITIONER

## 2020-02-26 PROCEDURE — 99999 PR PBB SHADOW E&M-EST. PATIENT-LVL III: ICD-10-PCS | Mod: PBBFAC,,, | Performed by: NURSE PRACTITIONER

## 2020-02-26 PROCEDURE — 3077F SYST BP >= 140 MM HG: CPT | Mod: CPTII,S$GLB,, | Performed by: NURSE PRACTITIONER

## 2020-02-26 PROCEDURE — 1126F PR PAIN SEVERITY QUANTIFIED, NO PAIN PRESENT: ICD-10-PCS | Mod: S$GLB,,, | Performed by: NURSE PRACTITIONER

## 2020-02-26 PROCEDURE — 1101F PT FALLS ASSESS-DOCD LE1/YR: CPT | Mod: CPTII,S$GLB,, | Performed by: NURSE PRACTITIONER

## 2020-02-26 PROCEDURE — 1159F PR MEDICATION LIST DOCUMENTED IN MEDICAL RECORD: ICD-10-PCS | Mod: S$GLB,,, | Performed by: NURSE PRACTITIONER

## 2020-02-26 PROCEDURE — 85025 COMPLETE CBC W/AUTO DIFF WBC: CPT

## 2020-02-26 PROCEDURE — 1159F MED LIST DOCD IN RCRD: CPT | Mod: S$GLB,,, | Performed by: NURSE PRACTITIONER

## 2020-02-26 PROCEDURE — 80053 COMPREHEN METABOLIC PANEL: CPT

## 2020-02-26 PROCEDURE — 1126F AMNT PAIN NOTED NONE PRSNT: CPT | Mod: S$GLB,,, | Performed by: NURSE PRACTITIONER

## 2020-02-26 PROCEDURE — 99999 PR PBB SHADOW E&M-EST. PATIENT-LVL III: CPT | Mod: PBBFAC,,, | Performed by: NURSE PRACTITIONER

## 2020-02-26 PROCEDURE — 99213 PR OFFICE/OUTPT VISIT, EST, LEVL III, 20-29 MIN: ICD-10-PCS | Mod: S$GLB,,, | Performed by: NURSE PRACTITIONER

## 2020-02-26 PROCEDURE — 1101F PR PT FALLS ASSESS DOC 0-1 FALLS W/OUT INJ PAST YR: ICD-10-PCS | Mod: CPTII,S$GLB,, | Performed by: NURSE PRACTITIONER

## 2020-02-26 PROCEDURE — 99213 OFFICE O/P EST LOW 20 MIN: CPT | Mod: S$GLB,,, | Performed by: NURSE PRACTITIONER

## 2020-02-26 PROCEDURE — 82728 ASSAY OF FERRITIN: CPT

## 2020-02-26 PROCEDURE — 3078F DIAST BP <80 MM HG: CPT | Mod: CPTII,S$GLB,, | Performed by: NURSE PRACTITIONER

## 2020-02-26 PROCEDURE — 3077F PR MOST RECENT SYSTOLIC BLOOD PRESSURE >= 140 MM HG: ICD-10-PCS | Mod: CPTII,S$GLB,, | Performed by: NURSE PRACTITIONER

## 2020-02-26 RX ORDER — FERROUS SULFATE 325(65) MG
325 TABLET ORAL 2 TIMES DAILY
Qty: 60 TABLET | Refills: 5 | Status: SHIPPED | OUTPATIENT
Start: 2020-02-26 | End: 2020-08-28 | Stop reason: SDUPTHER

## 2020-02-26 RX ORDER — ERGOCALCIFEROL 1.25 MG/1
50000 CAPSULE ORAL
Qty: 12 CAPSULE | Refills: 3 | Status: SHIPPED | OUTPATIENT
Start: 2020-02-26 | End: 2020-08-28 | Stop reason: SDUPTHER

## 2020-02-26 NOTE — PROGRESS NOTES
HEMATOLOGY/ONCOLOGY NEW PATIENT CONSULT NOTE:    PATIENT: Amanda James  MRN: [id number]  DATE: 2/26/2020  Diagnosis:   1. Other iron deficiency anemia      Chief Complaint: No chief complaint on file.    Subjective:    Initial History: Ms. James is a 70 y.o. female who initially presented as a new patient consult for Iron deficiency anemia 8/2019. Reports duration of anemia as long as 3 years. As of 7/25/19, her ferritin level was 2. She presented to the ED on 7/26/19 with symptomatic anemia. Was found to have Hgb in the 5's. She received 3 units of PRBCs. She denied any signs of symptoms of bleeding prior to hospital admission including nosebleeds, hematuria, melena or bright red stools, or hematemesis. She was discharged on 7/27 with a Hgb of 6.8. Patient has a history of GI bleeding, was treated approximately 3 years ago in [place] (EGD, incomplete C-scope). Had appointment with GI on 7/29/19. H. Pylori testing in process. Was started on protonix. EGD and colonoscopy scheduled for 8/14/19. She currently denies any recent evidence bleeding following hospital discharge  She was started on PO iron BID after discharge from the hospital. She also takes a daily multivitamin    Had complaints of fatigue, although it is much improved after blood transfusions. Shortness of breath with exertion has improved since receiving blood transfusions. Denies n/v/d/c. Is compliant at this time with iron supplements.    Past Medical History: Patient has a past medical history of Asthma, Hypertension, and IZABEL (obstructive sleep apnea).     Past Surgical History: Patient has a past surgical history that includes Partial hysterectomy; Breast lumpectomy (Left); and Breast biopsy (Left).     Social History: Patient reports that she quit smoking about 20 years ago. She has never used smokeless tobacco. She denies any current alcohol use. She reports that she does not use drugs.     Family History: Includes Breast cancer in her paternal  aunt; Coronary artery disease in her brother and father; Hypertension in her mother. No significant bleeding disorders in the family    Diet: Patient is not a vegetarian or vegan  GYN: Partial hysterectomy, post menopausal    Interval history:  Today, pt returns following injectafer x2. Her symptoms have improved. SOB improved, no chest pain, no fevers. She also had a colonoscopy and EGD on 8/14/19. Found hernia and polyp. Path showed tubular adenoma and recs are to f/u with another colonoscopy in 7 years. Denies any new signs of symptoms of bleeding. Has stopped taking oral iron.    Past Medical History:   Past Medical History:   Diagnosis Date    Asthma     Hyperlipidemia     Hypertension     IZABEL (obstructive sleep apnea)     Pre-diabetes      Past Surgical HIstory:   Past Surgical History:   Procedure Laterality Date    BREAST BIOPSY Left     benign    BREAST LUMPECTOMY Left     COLONOSCOPY N/A 8/14/2019    Procedure: COLONOSCOPY;  Surgeon: Clinton Gottlieb MD;  Location: [facility] ([address] [place] [address]);  Service: Endoscopy;  Laterality: N/A;  BMI 47.45  IZABEL    ESOPHAGOGASTRODUODENOSCOPY N/A 8/14/2019    Procedure: EGD (ESOPHAGOGASTRODUODENOSCOPY);  Surgeon: Clinton Gottlieb MD;  Location: [facility] ([address] [place] [address]);  Service: Endoscopy;  Laterality: N/A;  BMI 47.45  IZABEL    PARTIAL HYSTERECTOMY      uterus removed     Family History:   Family History   Problem Relation Age of Onset    Hypertension Mother     Coronary artery disease Father     Coronary artery disease Brother     Breast cancer Paternal Aunt     Colon cancer Neg Hx     Inflammatory bowel disease Neg Hx     Crohn's disease Neg Hx     Ulcerative colitis Neg Hx      Social History:  reports that she quit smoking about 20 years ago. She has never used smokeless tobacco. She reports that she drinks alcohol. She reports that she does not use drugs.  Allergies:  Review of patient's allergies indicates:  No Known Allergies  Medications:  Current Outpatient  Medications   Medication Sig Dispense Refill    albuterol (VENTOLIN HFA) 90 mcg/actuation inhaler Inhale 2 puffs into the lungs every 6 (six) hours as needed for Wheezing. Rescue 8 g 2    cholecalciferol, vitamin D3, 50,000 unit capsule TK 1 C PO WEEKLY  3    diclofenac sodium (SOLARAZE) 3 % gel Apply to affected area on skin 4 times daily prn pain 100 g 0    ergocalciferol (VITAMIN D2) 50,000 unit Cap Take 1 capsule (50,000 Units total) by mouth every 7 days. 30 capsule 2    ferrous gluconate (FERGON) 324 MG tablet Take 1 tablet (324 mg total) by mouth 2 times daily 2 hours after meal. 60 tablet 2    ferrous sulfate (FEOSOL) 325 mg (65 mg iron) Tab tablet Take 325 mg by mouth once daily.      FLUoxetine 10 MG capsule TAKE 2 CAPSULES(20 MG) BY MOUTH EVERY  capsule 2    fluticasone (FLONASE) 50 mcg/actuation nasal spray 1 spray by Each Nare route once daily. 16 g 11    montelukast (SINGULAIR) 10 mg tablet TAKE 1 TABLET BY MOUTH EVERY EVENING 90 tablet 0    multivit-min-iron-FA-lutein (CENTRUM SILVER WOMEN) 8 mg iron-400 mcg-300 mcg Tab Take 1 tablet by mouth once daily.      olmesartan-hydrochlorothiazide (BENICAR HCT) 40-12.5 mg Tab TK 1 T PO D  3    pantoprazole (PROTONIX) 20 MG tablet Take 2 tablets (40 mg total) by mouth once daily. 30 tablet 0    polyethylene glycol (GLYCOLAX) 17 gram/dose powder Take 17 g by mouth once daily. 510 g 0    tiZANidine (ZANAFLEX) 4 MG tablet TAKE 1 TABLET(4 MG) BY MOUTH EVERY 12 HOURS AS NEEDED 20 tablet 0     No current facility-administered medications for this visit.      Review of Systems  Constitutional: Negative for chills and fever. Fatigue improved  HENT: Negative for congestion and nosebleeds.  Eyes: Negative for redness.   Respiratory: Negative for cough, sputum production, shortness of breath and wheezing.    Cardiovascular:  Negative for chest pain, leg swelling, and palpitations.   Gastrointestinal: Negative for abdominal pain, blood in stool,  "constipation, nausea and vomiting.   Genitourinary: Negative for dysuria, frequency and hematuria.   Musculoskeletal: Negative for back pain, falls, joint pain and myalgias.   Skin: Neg for rash or wounds  Neurological: Negative for dizziness, tingling and headaches.   Psychiatric/Behavioral: The patient is not nervous/anxious.   Past medical, surgical, social history reviewed with patient.      Objective:      Vitals:   Vitals:    02/26/20 1347   BP: (!) 177/77   BP Location: Left arm   Pulse: 78   Resp: 16   Temp: 98.2 °F (36.8 °C)   SpO2: 98%   Weight: 126.8 kg (279 lb 8.7 oz)   Height: 5' 5" (1.651 m)     BMI: Body mass index is 46.52 kg/m².  Physical Exam  General - well developed, well nourished, no apparent distress  HEENT - oropharynx clear  Chest and Lung - clear to auscultation   Cardiovascular - RRR with no MGR, normal S1 and S2. Trace swelling to BLE noted  Abdomen-  soft, no palpable hepatomegaly or splenomegaly; no abdominal tenderness  Heme - no bruising, petechiae, pallor  Skin - no wounds or rashes noted  Psych - appropriate mood and affect    Laboratory Data:  Lab Visit on 02/26/2020   Component Date Value Ref Range Status    WBC 02/26/2020 4.45  3.90 - 12.70 K/uL Final    RBC 02/26/2020 4.26  4.00 - 5.40 M/uL Final    Hemoglobin 02/26/2020 13.2  12.0 - 16.0 g/dL Final    Hematocrit 02/26/2020 43.1  37.0 - 48.5 % Final    Mean Corpuscular Volume 02/26/2020 101* 82 - 98 fL Final    Mean Corpuscular Hemoglobin 02/26/2020 31.0  27.0 - 31.0 pg Final    Mean Corpuscular Hemoglobin Conc 02/26/2020 30.6* 32.0 - 36.0 g/dL Final    RDW 02/26/2020 14.2  11.5 - 14.5 % Final    Platelets 02/26/2020 224  150 - 350 K/uL Final    MPV 02/26/2020 11.6  9.2 - 12.9 fL Final    Immature Granulocytes 02/26/2020 0.2  0.0 - 0.5 % Final    Gran # (ANC) 02/26/2020 2.7  1.8 - 7.7 K/uL Final    Immature Grans (Abs) 02/26/2020 0.01  0.00 - 0.04 K/uL Final    Comment: Mild elevation in immature granulocytes is non " specific and   can be seen in a variety of conditions including stress response,   acute inflammation, trauma and pregnancy. Correlation with other   laboratory and clinical findings is essential.      Lymph # 02/26/2020 1.3  1.0 - 4.8 K/uL Final    Mono # 02/26/2020 0.3  0.3 - 1.0 K/uL Final    Eos # 02/26/2020 0.1  0.0 - 0.5 K/uL Final    Baso # 02/26/2020 0.03  0.00 - 0.20 K/uL Final    nRBC 02/26/2020 0  0 /100 WBC Final    Gran% 02/26/2020 60.4  38.0 - 73.0 % Final    Lymph% 02/26/2020 29.7  18.0 - 48.0 % Final    Mono% 02/26/2020 7.0  4.0 - 15.0 % Final    Eosinophil% 02/26/2020 2.0  0.0 - 8.0 % Final    Basophil% 02/26/2020 0.7  0.0 - 1.9 % Final    Differential Method 02/26/2020 Automated   Final    Sodium 02/26/2020 143  136 - 145 mmol/L Final    Potassium 02/26/2020 4.6  3.5 - 5.1 mmol/L Final    Chloride 02/26/2020 107  95 - 110 mmol/L Final    CO2 02/26/2020 30* 23 - 29 mmol/L Final    Glucose 02/26/2020 100  70 - 110 mg/dL Final    BUN, Bld 02/26/2020 13  8 - 23 mg/dL Final    Creatinine 02/26/2020 0.8  0.5 - 1.4 mg/dL Final    Calcium 02/26/2020 10.2  8.7 - 10.5 mg/dL Final    Total Protein 02/26/2020 6.7  6.0 - 8.4 g/dL Final    Albumin 02/26/2020 3.4* 3.5 - 5.2 g/dL Final    Total Bilirubin 02/26/2020 0.5  0.1 - 1.0 mg/dL Final    Comment: For infants and newborns, interpretation of results should be based  on gestational age, weight and in agreement with clinical  observations.  Premature Infant recommended reference ranges:  Up to 24 hours.............<8.0 mg/dL  Up to 48 hours............<12.0 mg/dL  3-5 days..................<15.0 mg/dL  6-29 days.................<15.0 mg/dL      Alkaline Phosphatase 02/26/2020 88  55 - 135 U/L Final    AST 02/26/2020 13  10 - 40 U/L Final    ALT 02/26/2020 11  10 - 44 U/L Final    Anion Gap 02/26/2020 6* 8 - 16 mmol/L Final    eGFR if African American 02/26/2020 >60.0  >60 mL/min/1.73 m^2 Final    eGFR if non African American  02/26/2020 >60.0  >60 mL/min/1.73 m^2 Final    Comment: Calculation used to obtain the estimated glomerular filtration  rate (eGFR) is the CKD-EPI equation.       Iron 02/26/2020 77  30 - 160 ug/dL Final    Transferrin 02/26/2020 200  200 - 375 mg/dL Final    TIBC 02/26/2020 296  250 - 450 ug/dL Final    Saturated Iron 02/26/2020 26  20 - 50 % Final    Ferritin 02/26/2020 64  20.0 - 300.0 ng/mL Final       Assessment:       1. Other iron deficiency anemia         Plan:     LIANE  Workup was negative (spep, electrophoresis, immunofixation, haptoglobin, ldh, b12)  Saw GI for EGD/colonoscopy on 8/14/19 and was found to have polyp and hernia; path c/w tubular adenoma. No bleeding noted. Plan for repeat colonoscopy in 7 years  Had injectafer x2 8/2019; with improved ferritin and cbc  Her levels remain stable today. Of note ferritin has dropped from 124 to 62, pt states she has stopped her PO iron  Recommend she continue with PO oral iron BID as able to tolerate. States she stopped taking it back in December as she did not have any refills left on medication. States that with medicine she did not have any side effects. Refill placed today.  Discussed with patient that if labs remain stable at next visit that we can space out to yearly. She wishes to continue follow up with hematology versus just having routine lab follow up with PCP    Vitamin D deficiency  Last level on 11/12/19 was 24. States daily vitamin C upsets up stomach. Will order weekly ergocalciferol as patient able to tolerate better and recheck level at next visit    Follow up:   Labs (CBC, CMP, iron/tibc, ferritin, vitamin D) & visit with NP in 6 months    Jenny Navarro NP  Hematology/Oncology/BMT      Collaborating physician Dr. Sands

## 2020-03-12 ENCOUNTER — PATIENT MESSAGE (OUTPATIENT)
Dept: SLEEP MEDICINE | Facility: CLINIC | Age: 70
End: 2020-03-12

## 2020-03-18 DIAGNOSIS — R09.82 POSTNASAL DRIP: ICD-10-CM

## 2020-03-18 RX ORDER — FLUTICASONE PROPIONATE 50 MCG
1 SPRAY, SUSPENSION (ML) NASAL DAILY
Qty: 16 G | Refills: 11 | Status: SHIPPED | OUTPATIENT
Start: 2020-03-18 | End: 2020-08-28 | Stop reason: SDUPTHER

## 2020-03-18 RX ORDER — MONTELUKAST SODIUM 10 MG/1
10 TABLET ORAL NIGHTLY
Qty: 90 TABLET | Refills: 3 | Status: SHIPPED | OUTPATIENT
Start: 2020-03-18 | End: 2021-02-27

## 2020-03-18 NOTE — TELEPHONE ENCOUNTER
----- Message from Odessa Daniacassie sent at 3/18/2020  2:57 PM CDT -----  Contact: Patient [phone]  Requesting an RX refill or new RX.  Is this a refill or new RX:    RX name and strength: fluticasone (FLONASE) 50 mcg/actuation nasal spray  Directions (copy/paste from chart):    Is this a 30 day or 90 day RX:  90 day  Local pharmacy or mail order pharmacy:    Pharmacy name and phone # (copy/paste from chart):   [facility] [place] - [address] [facility] [phone] (Phone)  [phone] (Fax)    Comments:  Call patient once sent    Requesting an RX refill or new RX.  Is this a refill or new RX:  refill  RX name and strength: montelukast (SINGULAIR) 10 mg tablet  Directions (copy/paste from chart):    Is this a 30 day or 90 day RX:  90 day  Local pharmacy or mail order pharmacy:  local  Pharmacy name and phone # (copy/paste from chart):  [facility] [place] - [address] [facility] [address] [facility] [phone] (Phone)  [phone] (Fax)

## 2020-03-19 ENCOUNTER — TELEPHONE (OUTPATIENT)
Dept: INTERNAL MEDICINE | Facility: CLINIC | Age: 70
End: 2020-03-19

## 2020-03-19 RX ORDER — OLMESARTAN MEDOXOMIL AND HYDROCHLOROTHIAZIDE 40/12.5 40; 12.5 MG/1; MG/1
TABLET ORAL
Qty: 90 TABLET | Refills: 3 | Status: SHIPPED | OUTPATIENT
Start: 2020-03-19 | End: 2021-05-04 | Stop reason: SDUPTHER

## 2020-03-19 NOTE — TELEPHONE ENCOUNTER
----- Message from Hyacinth Lambert sent at 3/19/2020  2:26 PM CDT -----  Contact: patient [phone]  Requesting an RX refill or new RX.  Is this a refill or new RX:  refill  RX name and strength: olmesartan-hydrochlorothiazide (BENICAR HCT) 40-12.5 mg TabDirections 1 tablet daily  Is this a 30 day or 90 day RX:  90  Local pharmacy or mail order pharmacy:    Pharmacy name and phone #    [id number] - [place], [place] - [address] [facility] [phone]    Comments:    Please order this as patient is out of meds and thought she ordered this yesterday.

## 2020-04-15 ENCOUNTER — PATIENT MESSAGE (OUTPATIENT)
Dept: INTERNAL MEDICINE | Facility: CLINIC | Age: 70
End: 2020-04-15

## 2020-04-15 NOTE — TELEPHONE ENCOUNTER
She can book an in office visit to be examined. If she is hesitant , we can do a virtual but would be better if she were examined to rule out a kidney stone

## 2020-06-30 ENCOUNTER — TELEPHONE (OUTPATIENT)
Dept: INTERNAL MEDICINE | Facility: CLINIC | Age: 70
End: 2020-06-30

## 2020-06-30 DIAGNOSIS — M54.9 BACK PAIN, UNSPECIFIED BACK LOCATION, UNSPECIFIED BACK PAIN LATERALITY, UNSPECIFIED CHRONICITY: ICD-10-CM

## 2020-06-30 DIAGNOSIS — Z12.31 ENCOUNTER FOR SCREENING MAMMOGRAM FOR BREAST CANCER: Primary | ICD-10-CM

## 2020-06-30 NOTE — TELEPHONE ENCOUNTER
----- Message from Israel Lincoln sent at 6/30/2020  3:24 PM CDT -----  Regarding: Advice  Contact: Patient [phone]  Patient would like to get medical advice.    Comments: Patient calling stating is having feet pain, and back pain, wants to know if something may be wrong with Kidney, call to discuss further.    Please call an advise  Thank you

## 2020-06-30 NOTE — TELEPHONE ENCOUNTER
----- Message from Israel Lincoln sent at 6/30/2020  3:19 PM CDT -----  Regarding: Advice  Contact: Patient [phone]  Patient would like to get medical advice.    Comments: Patient calling regarding the letter in the mail regarding Mammo gram retake, call to discuss, has questions.    Please call an advise  Thank you

## 2020-06-30 NOTE — TELEPHONE ENCOUNTER
Pt requesting 3D mammo order and urine culture to check for UTI. Doesn't want abx at this time. She is having back pain.

## 2020-08-27 ENCOUNTER — TELEPHONE (OUTPATIENT)
Dept: INTERNAL MEDICINE | Facility: CLINIC | Age: 70
End: 2020-08-27

## 2020-08-27 NOTE — TELEPHONE ENCOUNTER
----- Message from Emeli Mckeon sent at 8/27/2020  1:38 PM CDT -----  Regarding: lab order  Contact: [facility] [phone]  Patient is in need of lab work and would like to be scheduled for test tomorrow or Friday

## 2020-08-27 NOTE — TELEPHONE ENCOUNTER
Pt returned call, she was requesting lab for blood count/ iron levels. Pt is followed by Hem/ Onc and has standing orders from [facility]. Scheduled those for tomorrow per pt request.

## 2020-08-28 ENCOUNTER — TELEPHONE (OUTPATIENT)
Dept: INTERNAL MEDICINE | Facility: CLINIC | Age: 70
End: 2020-08-28

## 2020-08-28 ENCOUNTER — OFFICE VISIT (OUTPATIENT)
Dept: HEMATOLOGY/ONCOLOGY | Facility: CLINIC | Age: 70
End: 2020-08-28
Payer: MEDICARE

## 2020-08-28 ENCOUNTER — LAB VISIT (OUTPATIENT)
Dept: LAB | Facility: HOSPITAL | Age: 70
End: 2020-08-28
Payer: MEDICARE

## 2020-08-28 VITALS
SYSTOLIC BLOOD PRESSURE: 147 MMHG | TEMPERATURE: 98 F | WEIGHT: 276.88 LBS | HEART RATE: 83 BPM | DIASTOLIC BLOOD PRESSURE: 66 MMHG | BODY MASS INDEX: 44.5 KG/M2 | HEIGHT: 66 IN

## 2020-08-28 DIAGNOSIS — R09.82 POSTNASAL DRIP: ICD-10-CM

## 2020-08-28 DIAGNOSIS — D50.8 OTHER IRON DEFICIENCY ANEMIA: Primary | ICD-10-CM

## 2020-08-28 DIAGNOSIS — D64.9 ANEMIA, UNSPECIFIED TYPE: Primary | ICD-10-CM

## 2020-08-28 DIAGNOSIS — D50.9 IRON DEFICIENCY ANEMIA, UNSPECIFIED IRON DEFICIENCY ANEMIA TYPE: ICD-10-CM

## 2020-08-28 DIAGNOSIS — F41.9 ANXIETY: ICD-10-CM

## 2020-08-28 DIAGNOSIS — D64.9 ANEMIA, UNSPECIFIED TYPE: ICD-10-CM

## 2020-08-28 DIAGNOSIS — E55.9 VITAMIN D DEFICIENCY: ICD-10-CM

## 2020-08-28 LAB
ALBUMIN SERPL BCP-MCNC: 3.5 G/DL (ref 3.5–5.2)
ALP SERPL-CCNC: 85 U/L (ref 55–135)
ALT SERPL W/O P-5'-P-CCNC: 12 U/L (ref 10–44)
ANION GAP SERPL CALC-SCNC: 6 MMOL/L (ref 8–16)
AST SERPL-CCNC: 14 U/L (ref 10–40)
BASOPHILS # BLD AUTO: 0.03 K/UL (ref 0–0.2)
BASOPHILS NFR BLD: 0.5 % (ref 0–1.9)
BILIRUB SERPL-MCNC: 0.8 MG/DL (ref 0.1–1)
BUN SERPL-MCNC: 17 MG/DL (ref 8–23)
CALCIUM SERPL-MCNC: 9.9 MG/DL (ref 8.7–10.5)
CHLORIDE SERPL-SCNC: 110 MMOL/L (ref 95–110)
CO2 SERPL-SCNC: 27 MMOL/L (ref 23–29)
CREAT SERPL-MCNC: 0.8 MG/DL (ref 0.5–1.4)
DIFFERENTIAL METHOD: ABNORMAL
EOSINOPHIL # BLD AUTO: 0.1 K/UL (ref 0–0.5)
EOSINOPHIL NFR BLD: 2.4 % (ref 0–8)
ERYTHROCYTE [DISTWIDTH] IN BLOOD BY AUTOMATED COUNT: 14.4 % (ref 11.5–14.5)
EST. GFR  (AFRICAN AMERICAN): >60 ML/MIN/1.73 M^2
EST. GFR  (NON AFRICAN AMERICAN): >60 ML/MIN/1.73 M^2
FERRITIN SERPL-MCNC: 52 NG/ML (ref 20–300)
GLUCOSE SERPL-MCNC: 100 MG/DL (ref 70–110)
HCT VFR BLD AUTO: 43 % (ref 37–48.5)
HGB BLD-MCNC: 13.6 G/DL (ref 12–16)
IMM GRANULOCYTES # BLD AUTO: 0.02 K/UL (ref 0–0.04)
IMM GRANULOCYTES NFR BLD AUTO: 0.4 % (ref 0–0.5)
IRON SERPL-MCNC: 85 UG/DL (ref 30–160)
LDH SERPL L TO P-CCNC: 143 U/L (ref 110–260)
LYMPHOCYTES # BLD AUTO: 1.5 K/UL (ref 1–4.8)
LYMPHOCYTES NFR BLD: 26.6 % (ref 18–48)
MCH RBC QN AUTO: 30.7 PG (ref 27–31)
MCHC RBC AUTO-ENTMCNC: 31.6 G/DL (ref 32–36)
MCV RBC AUTO: 97 FL (ref 82–98)
MONOCYTES # BLD AUTO: 0.5 K/UL (ref 0.3–1)
MONOCYTES NFR BLD: 8.2 % (ref 4–15)
NEUTROPHILS # BLD AUTO: 3.4 K/UL (ref 1.8–7.7)
NEUTROPHILS NFR BLD: 61.9 % (ref 38–73)
NRBC BLD-RTO: 0 /100 WBC
PLATELET # BLD AUTO: 210 K/UL (ref 150–350)
PMV BLD AUTO: 12.2 FL (ref 9.2–12.9)
POTASSIUM SERPL-SCNC: 4.2 MMOL/L (ref 3.5–5.1)
PROT SERPL-MCNC: 6.9 G/DL (ref 6–8.4)
RBC # BLD AUTO: 4.43 M/UL (ref 4–5.4)
SATURATED IRON: 28 % (ref 20–50)
SODIUM SERPL-SCNC: 143 MMOL/L (ref 136–145)
TOTAL IRON BINDING CAPACITY: 306 UG/DL (ref 250–450)
TRANSFERRIN SERPL-MCNC: 207 MG/DL (ref 200–375)
WBC # BLD AUTO: 5.49 K/UL (ref 3.9–12.7)

## 2020-08-28 PROCEDURE — 99999 PR PBB SHADOW E&M-EST. PATIENT-LVL III: ICD-10-PCS | Mod: PBBFAC,,, | Performed by: NURSE PRACTITIONER

## 2020-08-28 PROCEDURE — 1101F PT FALLS ASSESS-DOCD LE1/YR: CPT | Mod: CPTII,S$GLB,, | Performed by: NURSE PRACTITIONER

## 2020-08-28 PROCEDURE — 3008F BODY MASS INDEX DOCD: CPT | Mod: CPTII,S$GLB,, | Performed by: NURSE PRACTITIONER

## 2020-08-28 PROCEDURE — 36415 COLL VENOUS BLD VENIPUNCTURE: CPT

## 2020-08-28 PROCEDURE — 3078F PR MOST RECENT DIASTOLIC BLOOD PRESSURE < 80 MM HG: ICD-10-PCS | Mod: CPTII,S$GLB,, | Performed by: NURSE PRACTITIONER

## 2020-08-28 PROCEDURE — 3008F PR BODY MASS INDEX (BMI) DOCUMENTED: ICD-10-PCS | Mod: CPTII,S$GLB,, | Performed by: NURSE PRACTITIONER

## 2020-08-28 PROCEDURE — 3078F DIAST BP <80 MM HG: CPT | Mod: CPTII,S$GLB,, | Performed by: NURSE PRACTITIONER

## 2020-08-28 PROCEDURE — 80053 COMPREHEN METABOLIC PANEL: CPT

## 2020-08-28 PROCEDURE — 3077F PR MOST RECENT SYSTOLIC BLOOD PRESSURE >= 140 MM HG: ICD-10-PCS | Mod: CPTII,S$GLB,, | Performed by: NURSE PRACTITIONER

## 2020-08-28 PROCEDURE — 1101F PR PT FALLS ASSESS DOC 0-1 FALLS W/OUT INJ PAST YR: ICD-10-PCS | Mod: CPTII,S$GLB,, | Performed by: NURSE PRACTITIONER

## 2020-08-28 PROCEDURE — 1159F PR MEDICATION LIST DOCUMENTED IN MEDICAL RECORD: ICD-10-PCS | Mod: S$GLB,,, | Performed by: NURSE PRACTITIONER

## 2020-08-28 PROCEDURE — 85025 COMPLETE CBC W/AUTO DIFF WBC: CPT

## 2020-08-28 PROCEDURE — 1159F MED LIST DOCD IN RCRD: CPT | Mod: S$GLB,,, | Performed by: NURSE PRACTITIONER

## 2020-08-28 PROCEDURE — 1125F AMNT PAIN NOTED PAIN PRSNT: CPT | Mod: S$GLB,,, | Performed by: NURSE PRACTITIONER

## 2020-08-28 PROCEDURE — 99999 PR PBB SHADOW E&M-EST. PATIENT-LVL III: CPT | Mod: PBBFAC,,, | Performed by: NURSE PRACTITIONER

## 2020-08-28 PROCEDURE — 83615 LACTATE (LD) (LDH) ENZYME: CPT

## 2020-08-28 PROCEDURE — 83540 ASSAY OF IRON: CPT

## 2020-08-28 PROCEDURE — 99214 PR OFFICE/OUTPT VISIT, EST, LEVL IV, 30-39 MIN: ICD-10-PCS | Mod: S$GLB,,, | Performed by: NURSE PRACTITIONER

## 2020-08-28 PROCEDURE — 1125F PR PAIN SEVERITY QUANTIFIED, PAIN PRESENT: ICD-10-PCS | Mod: S$GLB,,, | Performed by: NURSE PRACTITIONER

## 2020-08-28 PROCEDURE — 82728 ASSAY OF FERRITIN: CPT

## 2020-08-28 PROCEDURE — 3077F SYST BP >= 140 MM HG: CPT | Mod: CPTII,S$GLB,, | Performed by: NURSE PRACTITIONER

## 2020-08-28 PROCEDURE — 99214 OFFICE O/P EST MOD 30 MIN: CPT | Mod: S$GLB,,, | Performed by: NURSE PRACTITIONER

## 2020-08-28 RX ORDER — FLUTICASONE PROPIONATE 50 MCG
1 SPRAY, SUSPENSION (ML) NASAL DAILY
Qty: 16 G | Refills: 11 | Status: SHIPPED | OUTPATIENT
Start: 2020-08-28 | End: 2021-05-04 | Stop reason: SDUPTHER

## 2020-08-28 RX ORDER — FLUOXETINE 10 MG/1
CAPSULE ORAL
Qty: 180 CAPSULE | Refills: 2 | Status: SHIPPED | OUTPATIENT
Start: 2020-08-28 | End: 2021-05-04 | Stop reason: SDUPTHER

## 2020-08-28 RX ORDER — ERGOCALCIFEROL 1.25 MG/1
50000 CAPSULE ORAL
Qty: 12 CAPSULE | Refills: 3 | Status: SHIPPED | OUTPATIENT
Start: 2020-08-28 | End: 2021-05-04 | Stop reason: SDUPTHER

## 2020-08-28 RX ORDER — FERROUS SULFATE 325(65) MG
325 TABLET ORAL 2 TIMES DAILY
Qty: 60 TABLET | Refills: 5 | Status: SHIPPED | OUTPATIENT
Start: 2020-08-28 | End: 2021-05-04 | Stop reason: SDUPTHER
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
## 2020-08-28 NOTE — TELEPHONE ENCOUNTER
----- Message from Israel Lincoln sent at 8/28/2020 12:46 PM CDT -----  Regarding: Advice  Contact: Patient [phone]  Patient would like to get medical advice.    Comments: Patient calling stating the recent labs was suppose to have a Hemoglobin order added, but was not, would like a call back to discuss stating must have checked.    Please call an advise  Thank you

## 2021-01-04 ENCOUNTER — PATIENT MESSAGE (OUTPATIENT)
Dept: ADMINISTRATIVE | Facility: HOSPITAL | Age: 71
End: 2021-01-04

## 2021-04-05 ENCOUNTER — PATIENT MESSAGE (OUTPATIENT)
Dept: ADMINISTRATIVE | Facility: HOSPITAL | Age: 71
End: 2021-04-05

## 2021-04-16 ENCOUNTER — PATIENT MESSAGE (OUTPATIENT)
Dept: RESEARCH | Facility: HOSPITAL | Age: 71
End: 2021-04-16

## 2021-04-26 ENCOUNTER — TELEPHONE (OUTPATIENT)
Dept: INTERNAL MEDICINE | Facility: CLINIC | Age: 71
End: 2021-04-26

## 2021-04-26 DIAGNOSIS — Z82.49 FAMILY HISTORY OF CARDIAC DISORDER: ICD-10-CM

## 2021-04-26 DIAGNOSIS — E66.01 MORBID OBESITY: ICD-10-CM

## 2021-04-26 DIAGNOSIS — D50.8 OTHER IRON DEFICIENCY ANEMIA: ICD-10-CM

## 2021-04-26 DIAGNOSIS — Z00.00 ANNUAL PHYSICAL EXAM: Primary | ICD-10-CM

## 2021-04-26 DIAGNOSIS — R73.03 PRE-DIABETES: ICD-10-CM

## 2021-04-26 DIAGNOSIS — E55.9 VITAMIN D DEFICIENCY: ICD-10-CM

## 2021-04-26 DIAGNOSIS — I10 HYPERTENSION, UNSPECIFIED TYPE: ICD-10-CM

## 2021-04-29 ENCOUNTER — HOSPITAL ENCOUNTER (OUTPATIENT)
Dept: RADIOLOGY | Facility: HOSPITAL | Age: 71
Discharge: HOME | End: 2021-04-29
Attending: INTERNAL MEDICINE
Payer: MEDICARE

## 2021-04-29 DIAGNOSIS — Z12.31 ENCOUNTER FOR SCREENING MAMMOGRAM FOR BREAST CANCER: ICD-10-CM

## 2021-04-29 PROCEDURE — 77067 MAMMO DIGITAL SCREENING BILAT WITH TOMO: ICD-10-PCS | Mod: 26,,, | Performed by: RADIOLOGY

## 2021-04-29 PROCEDURE — 77063 BREAST TOMOSYNTHESIS BI: CPT | Mod: 26,,, | Performed by: RADIOLOGY

## 2021-04-29 PROCEDURE — 77063 MAMMO DIGITAL SCREENING BILAT WITH TOMO: ICD-10-PCS | Mod: 26,,, | Performed by: RADIOLOGY

## 2021-04-29 PROCEDURE — 77067 SCR MAMMO BI INCL CAD: CPT | Mod: TC

## 2021-04-29 PROCEDURE — 77067 SCR MAMMO BI INCL CAD: CPT | Mod: 26,,, | Performed by: RADIOLOGY

## 2021-05-03 ENCOUNTER — PATIENT MESSAGE (OUTPATIENT)
Dept: INTERNAL MEDICINE | Facility: CLINIC | Age: 71
End: 2021-05-03

## 2021-05-04 ENCOUNTER — OFFICE VISIT (OUTPATIENT)
Dept: INTERNAL MEDICINE | Facility: CLINIC | Age: 71
End: 2021-05-04
Payer: MEDICARE

## 2021-05-04 VITALS
BODY MASS INDEX: 45.7 KG/M2 | SYSTOLIC BLOOD PRESSURE: 132 MMHG | DIASTOLIC BLOOD PRESSURE: 70 MMHG | WEIGHT: 284.38 LBS | HEIGHT: 66 IN | RESPIRATION RATE: 18 BRPM

## 2021-05-04 DIAGNOSIS — G47.33 OSA (OBSTRUCTIVE SLEEP APNEA): ICD-10-CM

## 2021-05-04 DIAGNOSIS — Z82.49 FAMILY HISTORY OF CARDIAC DISORDER: ICD-10-CM

## 2021-05-04 DIAGNOSIS — I10 ESSENTIAL HYPERTENSION: ICD-10-CM

## 2021-05-04 DIAGNOSIS — E66.01 MORBID OBESITY: ICD-10-CM

## 2021-05-04 DIAGNOSIS — J30.2 SEASONAL ALLERGIES: ICD-10-CM

## 2021-05-04 DIAGNOSIS — M79.671 RIGHT FOOT PAIN: ICD-10-CM

## 2021-05-04 DIAGNOSIS — Z12.4 SCREENING FOR CERVICAL CANCER: ICD-10-CM

## 2021-05-04 DIAGNOSIS — Z13.5 SCREENING FOR EYE CONDITION: Primary | ICD-10-CM

## 2021-05-04 DIAGNOSIS — Z00.00 ANNUAL PHYSICAL EXAM: ICD-10-CM

## 2021-05-04 DIAGNOSIS — F41.9 ANXIETY: ICD-10-CM

## 2021-05-04 DIAGNOSIS — E55.9 VITAMIN D DEFICIENCY: ICD-10-CM

## 2021-05-04 DIAGNOSIS — D50.8 OTHER IRON DEFICIENCY ANEMIA: ICD-10-CM

## 2021-05-04 DIAGNOSIS — R73.03 PRE-DIABETES: ICD-10-CM

## 2021-05-04 PROCEDURE — 99214 OFFICE O/P EST MOD 30 MIN: CPT | Mod: S$GLB,,, | Performed by: NURSE PRACTITIONER

## 2021-05-04 PROCEDURE — 3008F PR BODY MASS INDEX (BMI) DOCUMENTED: ICD-10-PCS | Mod: CPTII,S$GLB,, | Performed by: NURSE PRACTITIONER

## 2021-05-04 PROCEDURE — 1125F PR PAIN SEVERITY QUANTIFIED, PAIN PRESENT: ICD-10-PCS | Mod: S$GLB,,, | Performed by: NURSE PRACTITIONER

## 2021-05-04 PROCEDURE — 99214 PR OFFICE/OUTPT VISIT, EST, LEVL IV, 30-39 MIN: ICD-10-PCS | Mod: S$GLB,,, | Performed by: NURSE PRACTITIONER

## 2021-05-04 PROCEDURE — 99999 PR PBB SHADOW E&M-EST. PATIENT-LVL IV: ICD-10-PCS | Mod: PBBFAC,,, | Performed by: NURSE PRACTITIONER

## 2021-05-04 PROCEDURE — 1101F PR PT FALLS ASSESS DOC 0-1 FALLS W/OUT INJ PAST YR: ICD-10-PCS | Mod: CPTII,S$GLB,, | Performed by: NURSE PRACTITIONER

## 2021-05-04 PROCEDURE — 99999 PR PBB SHADOW E&M-EST. PATIENT-LVL IV: CPT | Mod: PBBFAC,,, | Performed by: NURSE PRACTITIONER

## 2021-05-04 PROCEDURE — 1125F AMNT PAIN NOTED PAIN PRSNT: CPT | Mod: S$GLB,,, | Performed by: NURSE PRACTITIONER

## 2021-05-04 PROCEDURE — 3288F FALL RISK ASSESSMENT DOCD: CPT | Mod: CPTII,S$GLB,, | Performed by: NURSE PRACTITIONER

## 2021-05-04 PROCEDURE — 1101F PT FALLS ASSESS-DOCD LE1/YR: CPT | Mod: CPTII,S$GLB,, | Performed by: NURSE PRACTITIONER

## 2021-05-04 PROCEDURE — 3008F BODY MASS INDEX DOCD: CPT | Mod: CPTII,S$GLB,, | Performed by: NURSE PRACTITIONER

## 2021-05-04 PROCEDURE — 3288F PR FALLS RISK ASSESSMENT DOCUMENTED: ICD-10-PCS | Mod: CPTII,S$GLB,, | Performed by: NURSE PRACTITIONER

## 2021-05-04 RX ORDER — FLUOXETINE 10 MG/1
CAPSULE ORAL
Qty: 180 CAPSULE | Refills: 1 | Status: SHIPPED | OUTPATIENT
Start: 2021-05-04 | End: 2023-08-08

## 2021-05-04 RX ORDER — FERROUS SULFATE 325(65) MG
325 TABLET ORAL 2 TIMES DAILY
Qty: 180 TABLET | Refills: 3 | Status: SHIPPED | OUTPATIENT
Start: 2021-05-04 | End: 2021-10-19

## 2021-05-04 RX ORDER — FLUTICASONE PROPIONATE 50 MCG
1 SPRAY, SUSPENSION (ML) NASAL DAILY
Qty: 16 G | Refills: 11 | Status: SHIPPED | OUTPATIENT
Start: 2021-05-04 | End: 2022-10-04 | Stop reason: SDUPTHER

## 2021-05-04 RX ORDER — OLMESARTAN MEDOXOMIL AND HYDROCHLOROTHIAZIDE 40/12.5 40; 12.5 MG/1; MG/1
TABLET ORAL
Qty: 90 TABLET | Refills: 3 | Status: SHIPPED | OUTPATIENT
Start: 2021-05-04 | End: 2021-10-19

## 2021-05-04 RX ORDER — ERGOCALCIFEROL 1.25 MG/1
50000 CAPSULE ORAL
Qty: 12 CAPSULE | Refills: 3 | Status: SHIPPED | OUTPATIENT
Start: 2021-05-04 | End: 2021-10-19

## 2021-05-04 RX ORDER — MONTELUKAST SODIUM 10 MG/1
10 TABLET ORAL NIGHTLY
Qty: 90 TABLET | Refills: 3 | Status: SHIPPED | OUTPATIENT
Start: 2021-05-04 | End: 2021-10-19

## 2021-05-11 ENCOUNTER — TELEPHONE (OUTPATIENT)
Dept: SLEEP MEDICINE | Facility: CLINIC | Age: 71
End: 2021-05-11

## 2021-06-23 ENCOUNTER — OFFICE VISIT (OUTPATIENT)
Dept: OBSTETRICS AND GYNECOLOGY | Facility: CLINIC | Age: 71
End: 2021-06-23
Payer: MEDICARE

## 2021-06-23 VITALS
SYSTOLIC BLOOD PRESSURE: 170 MMHG | BODY MASS INDEX: 46.18 KG/M2 | DIASTOLIC BLOOD PRESSURE: 98 MMHG | WEIGHT: 286.13 LBS

## 2021-06-23 DIAGNOSIS — Z01.419 VISIT FOR GYNECOLOGIC EXAMINATION: Primary | ICD-10-CM

## 2021-06-23 DIAGNOSIS — N95.9 MENOPAUSAL AND PERIMENOPAUSAL DISORDER: ICD-10-CM

## 2021-06-23 DIAGNOSIS — Z12.4 SCREENING FOR CERVICAL CANCER: ICD-10-CM

## 2021-06-23 PROCEDURE — 3288F PR FALLS RISK ASSESSMENT DOCUMENTED: ICD-10-PCS | Mod: CPTII,S$GLB,, | Performed by: OBSTETRICS & GYNECOLOGY

## 2021-06-23 PROCEDURE — G0101 CA SCREEN;PELVIC/BREAST EXAM: HCPCS | Mod: S$GLB,,, | Performed by: OBSTETRICS & GYNECOLOGY

## 2021-06-23 PROCEDURE — 3008F PR BODY MASS INDEX (BMI) DOCUMENTED: ICD-10-PCS | Mod: CPTII,S$GLB,, | Performed by: OBSTETRICS & GYNECOLOGY

## 2021-06-23 PROCEDURE — 3288F FALL RISK ASSESSMENT DOCD: CPT | Mod: CPTII,S$GLB,, | Performed by: OBSTETRICS & GYNECOLOGY

## 2021-06-23 PROCEDURE — 1101F PT FALLS ASSESS-DOCD LE1/YR: CPT | Mod: CPTII,S$GLB,, | Performed by: OBSTETRICS & GYNECOLOGY

## 2021-06-23 PROCEDURE — 1159F MED LIST DOCD IN RCRD: CPT | Mod: S$GLB,,, | Performed by: OBSTETRICS & GYNECOLOGY

## 2021-06-23 PROCEDURE — 3008F BODY MASS INDEX DOCD: CPT | Mod: CPTII,S$GLB,, | Performed by: OBSTETRICS & GYNECOLOGY

## 2021-06-23 PROCEDURE — 1159F PR MEDICATION LIST DOCUMENTED IN MEDICAL RECORD: ICD-10-PCS | Mod: S$GLB,,, | Performed by: OBSTETRICS & GYNECOLOGY

## 2021-06-23 PROCEDURE — 99999 PR PBB SHADOW E&M-EST. PATIENT-LVL III: ICD-10-PCS | Mod: PBBFAC,,, | Performed by: OBSTETRICS & GYNECOLOGY

## 2021-06-23 PROCEDURE — G0101 PR CA SCREEN;PELVIC/BREAST EXAM: ICD-10-PCS | Mod: S$GLB,,, | Performed by: OBSTETRICS & GYNECOLOGY

## 2021-06-23 PROCEDURE — 1101F PR PT FALLS ASSESS DOC 0-1 FALLS W/OUT INJ PAST YR: ICD-10-PCS | Mod: CPTII,S$GLB,, | Performed by: OBSTETRICS & GYNECOLOGY

## 2021-06-23 PROCEDURE — 99999 PR PBB SHADOW E&M-EST. PATIENT-LVL III: CPT | Mod: PBBFAC,,, | Performed by: OBSTETRICS & GYNECOLOGY

## 2021-06-24 ENCOUNTER — PATIENT OUTREACH (OUTPATIENT)
Dept: ADMINISTRATIVE | Facility: OTHER | Age: 71
End: 2021-06-24

## 2021-10-11 DIAGNOSIS — D50.8 OTHER IRON DEFICIENCY ANEMIA: Primary | ICD-10-CM

## 2021-10-11 DIAGNOSIS — E55.9 VITAMIN D DEFICIENCY: ICD-10-CM

## 2021-10-12 ENCOUNTER — OFFICE VISIT (OUTPATIENT)
Dept: PODIATRY | Facility: CLINIC | Age: 71
End: 2021-10-12
Payer: MEDICARE

## 2021-10-12 VITALS
SYSTOLIC BLOOD PRESSURE: 150 MMHG | DIASTOLIC BLOOD PRESSURE: 67 MMHG | HEIGHT: 66 IN | WEIGHT: 270 LBS | BODY MASS INDEX: 43.39 KG/M2 | HEART RATE: 83 BPM

## 2021-10-12 DIAGNOSIS — M79.673 PAIN OF FOOT, UNSPECIFIED LATERALITY: Primary | ICD-10-CM

## 2021-10-12 PROCEDURE — 1159F MED LIST DOCD IN RCRD: CPT | Mod: CPTII,S$GLB,, | Performed by: PODIATRIST

## 2021-10-12 PROCEDURE — 1125F PR PAIN SEVERITY QUANTIFIED, PAIN PRESENT: ICD-10-PCS | Mod: CPTII,S$GLB,, | Performed by: PODIATRIST

## 2021-10-12 PROCEDURE — 1159F PR MEDICATION LIST DOCUMENTED IN MEDICAL RECORD: ICD-10-PCS | Mod: CPTII,S$GLB,, | Performed by: PODIATRIST

## 2021-10-12 PROCEDURE — 3288F FALL RISK ASSESSMENT DOCD: CPT | Mod: CPTII,S$GLB,, | Performed by: PODIATRIST

## 2021-10-12 PROCEDURE — 3008F BODY MASS INDEX DOCD: CPT | Mod: CPTII,S$GLB,, | Performed by: PODIATRIST

## 2021-10-12 PROCEDURE — 99499 NO LOS: ICD-10-PCS | Mod: S$GLB,,, | Performed by: PODIATRIST

## 2021-10-12 PROCEDURE — 3077F SYST BP >= 140 MM HG: CPT | Mod: CPTII,S$GLB,, | Performed by: PODIATRIST

## 2021-10-12 PROCEDURE — 3077F PR MOST RECENT SYSTOLIC BLOOD PRESSURE >= 140 MM HG: ICD-10-PCS | Mod: CPTII,S$GLB,, | Performed by: PODIATRIST

## 2021-10-12 PROCEDURE — 3078F DIAST BP <80 MM HG: CPT | Mod: CPTII,S$GLB,, | Performed by: PODIATRIST

## 2021-10-12 PROCEDURE — 3044F PR MOST RECENT HEMOGLOBIN A1C LEVEL <7.0%: ICD-10-PCS | Mod: CPTII,S$GLB,, | Performed by: PODIATRIST

## 2021-10-12 PROCEDURE — 1101F PR PT FALLS ASSESS DOC 0-1 FALLS W/OUT INJ PAST YR: ICD-10-PCS | Mod: CPTII,S$GLB,, | Performed by: PODIATRIST

## 2021-10-12 PROCEDURE — 1125F AMNT PAIN NOTED PAIN PRSNT: CPT | Mod: CPTII,S$GLB,, | Performed by: PODIATRIST

## 2021-10-12 PROCEDURE — 3288F PR FALLS RISK ASSESSMENT DOCUMENTED: ICD-10-PCS | Mod: CPTII,S$GLB,, | Performed by: PODIATRIST

## 2021-10-12 PROCEDURE — 3078F PR MOST RECENT DIASTOLIC BLOOD PRESSURE < 80 MM HG: ICD-10-PCS | Mod: CPTII,S$GLB,, | Performed by: PODIATRIST

## 2021-10-12 PROCEDURE — 3008F PR BODY MASS INDEX (BMI) DOCUMENTED: ICD-10-PCS | Mod: CPTII,S$GLB,, | Performed by: PODIATRIST

## 2021-10-12 PROCEDURE — 99499 UNLISTED E&M SERVICE: CPT | Mod: S$GLB,,, | Performed by: PODIATRIST

## 2021-10-12 PROCEDURE — 1101F PT FALLS ASSESS-DOCD LE1/YR: CPT | Mod: CPTII,S$GLB,, | Performed by: PODIATRIST

## 2021-10-12 PROCEDURE — 3044F HG A1C LEVEL LT 7.0%: CPT | Mod: CPTII,S$GLB,, | Performed by: PODIATRIST

## 2021-10-14 ENCOUNTER — OFFICE VISIT (OUTPATIENT)
Dept: PODIATRY | Facility: CLINIC | Age: 71
End: 2021-10-14
Payer: MEDICARE

## 2021-10-14 ENCOUNTER — APPOINTMENT (OUTPATIENT)
Dept: RADIOLOGY | Facility: OTHER | Age: 71
End: 2021-10-14
Attending: PODIATRIST
Payer: MEDICARE

## 2021-10-14 VITALS
DIASTOLIC BLOOD PRESSURE: 64 MMHG | HEIGHT: 66 IN | WEIGHT: 270 LBS | SYSTOLIC BLOOD PRESSURE: 140 MMHG | BODY MASS INDEX: 43.39 KG/M2 | HEART RATE: 82 BPM

## 2021-10-14 DIAGNOSIS — M24.573 EQUINUS CONTRACTURE OF ANKLE: ICD-10-CM

## 2021-10-14 DIAGNOSIS — M76.829 PTTD (POSTERIOR TIBIAL TENDON DYSFUNCTION): ICD-10-CM

## 2021-10-14 DIAGNOSIS — M79.673 PAIN OF FOOT, UNSPECIFIED LATERALITY: ICD-10-CM

## 2021-10-14 DIAGNOSIS — M79.673 PAIN OF FOOT, UNSPECIFIED LATERALITY: Primary | ICD-10-CM

## 2021-10-14 DIAGNOSIS — M19.079 ARTHRITIS OF FOOT: ICD-10-CM

## 2021-10-14 PROCEDURE — 1159F PR MEDICATION LIST DOCUMENTED IN MEDICAL RECORD: ICD-10-PCS | Mod: CPTII,S$GLB,, | Performed by: PODIATRIST

## 2021-10-14 PROCEDURE — 3008F PR BODY MASS INDEX (BMI) DOCUMENTED: ICD-10-PCS | Mod: CPTII,S$GLB,, | Performed by: PODIATRIST

## 2021-10-14 PROCEDURE — 73610 X-RAY EXAM OF ANKLE: CPT | Mod: 26,50,, | Performed by: STUDENT IN AN ORGANIZED HEALTH CARE EDUCATION/TRAINING PROGRAM

## 2021-10-14 PROCEDURE — 1101F PR PT FALLS ASSESS DOC 0-1 FALLS W/OUT INJ PAST YR: ICD-10-PCS | Mod: CPTII,S$GLB,, | Performed by: PODIATRIST

## 2021-10-14 PROCEDURE — 1125F PR PAIN SEVERITY QUANTIFIED, PAIN PRESENT: ICD-10-PCS | Mod: CPTII,S$GLB,, | Performed by: PODIATRIST

## 2021-10-14 PROCEDURE — 3288F PR FALLS RISK ASSESSMENT DOCUMENTED: ICD-10-PCS | Mod: CPTII,S$GLB,, | Performed by: PODIATRIST

## 2021-10-14 PROCEDURE — 1125F AMNT PAIN NOTED PAIN PRSNT: CPT | Mod: CPTII,S$GLB,, | Performed by: PODIATRIST

## 2021-10-14 PROCEDURE — 73610 XR ANKLE COMPLETE 3 VIEW BILATERAL: ICD-10-PCS | Mod: 26,50,, | Performed by: STUDENT IN AN ORGANIZED HEALTH CARE EDUCATION/TRAINING PROGRAM

## 2021-10-14 PROCEDURE — 99214 OFFICE O/P EST MOD 30 MIN: CPT | Mod: S$GLB,,, | Performed by: PODIATRIST

## 2021-10-14 PROCEDURE — 99999 PR PBB SHADOW E&M-EST. PATIENT-LVL III: ICD-10-PCS | Mod: PBBFAC,,, | Performed by: PODIATRIST

## 2021-10-14 PROCEDURE — 73610 X-RAY EXAM OF ANKLE: CPT | Mod: TC,50

## 2021-10-14 PROCEDURE — 3044F HG A1C LEVEL LT 7.0%: CPT | Mod: CPTII,S$GLB,, | Performed by: PODIATRIST

## 2021-10-14 PROCEDURE — 99999 PR PBB SHADOW E&M-EST. PATIENT-LVL III: CPT | Mod: PBBFAC,,, | Performed by: PODIATRIST

## 2021-10-14 PROCEDURE — 3078F DIAST BP <80 MM HG: CPT | Mod: CPTII,S$GLB,, | Performed by: PODIATRIST

## 2021-10-14 PROCEDURE — 3044F PR MOST RECENT HEMOGLOBIN A1C LEVEL <7.0%: ICD-10-PCS | Mod: CPTII,S$GLB,, | Performed by: PODIATRIST

## 2021-10-14 PROCEDURE — 1159F MED LIST DOCD IN RCRD: CPT | Mod: CPTII,S$GLB,, | Performed by: PODIATRIST

## 2021-10-14 PROCEDURE — 99214 PR OFFICE/OUTPT VISIT, EST, LEVL IV, 30-39 MIN: ICD-10-PCS | Mod: S$GLB,,, | Performed by: PODIATRIST

## 2021-10-14 PROCEDURE — 3077F SYST BP >= 140 MM HG: CPT | Mod: CPTII,S$GLB,, | Performed by: PODIATRIST

## 2021-10-14 PROCEDURE — 3078F PR MOST RECENT DIASTOLIC BLOOD PRESSURE < 80 MM HG: ICD-10-PCS | Mod: CPTII,S$GLB,, | Performed by: PODIATRIST

## 2021-10-14 PROCEDURE — 3008F BODY MASS INDEX DOCD: CPT | Mod: CPTII,S$GLB,, | Performed by: PODIATRIST

## 2021-10-14 PROCEDURE — 3288F FALL RISK ASSESSMENT DOCD: CPT | Mod: CPTII,S$GLB,, | Performed by: PODIATRIST

## 2021-10-14 PROCEDURE — 1101F PT FALLS ASSESS-DOCD LE1/YR: CPT | Mod: CPTII,S$GLB,, | Performed by: PODIATRIST

## 2021-10-14 PROCEDURE — 3077F PR MOST RECENT SYSTOLIC BLOOD PRESSURE >= 140 MM HG: ICD-10-PCS | Mod: CPTII,S$GLB,, | Performed by: PODIATRIST

## 2021-10-14 RX ORDER — LIDOCAINE HYDROCHLORIDE 20 MG/ML
JELLY TOPICAL
Qty: 30 ML | Refills: 2 | Status: SHIPPED | OUTPATIENT
Start: 2021-10-14 | End: 2023-08-08 | Stop reason: SDUPTHER

## 2021-11-18 ENCOUNTER — OFFICE VISIT (OUTPATIENT)
Dept: PODIATRY | Facility: CLINIC | Age: 71
End: 2021-11-18
Payer: MEDICARE

## 2021-11-18 VITALS
DIASTOLIC BLOOD PRESSURE: 77 MMHG | BODY MASS INDEX: 43.39 KG/M2 | HEART RATE: 89 BPM | WEIGHT: 270 LBS | HEIGHT: 66 IN | SYSTOLIC BLOOD PRESSURE: 164 MMHG

## 2021-11-18 DIAGNOSIS — M19.079 ARTHRITIS OF FOOT: ICD-10-CM

## 2021-11-18 DIAGNOSIS — M24.573 EQUINUS CONTRACTURE OF ANKLE: Primary | ICD-10-CM

## 2021-11-18 DIAGNOSIS — M76.829 PTTD (POSTERIOR TIBIAL TENDON DYSFUNCTION): ICD-10-CM

## 2021-11-18 PROCEDURE — 3288F FALL RISK ASSESSMENT DOCD: CPT | Mod: CPTII,S$GLB,, | Performed by: PODIATRIST

## 2021-11-18 PROCEDURE — 1160F RVW MEDS BY RX/DR IN RCRD: CPT | Mod: CPTII,S$GLB,, | Performed by: PODIATRIST

## 2021-11-18 PROCEDURE — 3078F PR MOST RECENT DIASTOLIC BLOOD PRESSURE < 80 MM HG: ICD-10-PCS | Mod: CPTII,S$GLB,, | Performed by: PODIATRIST

## 2021-11-18 PROCEDURE — 1125F AMNT PAIN NOTED PAIN PRSNT: CPT | Mod: CPTII,S$GLB,, | Performed by: PODIATRIST

## 2021-11-18 PROCEDURE — 1125F PR PAIN SEVERITY QUANTIFIED, PAIN PRESENT: ICD-10-PCS | Mod: CPTII,S$GLB,, | Performed by: PODIATRIST

## 2021-11-18 PROCEDURE — 3044F HG A1C LEVEL LT 7.0%: CPT | Mod: CPTII,S$GLB,, | Performed by: PODIATRIST

## 2021-11-18 PROCEDURE — 3044F PR MOST RECENT HEMOGLOBIN A1C LEVEL <7.0%: ICD-10-PCS | Mod: CPTII,S$GLB,, | Performed by: PODIATRIST

## 2021-11-18 PROCEDURE — 3008F BODY MASS INDEX DOCD: CPT | Mod: CPTII,S$GLB,, | Performed by: PODIATRIST

## 2021-11-18 PROCEDURE — 1159F MED LIST DOCD IN RCRD: CPT | Mod: CPTII,S$GLB,, | Performed by: PODIATRIST

## 2021-11-18 PROCEDURE — 1101F PT FALLS ASSESS-DOCD LE1/YR: CPT | Mod: CPTII,S$GLB,, | Performed by: PODIATRIST

## 2021-11-18 PROCEDURE — 3008F PR BODY MASS INDEX (BMI) DOCUMENTED: ICD-10-PCS | Mod: CPTII,S$GLB,, | Performed by: PODIATRIST

## 2021-11-18 PROCEDURE — 3288F PR FALLS RISK ASSESSMENT DOCUMENTED: ICD-10-PCS | Mod: CPTII,S$GLB,, | Performed by: PODIATRIST

## 2021-11-18 PROCEDURE — 1160F PR REVIEW ALL MEDS BY PRESCRIBER/CLIN PHARMACIST DOCUMENTED: ICD-10-PCS | Mod: CPTII,S$GLB,, | Performed by: PODIATRIST

## 2021-11-18 PROCEDURE — 3077F PR MOST RECENT SYSTOLIC BLOOD PRESSURE >= 140 MM HG: ICD-10-PCS | Mod: CPTII,S$GLB,, | Performed by: PODIATRIST

## 2021-11-18 PROCEDURE — 1101F PR PT FALLS ASSESS DOC 0-1 FALLS W/OUT INJ PAST YR: ICD-10-PCS | Mod: CPTII,S$GLB,, | Performed by: PODIATRIST

## 2021-11-18 PROCEDURE — 99212 OFFICE O/P EST SF 10 MIN: CPT | Mod: S$GLB,,, | Performed by: PODIATRIST

## 2021-11-18 PROCEDURE — 1159F PR MEDICATION LIST DOCUMENTED IN MEDICAL RECORD: ICD-10-PCS | Mod: CPTII,S$GLB,, | Performed by: PODIATRIST

## 2021-11-18 PROCEDURE — 99999 PR PBB SHADOW E&M-EST. PATIENT-LVL III: ICD-10-PCS | Mod: PBBFAC,,, | Performed by: PODIATRIST

## 2021-11-18 PROCEDURE — 3078F DIAST BP <80 MM HG: CPT | Mod: CPTII,S$GLB,, | Performed by: PODIATRIST

## 2021-11-18 PROCEDURE — 3077F SYST BP >= 140 MM HG: CPT | Mod: CPTII,S$GLB,, | Performed by: PODIATRIST

## 2021-11-18 PROCEDURE — 99212 PR OFFICE/OUTPT VISIT, EST, LEVL II, 10-19 MIN: ICD-10-PCS | Mod: S$GLB,,, | Performed by: PODIATRIST

## 2021-11-18 PROCEDURE — 99999 PR PBB SHADOW E&M-EST. PATIENT-LVL III: CPT | Mod: PBBFAC,,, | Performed by: PODIATRIST

## 2021-12-21 ENCOUNTER — OFFICE VISIT (OUTPATIENT)
Dept: FAMILY MEDICINE | Facility: CLINIC | Age: 71
End: 2021-12-21
Payer: MEDICARE

## 2021-12-21 DIAGNOSIS — R73.03 PRE-DIABETES: ICD-10-CM

## 2021-12-21 DIAGNOSIS — I10 HYPERTENSION, UNSPECIFIED TYPE: Chronic | ICD-10-CM

## 2021-12-21 DIAGNOSIS — I87.2 VENOUS STASIS DERMATITIS OF BOTH LOWER EXTREMITIES: Primary | ICD-10-CM

## 2021-12-21 DIAGNOSIS — E66.01 SEVERE OBESITY (BMI >= 40): ICD-10-CM

## 2021-12-21 PROCEDURE — 99214 PR OFFICE/OUTPT VISIT, EST, LEVL IV, 30-39 MIN: ICD-10-PCS | Mod: 95,,, | Performed by: FAMILY MEDICINE

## 2021-12-21 PROCEDURE — 99214 OFFICE O/P EST MOD 30 MIN: CPT | Mod: 95,,, | Performed by: FAMILY MEDICINE

## 2022-01-11 ENCOUNTER — TELEPHONE (OUTPATIENT)
Dept: FAMILY MEDICINE | Facility: CLINIC | Age: 72
End: 2022-01-11
Payer: MEDICARE

## 2022-06-13 ENCOUNTER — PATIENT OUTREACH (OUTPATIENT)
Dept: ADMINISTRATIVE | Facility: HOSPITAL | Age: 72
End: 2022-06-13
Payer: MEDICARE

## 2022-06-13 ENCOUNTER — PATIENT MESSAGE (OUTPATIENT)
Dept: ADMINISTRATIVE | Facility: HOSPITAL | Age: 72
End: 2022-06-13
Payer: MEDICARE

## 2022-07-26 ENCOUNTER — NURSE TRIAGE (OUTPATIENT)
Dept: ADMINISTRATIVE | Facility: CLINIC | Age: 72
End: 2022-07-26
Payer: MEDICARE

## 2022-07-26 NOTE — TELEPHONE ENCOUNTER
July 17 tested positive for covid. Pt is on day 9 of covid. Pt had questions about ending quarantine for covid and her booster shot. Pt instructed on quarantine protocol per cdc guidelines. Pt stated she will call back in 2 weeks to schedule her booster shot bc shes going on another vacation. Pt also wanted to know if she needs to discard her Flonase since she was using it while having covid. Pt stated she was disinfecting it after ever use. Care advice recommend pt receives a call from Md office today. Please call and advise pt.    Reason for Disposition   Caller has NON-URGENT medicine question about med that PCP or specialist prescribed and triager unable to answer question    Protocols used: MEDICATION QUESTION CALL-A-[place]

## 2022-07-27 ENCOUNTER — TELEPHONE (OUTPATIENT)
Dept: OBSTETRICS AND GYNECOLOGY | Facility: CLINIC | Age: 72
End: 2022-07-27
Payer: MEDICARE

## 2022-07-27 NOTE — TELEPHONE ENCOUNTER
----- Message from Ml Lemus sent at 7/26/2022 11:17 AM CDT -----  Type:  Mammogram    Caller is requesting to schedule their annual mammogram appointment.  Order is not listed in EPIC.  Please enter order and contact patient to schedule.  Name of Caller pt   Where would they like the mammogram performed? [facility]  Would the patient rather a call back or a response via [facility]? Call   Best Call Back Number: [phone]  Additional Information:  mammo     0

## 2022-07-28 ENCOUNTER — PATIENT MESSAGE (OUTPATIENT)
Dept: OBSTETRICS AND GYNECOLOGY | Facility: CLINIC | Age: 72
End: 2022-07-28
Payer: MEDICARE

## 2022-07-28 ENCOUNTER — TELEPHONE (OUTPATIENT)
Dept: OBSTETRICS AND GYNECOLOGY | Facility: HOSPITAL | Age: 72
End: 2022-07-28
Payer: MEDICARE

## 2022-07-28 DIAGNOSIS — Z12.31 SCREENING MAMMOGRAM FOR HIGH-RISK PATIENT: Primary | ICD-10-CM

## 2022-07-28 NOTE — TELEPHONE ENCOUNTER
----- Message from Irlanda Jean MA sent at 7/27/2022 10:51 AM CDT -----    Chandni Pratt, this patient will like a order for her mammogram. Thanks,     ----- Message -----  From: Ml Lemus  Sent: 7/26/2022  11:18 AM CDT  To: [facility]    Type:  Mammogram    Caller is requesting to schedule their annual mammogram appointment.  Order is not listed in EPIC.  Please enter order and contact patient to schedule.  Name of Caller pt   Where would they like the mammogram performed? [facility]  Would the patient rather a call back or a response via [facility]? Call   Best Call Back Number: [phone]  Additional Information:  mammo

## 2022-08-02 ENCOUNTER — HOSPITAL ENCOUNTER (OUTPATIENT)
Dept: RADIOLOGY | Facility: OTHER | Age: 72
Discharge: HOME | End: 2022-08-02
Attending: FAMILY MEDICINE
Payer: MEDICARE

## 2022-08-02 DIAGNOSIS — I87.2 VENOUS STASIS DERMATITIS OF BOTH LOWER EXTREMITIES: ICD-10-CM

## 2022-08-02 PROCEDURE — 93970 US LOWER EXTREMITY VEINS BILATERAL INSUFFICIENCY: ICD-10-PCS | Mod: 26,,, | Performed by: RADIOLOGY

## 2022-08-02 PROCEDURE — 93970 EXTREMITY STUDY: CPT | Mod: 26,,, | Performed by: RADIOLOGY

## 2022-08-02 PROCEDURE — 93970 EXTREMITY STUDY: CPT | Mod: TC

## 2022-08-07 PROBLEM — I87.2 VENOUS REFLUX: Status: ACTIVE | Noted: 2022-08-07

## 2022-08-08 ENCOUNTER — PATIENT MESSAGE (OUTPATIENT)
Dept: FAMILY MEDICINE | Facility: CLINIC | Age: 72
End: 2022-08-08
Payer: MEDICARE

## 2022-08-08 DIAGNOSIS — I87.2 VENOUS REFLUX: ICD-10-CM

## 2022-08-08 DIAGNOSIS — E66.01 SEVERE OBESITY (BMI >= 40): Primary | ICD-10-CM

## 2022-08-09 ENCOUNTER — TELEPHONE (OUTPATIENT)
Dept: FAMILY MEDICINE | Facility: CLINIC | Age: 72
End: 2022-08-09
Payer: MEDICARE

## 2022-08-15 ENCOUNTER — TELEPHONE (OUTPATIENT)
Dept: CARDIOLOGY | Facility: CLINIC | Age: 72
End: 2022-08-15
Payer: MEDICARE

## 2022-08-15 NOTE — TELEPHONE ENCOUNTER
Spoke w/pt and daughter to inform appt w/ has been inappropriately scheduled. The referral in chart is placed to , vascular, for venous insufficiency to discuss treatment options. Explained that he does not primarily treat this condition so will not be able to offer alternate options. Pt chose to cancel appointment with  and will schedule with Vascular, given office number.

## 2022-08-17 ENCOUNTER — TELEPHONE (OUTPATIENT)
Dept: CARDIOLOGY | Facility: CLINIC | Age: 72
End: 2022-08-17
Payer: MEDICARE

## 2022-08-17 NOTE — TELEPHONE ENCOUNTER
Lm asking for call back to [facility] appt.      ----- Message from Ena Dukes sent at 8/17/2022  8:45 AM CDT -----  Patient has a referral placed by Dr. Kristine Metcalf, patient's diagnosis is    E66.01 (ICD-10-CM) - Severe obesity (BMI >= 40)  I87.2 (ICD-10-CM) - Venous reflux      Can you please assist in scheduling, thank you

## 2022-09-07 ENCOUNTER — OFFICE VISIT (OUTPATIENT)
Dept: INTERNAL MEDICINE | Facility: CLINIC | Age: 72
End: 2022-09-07
Payer: MEDICARE

## 2022-09-07 VITALS
HEART RATE: 79 BPM | DIASTOLIC BLOOD PRESSURE: 82 MMHG | HEIGHT: 66 IN | OXYGEN SATURATION: 98 % | WEIGHT: 285.94 LBS | BODY MASS INDEX: 45.95 KG/M2 | SYSTOLIC BLOOD PRESSURE: 136 MMHG

## 2022-09-07 DIAGNOSIS — G47.30 SLEEP APNEA, UNSPECIFIED TYPE: ICD-10-CM

## 2022-09-07 DIAGNOSIS — D50.8 OTHER IRON DEFICIENCY ANEMIA: ICD-10-CM

## 2022-09-07 DIAGNOSIS — E55.9 VITAMIN D DEFICIENCY: ICD-10-CM

## 2022-09-07 DIAGNOSIS — Z76.89 ENCOUNTER TO ESTABLISH CARE WITH NEW DOCTOR: Primary | ICD-10-CM

## 2022-09-07 DIAGNOSIS — I10 ESSENTIAL (PRIMARY) HYPERTENSION: ICD-10-CM

## 2022-09-07 DIAGNOSIS — R73.03 PRE-DIABETES: ICD-10-CM

## 2022-09-07 DIAGNOSIS — I10 HYPERTENSION, UNSPECIFIED TYPE: Chronic | ICD-10-CM

## 2022-09-07 PROCEDURE — 3008F BODY MASS INDEX DOCD: CPT | Mod: CPTII,GC,S$GLB, | Performed by: STUDENT IN AN ORGANIZED HEALTH CARE EDUCATION/TRAINING PROGRAM

## 2022-09-07 PROCEDURE — 99213 PR OFFICE/OUTPT VISIT, EST, LEVL III, 20-29 MIN: ICD-10-PCS | Mod: GC,S$GLB,, | Performed by: STUDENT IN AN ORGANIZED HEALTH CARE EDUCATION/TRAINING PROGRAM

## 2022-09-07 PROCEDURE — 3008F PR BODY MASS INDEX (BMI) DOCUMENTED: ICD-10-PCS | Mod: CPTII,GC,S$GLB, | Performed by: STUDENT IN AN ORGANIZED HEALTH CARE EDUCATION/TRAINING PROGRAM

## 2022-09-07 PROCEDURE — 1101F PT FALLS ASSESS-DOCD LE1/YR: CPT | Mod: CPTII,GC,S$GLB, | Performed by: STUDENT IN AN ORGANIZED HEALTH CARE EDUCATION/TRAINING PROGRAM

## 2022-09-07 PROCEDURE — 3288F FALL RISK ASSESSMENT DOCD: CPT | Mod: CPTII,GC,S$GLB, | Performed by: STUDENT IN AN ORGANIZED HEALTH CARE EDUCATION/TRAINING PROGRAM

## 2022-09-07 PROCEDURE — 99999 PR PBB SHADOW E&M-EST. PATIENT-LVL IV: ICD-10-PCS | Mod: PBBFAC,GC,, | Performed by: STUDENT IN AN ORGANIZED HEALTH CARE EDUCATION/TRAINING PROGRAM

## 2022-09-07 PROCEDURE — 3079F PR MOST RECENT DIASTOLIC BLOOD PRESSURE 80-89 MM HG: ICD-10-PCS | Mod: CPTII,GC,S$GLB, | Performed by: STUDENT IN AN ORGANIZED HEALTH CARE EDUCATION/TRAINING PROGRAM

## 2022-09-07 PROCEDURE — 99999 PR PBB SHADOW E&M-EST. PATIENT-LVL IV: CPT | Mod: PBBFAC,GC,, | Performed by: STUDENT IN AN ORGANIZED HEALTH CARE EDUCATION/TRAINING PROGRAM

## 2022-09-07 PROCEDURE — 1101F PR PT FALLS ASSESS DOC 0-1 FALLS W/OUT INJ PAST YR: ICD-10-PCS | Mod: CPTII,GC,S$GLB, | Performed by: STUDENT IN AN ORGANIZED HEALTH CARE EDUCATION/TRAINING PROGRAM

## 2022-09-07 PROCEDURE — 1126F PR PAIN SEVERITY QUANTIFIED, NO PAIN PRESENT: ICD-10-PCS | Mod: CPTII,GC,S$GLB, | Performed by: STUDENT IN AN ORGANIZED HEALTH CARE EDUCATION/TRAINING PROGRAM

## 2022-09-07 PROCEDURE — 3079F DIAST BP 80-89 MM HG: CPT | Mod: CPTII,GC,S$GLB, | Performed by: STUDENT IN AN ORGANIZED HEALTH CARE EDUCATION/TRAINING PROGRAM

## 2022-09-07 PROCEDURE — 3075F PR MOST RECENT SYSTOLIC BLOOD PRESS GE 130-139MM HG: ICD-10-PCS | Mod: CPTII,GC,S$GLB, | Performed by: STUDENT IN AN ORGANIZED HEALTH CARE EDUCATION/TRAINING PROGRAM

## 2022-09-07 PROCEDURE — 3288F PR FALLS RISK ASSESSMENT DOCUMENTED: ICD-10-PCS | Mod: CPTII,GC,S$GLB, | Performed by: STUDENT IN AN ORGANIZED HEALTH CARE EDUCATION/TRAINING PROGRAM

## 2022-09-07 PROCEDURE — 1126F AMNT PAIN NOTED NONE PRSNT: CPT | Mod: CPTII,GC,S$GLB, | Performed by: STUDENT IN AN ORGANIZED HEALTH CARE EDUCATION/TRAINING PROGRAM

## 2022-09-07 PROCEDURE — 3075F SYST BP GE 130 - 139MM HG: CPT | Mod: CPTII,GC,S$GLB, | Performed by: STUDENT IN AN ORGANIZED HEALTH CARE EDUCATION/TRAINING PROGRAM

## 2022-09-07 PROCEDURE — 99213 OFFICE O/P EST LOW 20 MIN: CPT | Mod: GC,S$GLB,, | Performed by: STUDENT IN AN ORGANIZED HEALTH CARE EDUCATION/TRAINING PROGRAM

## 2022-09-07 NOTE — PATIENT INSTRUCTIONS
You were seen today to establish care with a new doctor     After today's visit  Continue to work on diet and exercise (aim for 30 min of aerobic activity at least 3 x week)  Keep Blood pressure journal. Bring readings back to next visit in 6 month  Take 2 hours after taking BP med and again sometime in the evening   Keep your appointment for vascular medicine and sleep medicine   Get labs drawn

## 2022-09-07 NOTE — PROGRESS NOTES
Clinic Note  9/7/2022      Subjective:       Patient ID:  Amanda is a 72 y.o. female being seen for an established visit.    Chief Complaint: Establish Care    Ms. James is a 72 F with PMHx of HTN, venous insufficency, LIANE, prediabetes, Obesity, IZABEL who presents to clinic to establish care with a new doctor. Previously followed by Hilary Taveras DO. Patient has no complaints on today's visit aside from leg swelling for which she has an upcoming appointment with vascular medicine (Dr. Lang) on 9/20/22.     HTN  Meds: olmesartan-HCTZ 40-12.5mg qd  Does not record home BP measurements   Does not exercise, inconsistent diet     LIANE  Well managed with PO iron 325mg qd  Hospitalization in 2019 with acute drop in CBC; EDG and C scope negative for source of bleeding. Stable since that time.   C scope revealed polyp with negative pathology - next due in 2026    IZABEL  Not compliant with CPAP due to machine malfunction.   Does not follow with sleep medicine     PreDM  Last Hgb 6.1     Venous Insuffiencey   LE US showed b/l reflux   Complains of LE swelling over several months     Obesity   Inconsistent diet  Does not exercise         Review of Systems   Constitutional:  Negative for chills, diaphoresis, fever, malaise/fatigue and weight loss.   HENT:  Negative for sore throat.    Eyes:  Negative for blurred vision.   Respiratory:  Negative for cough and shortness of breath.    Cardiovascular:  Positive for leg swelling. Negative for chest pain, palpitations and PND.   Gastrointestinal:  Negative for abdominal pain, constipation, diarrhea, heartburn, nausea and vomiting.   Genitourinary:  Negative for dysuria.   Musculoskeletal:  Negative for joint pain and myalgias.   Neurological:  Negative for tingling, weakness and headaches.   Endo/Heme/Allergies:  Negative for environmental allergies.   Psychiatric/Behavioral:  Negative for depression. The patient does not have insomnia.      Past Medical History:   Diagnosis Date  "   Asthma     Hyperlipidemia     Hypertension     IZABEL (obstructive sleep apnea)     Pre-diabetes        Family History   Problem Relation Age of Onset    Hypertension Mother     Coronary artery disease Father     Coronary artery disease Brother     Breast cancer Paternal Aunt     Colon cancer Neg Hx     Inflammatory bowel disease Neg Hx     Crohn's disease Neg Hx     Ulcerative colitis Neg Hx         reports that she quit smoking about 23 years ago. She has never used smokeless tobacco. She reports current alcohol use. She reports that she does not use drugs.    Medication List with Changes/Refills   Current Medications    CHOLECALCIFEROL, VITAMIN D3, 50,000 UNIT CAPSULE    TK 1 C PO WEEKLY    ERGOCALCIFEROL (ERGOCALCIFEROL) 50,000 UNIT CAP    Take 1 capsule (50,000 Units total) by mouth every 7 days.    FERROUS SULFATE (FEROSUL) 325 MG (65 MG IRON) TAB TABLET    Take 1 tablet (325 mg total) by mouth once daily.    FLUOXETINE 10 MG CAPSULE    TAKE 2 CAPSULES(20 MG) BY MOUTH EVERY DAY    FLUTICASONE PROPIONATE (FLONASE) 50 MCG/ACTUATION NASAL SPRAY    1 spray (50 mcg total) by Each Nostril route once daily.    LIDOCAINE HCL 2% (XYLOCAINE) 2 % JELLY    Apply topically as needed. Apply topically once nightly to affected part of foot/feet.    MONTELUKAST (SINGULAIR) 10 MG TABLET    Take 1 tablet (10 mg total) by mouth once daily.    MULTIVIT-MIN-IRON-FA-LUTEIN (CENTRUM SILVER WOMEN) 8 MG IRON-400 MCG-300 MCG TAB    Take 1 tablet by mouth once daily.    OLMESARTAN-HYDROCHLOROTHIAZIDE (BENICAR HCT) 40-12.5 MG TAB    Take one tab po daily     Review of patient's allergies indicates:   Allergen Reactions    Aspirin Other (See Comments)       Patient Active Problem List   Diagnosis    HTN (hypertension)    Pre-diabetes    Severe obesity (BMI >= 40)    Vitamin D deficiency    Iron deficiency anemia    History of gastrointestinal ulcer    Anemia    Venous reflux           Objective:      /82   Pulse 79   Ht 5' 6" " "(1.676 m)   Wt 129.7 kg (285 lb 15 oz)   LMP  (LMP Unknown)   SpO2 98%   BMI 46.15 kg/m²   Estimated body mass index is 46.15 kg/m² as calculated from the following:    Height as of this encounter: 5' 6" (1.676 m).    Weight as of this encounter: 129.7 kg (285 lb 15 oz).  Physical Exam  Constitutional:       General: She is not in acute distress.     Appearance: She is obese. She is not diaphoretic.   HENT:      Head: Normocephalic and atraumatic.      Nose: Nose normal.      Mouth/Throat:      Mouth: Mucous membranes are moist.   Eyes:      General: No scleral icterus.     Extraocular Movements: Extraocular movements intact.      Conjunctiva/sclera: Conjunctivae normal.   Neck:      Thyroid: No thyromegaly.   Cardiovascular:      Rate and Rhythm: Normal rate and regular rhythm.      Pulses: Normal pulses.      Heart sounds: Normal heart sounds.   Pulmonary:      Effort: Pulmonary effort is normal.      Breath sounds: Normal breath sounds. No wheezing or rales.   Abdominal:      General: Bowel sounds are normal. There is no distension.      Palpations: Abdomen is soft. There is no mass.      Tenderness: There is no abdominal tenderness.   Musculoskeletal:         General: Normal range of motion.      Cervical back: Normal range of motion.      Right lower leg: Edema present.      Left lower leg: Edema present.   Skin:     General: Skin is warm and dry.      Findings: No rash.   Neurological:      General: No focal deficit present.      Mental Status: She is alert and oriented to person, place, and time.   Psychiatric:         Mood and Affect: Mood and affect normal.         Behavior: Behavior normal.         Thought Content: Thought content normal.         Judgment: Judgment normal.         Assessment and Plan:         Amanda was seen today for establish care.    Diagnoses and all orders for this visit:    Encounter to establish care with new doctor  -     CBC W/ AUTO DIFFERENTIAL; Future  -     COMPREHENSIVE " METABOLIC PANEL; Future  -     Lipid Panel; Future  -     Hemoglobin A1C; Future  -     TSH; Future    Hypertension, unspecified type    Other iron deficiency anemia  -     CBC W/ AUTO DIFFERENTIAL; Future    Pre-diabetes  -     Hemoglobin A1C; Future    Vitamin D deficiency    Sleep apnea, unspecified type  -     TSH; Future  -     Ambulatory referral/consult to Sleep Disorders; Future    Essential (primary) hypertension  -     Lipid Panel; Future  -     Hypertension Digital Medicine (HDMP) Enrollment Order  -     Hypertension Digital Medicine (HDMP): Assign Onboarding Questionnaires      Follow up in about 6 months (around 3/7/2023).    Other Orders Placed This Visit:  Orders Placed This Encounter   Procedures    CBC W/ AUTO DIFFERENTIAL    COMPREHENSIVE METABOLIC PANEL    Lipid Panel    Hemoglobin A1C    TSH    Ambulatory referral/consult to Sleep Disorders    Hypertension Digital Medicine (HDMP) Enrollment Order         Fernandez Marroquin MD  Internal Medicine, PGY-3    Discussed with Dr. Zafar

## 2022-09-15 ENCOUNTER — OFFICE VISIT (OUTPATIENT)
Dept: CARDIOLOGY | Facility: CLINIC | Age: 72
End: 2022-09-15
Payer: MEDICARE

## 2022-09-15 VITALS
HEART RATE: 76 BPM | WEIGHT: 280.19 LBS | HEIGHT: 66 IN | DIASTOLIC BLOOD PRESSURE: 60 MMHG | SYSTOLIC BLOOD PRESSURE: 125 MMHG | BODY MASS INDEX: 45.03 KG/M2

## 2022-09-15 DIAGNOSIS — R60.0 EDEMA OF BOTH LOWER EXTREMITIES: ICD-10-CM

## 2022-09-15 DIAGNOSIS — I87.2 VENOUS REFLUX: ICD-10-CM

## 2022-09-15 DIAGNOSIS — E78.2 MIXED HYPERLIPIDEMIA: ICD-10-CM

## 2022-09-15 DIAGNOSIS — I87.2 VENOUS INSUFFICIENCY OF BOTH LOWER EXTREMITIES: Primary | ICD-10-CM

## 2022-09-15 DIAGNOSIS — E66.01 SEVERE OBESITY (BMI >= 40): ICD-10-CM

## 2022-09-15 DIAGNOSIS — I89.0 LYMPHEDEMA OF BOTH LOWER EXTREMITIES: ICD-10-CM

## 2022-09-15 PROCEDURE — 99205 PR OFFICE/OUTPT VISIT, NEW, LEVL V, 60-74 MIN: ICD-10-PCS | Mod: S$GLB,,, | Performed by: INTERNAL MEDICINE

## 2022-09-15 PROCEDURE — 99999 PR PBB SHADOW E&M-EST. PATIENT-LVL IV: CPT | Mod: PBBFAC,,, | Performed by: INTERNAL MEDICINE

## 2022-09-15 PROCEDURE — 99205 OFFICE O/P NEW HI 60 MIN: CPT | Mod: S$GLB,,, | Performed by: INTERNAL MEDICINE

## 2022-09-15 PROCEDURE — 99214 OFFICE O/P EST MOD 30 MIN: CPT | Mod: PBBFAC | Performed by: INTERNAL MEDICINE

## 2022-09-15 PROCEDURE — 99999 PR PBB SHADOW E&M-EST. PATIENT-LVL IV: ICD-10-PCS | Mod: PBBFAC,,, | Performed by: INTERNAL MEDICINE

## 2022-09-15 NOTE — PROGRESS NOTES
[facility]      Chief Complaint   Patient presents with    Establish Care     Leg leg not pumping blood       Patient ID: Amanda James is a 72 y.o. female with HTN, HLD, IZABEL (not on CPAP), morbid obesity, who presents for an initial appointment.  Pertinent history/events are as follows:    -Pt kindly referred by Dr. Metcalf for evaluation of Venous reflux    HPI:  Mrs. James reports leg swelling for several years.  She hs no claudication or tissue loss.  No smoking history.  BLE Venous Reflux Study on 2022 revealed hemodynamically significant venous reflux bilaterally with no evidence of DVT.     Past Medical History:   Diagnosis Date    Asthma     Hyperlipidemia     Hypertension     IZABEL (obstructive sleep apnea)     Pre-diabetes      Past Surgical History:   Procedure Laterality Date    BREAST BIOPSY Left     benign    BREAST LUMPECTOMY Left     COLONOSCOPY N/A 2019    Procedure: COLONOSCOPY;  Surgeon: Clinton Gottlieb MD;  Location: [facility] ([address] [place] [address]);  Service: Endoscopy;  Laterality: N/A;  BMI 47.45  IZABEL    ESOPHAGOGASTRODUODENOSCOPY N/A 2019    Procedure: EGD (ESOPHAGOGASTRODUODENOSCOPY);  Surgeon: Clinton Gottlieb MD;  Location: [facility] ([address] [place] [address]);  Service: Endoscopy;  Laterality: N/A;  BMI 47.45  IZABEL    HYSTERECTOMY      PARTIAL HYSTERECTOMY      uterus removed     Social History     Socioeconomic History    Marital status: Single   Tobacco Use    Smoking status: Former     Types: Cigarettes     Quit date: 1999     Years since quittin.3    Smokeless tobacco: Never   Substance and Sexual Activity    Alcohol use: Yes     Comment: socially/ rare    Drug use: No    Sexual activity: Not Currently     Family History   Problem Relation Age of Onset    Hypertension Mother     Coronary artery disease Father     Coronary artery disease Brother     Breast cancer Paternal Aunt     Colon cancer Neg Hx     Inflammatory bowel disease Neg Hx     Crohn's disease Neg Hx      "Ulcerative colitis Neg Hx        Review of patient's allergies indicates:   Allergen Reactions    Aspirin Other (See Comments)       Medication List with Changes/Refills   Current Medications    CHOLECALCIFEROL, VITAMIN D3, 50,000 UNIT CAPSULE    TK 1 C PO WEEKLY    ERGOCALCIFEROL (ERGOCALCIFEROL) 50,000 UNIT CAP    Take 1 capsule (50,000 Units total) by mouth every 7 days.    FERROUS SULFATE (FEROSUL) 325 MG (65 MG IRON) TAB TABLET    Take 1 tablet (325 mg total) by mouth once daily.    FLUOXETINE 10 MG CAPSULE    TAKE 2 CAPSULES(20 MG) BY MOUTH EVERY DAY    FLUTICASONE PROPIONATE (FLONASE) 50 MCG/ACTUATION NASAL SPRAY    1 spray (50 mcg total) by Each Nostril route once daily.    LIDOCAINE HCL 2% (XYLOCAINE) 2 % JELLY    Apply topically as needed. Apply topically once nightly to affected part of foot/feet.    MONTELUKAST (SINGULAIR) 10 MG TABLET    Take 1 tablet (10 mg total) by mouth once daily.    MULTIVIT-MIN-IRON-FA-LUTEIN (CENTRUM SILVER WOMEN) 8 MG IRON-400 MCG-300 MCG TAB    Take 1 tablet by mouth once daily.    OLMESARTAN-HYDROCHLOROTHIAZIDE (BENICAR HCT) 40-12.5 MG TAB    Take one tab po daily       Review of Systems  Constitution: Denies chills, fever, and sweats.  HENT: Denies headaches or blurry vision.  Cardiovascular: Denies chest pain or irregular heart beat.  Respiratory: Denies cough or shortness of breath.  Gastrointestinal: Denies abdominal pain, nausea, or vomiting.  Musculoskeletal: Positive for leg swelling.  Neurological: Denies dizziness or focal weakness.  Psychiatric/Behavioral: Normal mental status.  Hematologic/Lymphatic: Denies bleeding problem or easy bruising/bleeding.  Skin: Denies rash or suspicious lesions    Physical Examination  /60 (BP Location: Left arm, Patient Position: Sitting, BP Method: Large (Automatic))   Pulse 76   Ht 5' 6" (1.676 m)   Wt 127.1 kg (280 lb 3.3 oz)   LMP  (LMP Unknown)   BMI 45.23 kg/m²     Constitutional: No acute distress, conversant  HEENT: " Sclera anicteric, Pupils equal, round and reactive to light, extraocular motions intact, Oropharynx clear  Neck: No JVD, no carotid bruits  Cardiovascular: regular rate and rhythm, no murmur, rubs or gallops, normal S1/S2  Pulmonary: Clear to auscultation bilaterally  Abdominal: Abdomen soft, nontender, nondistended, positive bowel sounds  Extremities: BLE's with 1+ pitting edema, venous stasis dermatitis, and changes consistent with lymphedema (L>R)  Pulses:  Carotid pulses are 2+ on the right side, and 2+ on the left side.  Radial pulses are 2+ on the right side, and 2+ on the left side.   Femoral pulses are 2+ on the right side, and 2+ on the left side.  Popliteal pulses are 2+ on the right side, and 2+ on the left side.   Dorsalis pedis pulses are 2+ on the right side, and 2+ on the left side.   Posterior tibial pulses are 2+ on the right side, and 2+ on the left side.    Skin: No ecchymosis, erythema, or ulcers  Psych: Alert and oriented x 3, appropriate affect  Neuro: CNII-XII intact, no focal deficits    Labs:  Most Recent Data  CBC:   Lab Results   Component Value Date    WBC 5.60 10/07/2021    HGB 13.4 10/07/2021    HCT 41.9 10/07/2021     10/07/2021    MCV 94 10/07/2021    RDW 15.3 (H) 10/07/2021     BMP:   Lab Results   Component Value Date     10/07/2021    K 4.5 10/07/2021     10/07/2021    CO2 26 10/07/2021    BUN 12 10/07/2021    CREATININE 0.8 10/07/2021     (H) 10/07/2021    CALCIUM 10.0 10/07/2021    MG 1.9 07/27/2019    PHOS 2.8 07/27/2019     LFTS;   Lab Results   Component Value Date    PROT 7.1 10/07/2021    ALBUMIN 3.3 (L) 10/07/2021    BILITOT 0.4 10/07/2021    AST 36 10/07/2021    ALKPHOS 89 10/07/2021    ALT 23 10/07/2021     COAGS: No results found for: INR, PROTIME, PTT  FLP:   Lab Results   Component Value Date    CHOL 235 (H) 04/29/2021    HDL 47 04/29/2021    LDLCALC 170.4 (H) 04/29/2021    TRIG 88 04/29/2021    CHOLHDL 20.0 04/29/2021     CARDIAC:   Lab  Results   Component Value Date    TROPONINI <0.006 10/07/2021       Imaging:    BLE Venous Reflux Study 8/2/2022:  No DVT seen.   Reflux bilaterally.    Assessment/Plan:  Amanda James is a 72 y.o. female with HTN, HLD, IZABEL (not on CPAP), morbid obesity, who presents for an initial appointment.    BLE Lymphedema and Venous Reflux- Refer to lymphedema clinic.  Start bumex 0.5 mg daily.  Check  cmp in 1 week.  Pt to limit sodium intake to 2,000 mg daily.  Limit volume intake to 1.5 liters daily.  Elevate legs when resting.    2. HTN- Continue current medications.     3. HLD- Continue updated lipid panel.       4. Morbid Obesity- Encourage diet, exercise and weight loss.     Follow up in 4 months with lipids prior    Total duration of face to face visit time 30 minutes.  Total time spent counseling greater than fifty percent of total visit time.  Counseling included discussion regarding imaging findings, diagnosis, possibilities, treatment options, risks and benefits.  The patient had many questions regarding the options and long-term effects.    Giuseppe Lang MD, PhD  Interventional Cardiology      Follow up addendum 12/6/2022:  Mrs James has been compliant with compression of 20-30mmHg, elevation, and exercise for at least 4 weeks yet symptoms persists.

## 2022-09-15 NOTE — PATIENT INSTRUCTIONS
Assessment/Plan:  Amanda James is a 72 y.o. female with HTN, HLD, IZABEL (not on CPAP), morbid obesity, who presents for an initial appointment.    BLE Lymphedema and Venous Reflux- Refer to lymphedema clinic.  Start bumex 0.5 mg daily.  Check  cmp in 1 week.  Pt to limit sodium intake to 2,000 mg daily.  Limit volume intake to 1.5 liters daily.  Elevate legs when resting.    2. HTN- Continue current medications.     3. HLD- Continue updated lipid panel.       4. Morbid Obesity- Encourage diet, exercise and weight loss.     Follow up in 4 months with lipids prior

## 2022-09-20 ENCOUNTER — HOSPITAL ENCOUNTER (OUTPATIENT)
Dept: CARDIOLOGY | Facility: HOSPITAL | Age: 72
Discharge: HOME | End: 2022-09-20
Attending: INTERNAL MEDICINE
Payer: MEDICARE

## 2022-09-20 DIAGNOSIS — I87.2 VENOUS INSUFFICIENCY OF BOTH LOWER EXTREMITIES: ICD-10-CM

## 2022-09-20 LAB
IMMEDIATE ARM BP: 151 MMHG
IMMEDIATE LEFT ABI: 1.03
IMMEDIATE LEFT TIBIAL: 155 MMHG
IMMEDIATE RIGHT ABI: 1.08
IMMEDIATE RIGHT TIBIAL: 163 MMHG
LEFT ABI: 0.98
LEFT ARM BP: 122 MMHG
LEFT DORSALIS PEDIS: 110 MMHG
LEFT POSTERIOR TIBIAL: 120 MMHG
RIGHT ABI: 0.99
RIGHT ARM BP: 107 MMHG
RIGHT DORSALIS PEDIS: 114 MMHG
RIGHT POSTERIOR TIBIAL: 121 MMHG
TOE RAISES: 45

## 2022-09-20 PROCEDURE — 93924 LWR XTR VASC STDY BILAT: CPT

## 2022-09-20 PROCEDURE — 93924 ANKLE BRACHIAL INDICES (ABI): ICD-10-PCS | Mod: 26,,, | Performed by: INTERNAL MEDICINE

## 2022-09-20 PROCEDURE — 93924 LWR XTR VASC STDY BILAT: CPT | Mod: 26,,, | Performed by: INTERNAL MEDICINE

## 2022-09-21 NOTE — TELEPHONE ENCOUNTER
----- Message from Anna Frye sent at 9/21/2022 12:36 PM CDT -----  Regarding: medication  PT called about an Rx that was to be filled by Dr. Lang but pt has no name for the medication.  PT can be reached @ [phone]       Thanks

## 2022-09-22 ENCOUNTER — TELEPHONE (OUTPATIENT)
Dept: CARDIOLOGY | Facility: CLINIC | Age: 72
End: 2022-09-22
Payer: MEDICARE

## 2022-09-22 RX ORDER — BUMETANIDE 0.5 MG/1
0.5 TABLET ORAL DAILY
Qty: 90 TABLET | Refills: 3 | Status: SHIPPED | OUTPATIENT
Start: 2022-09-22 | End: 2022-10-05

## 2022-10-04 DIAGNOSIS — J30.2 SEASONAL ALLERGIES: ICD-10-CM

## 2022-10-04 NOTE — TELEPHONE ENCOUNTER
----- Message from Kirk Andrade sent at 10/4/2022  3:06 PM CDT -----  Contact: [phone]  Pt would like a call back about some appts her PCP wanted her to make. Pt said she needs to talk to someone about her refills.Please call pt back.

## 2022-10-04 NOTE — TELEPHONE ENCOUNTER
Spoke w/ pt and she states that she needed to schedule her lab appt and she needed her prescription for Flonase refilled. Pt also needed top schedule mammogram and is requesting a referral for a dietician. I assisted w/ scheduling labs and mammo. I notified the pt that I would send a message in  to the provider so that he mat refill her prescription and place a referral for the dietician. She states that she was told by Dr. Torres that she can only have a teaspoon of salt a day. She states that she will need help reading labs and knowing what diet she should be on. Pt understood. Please advise

## 2022-10-05 ENCOUNTER — PATIENT MESSAGE (OUTPATIENT)
Dept: INTERNAL MEDICINE | Facility: CLINIC | Age: 72
End: 2022-10-05
Payer: MEDICARE

## 2022-10-05 DIAGNOSIS — I12.9 HYPERTENSIVE CHRONIC KIDNEY DISEASE WITH STAGE 1 THROUGH STAGE 4 CHRONIC KIDNEY DISEASE, OR UNSPECIFIED CHRONIC KIDNEY DISEASE: ICD-10-CM

## 2022-10-05 DIAGNOSIS — E66.01 SEVERE OBESITY (BMI >= 40): Primary | ICD-10-CM

## 2022-10-05 RX ORDER — FLUTICASONE PROPIONATE 50 MCG
1 SPRAY, SUSPENSION (ML) NASAL DAILY
Qty: 16 G | Refills: 11 | Status: SHIPPED | OUTPATIENT
Start: 2022-10-05 | End: 2023-06-27 | Stop reason: SDUPTHER

## 2022-10-07 ENCOUNTER — HOSPITAL ENCOUNTER (OUTPATIENT)
Dept: RADIOLOGY | Facility: HOSPITAL | Age: 72
Discharge: HOME | End: 2022-10-07
Attending: OBSTETRICS & GYNECOLOGY
Payer: MEDICARE

## 2022-10-07 DIAGNOSIS — Z12.31 SCREENING MAMMOGRAM FOR HIGH-RISK PATIENT: ICD-10-CM

## 2022-10-07 PROCEDURE — 77067 MAMMO DIGITAL SCREENING BILAT WITH TOMO: ICD-10-PCS | Mod: 26,,, | Performed by: RADIOLOGY

## 2022-10-07 PROCEDURE — 77063 BREAST TOMOSYNTHESIS BI: CPT | Mod: 26,,, | Performed by: RADIOLOGY

## 2022-10-07 PROCEDURE — 77067 SCR MAMMO BI INCL CAD: CPT | Mod: TC

## 2022-10-07 PROCEDURE — 77063 BREAST TOMOSYNTHESIS BI: CPT | Mod: TC

## 2022-10-07 PROCEDURE — 77067 SCR MAMMO BI INCL CAD: CPT | Mod: 26,,, | Performed by: RADIOLOGY

## 2022-10-07 PROCEDURE — 77063 MAMMO DIGITAL SCREENING BILAT WITH TOMO: ICD-10-PCS | Mod: 26,,, | Performed by: RADIOLOGY

## 2022-10-30 ENCOUNTER — TELEPHONE (OUTPATIENT)
Dept: HEPATOLOGY | Facility: HOSPITAL | Age: 72
End: 2022-10-30
Payer: MEDICARE

## 2022-10-30 DIAGNOSIS — E78.5 HYPERLIPIDEMIA, UNSPECIFIED HYPERLIPIDEMIA TYPE: ICD-10-CM

## 2022-10-30 RX ORDER — ATORVASTATIN CALCIUM 40 MG/1
40 TABLET, FILM COATED ORAL DAILY
Qty: 90 TABLET | Refills: 3 | Status: SHIPPED | OUTPATIENT
Start: 2022-10-30 | End: 2022-12-08

## 2022-10-30 NOTE — TELEPHONE ENCOUNTER
Discussed results of lipid profile and need to start on statin given ASCVD risk score >7.5%. patient verbalized understanding and will  from her pharmacy.

## 2022-11-08 ENCOUNTER — OFFICE VISIT (OUTPATIENT)
Dept: SLEEP MEDICINE | Facility: CLINIC | Age: 72
End: 2022-11-08
Payer: MEDICARE

## 2022-11-08 VITALS — BODY MASS INDEX: 45 KG/M2 | HEIGHT: 66 IN | WEIGHT: 280 LBS

## 2022-11-08 DIAGNOSIS — Z78.9 INTOLERANCE OF CONTINUOUS POSITIVE AIRWAY PRESSURE (CPAP) VENTILATION: Primary | ICD-10-CM

## 2022-11-08 DIAGNOSIS — G47.30 SLEEP APNEA, UNSPECIFIED TYPE: ICD-10-CM

## 2022-11-08 DIAGNOSIS — G47.33 OSA (OBSTRUCTIVE SLEEP APNEA): ICD-10-CM

## 2022-11-08 PROCEDURE — 99999 PR PBB SHADOW E&M-EST. PATIENT-LVL III: CPT | Mod: PBBFAC,,, | Performed by: INTERNAL MEDICINE

## 2022-11-08 PROCEDURE — 99204 PR OFFICE/OUTPT VISIT, NEW, LEVL IV, 45-59 MIN: ICD-10-PCS | Mod: S$GLB,,, | Performed by: INTERNAL MEDICINE

## 2022-11-08 PROCEDURE — 1101F PT FALLS ASSESS-DOCD LE1/YR: CPT | Mod: CPTII,S$GLB,, | Performed by: INTERNAL MEDICINE

## 2022-11-08 PROCEDURE — 3288F FALL RISK ASSESSMENT DOCD: CPT | Mod: CPTII,S$GLB,, | Performed by: INTERNAL MEDICINE

## 2022-11-08 PROCEDURE — 1126F PR PAIN SEVERITY QUANTIFIED, NO PAIN PRESENT: ICD-10-PCS | Mod: CPTII,S$GLB,, | Performed by: INTERNAL MEDICINE

## 2022-11-08 PROCEDURE — 99999 PR PBB SHADOW E&M-EST. PATIENT-LVL III: ICD-10-PCS | Mod: PBBFAC,,, | Performed by: INTERNAL MEDICINE

## 2022-11-08 PROCEDURE — 3044F HG A1C LEVEL LT 7.0%: CPT | Mod: CPTII,S$GLB,, | Performed by: INTERNAL MEDICINE

## 2022-11-08 PROCEDURE — 3288F PR FALLS RISK ASSESSMENT DOCUMENTED: ICD-10-PCS | Mod: CPTII,S$GLB,, | Performed by: INTERNAL MEDICINE

## 2022-11-08 PROCEDURE — 3044F PR MOST RECENT HEMOGLOBIN A1C LEVEL <7.0%: ICD-10-PCS | Mod: CPTII,S$GLB,, | Performed by: INTERNAL MEDICINE

## 2022-11-08 PROCEDURE — 3008F PR BODY MASS INDEX (BMI) DOCUMENTED: ICD-10-PCS | Mod: CPTII,S$GLB,, | Performed by: INTERNAL MEDICINE

## 2022-11-08 PROCEDURE — 1159F MED LIST DOCD IN RCRD: CPT | Mod: CPTII,S$GLB,, | Performed by: INTERNAL MEDICINE

## 2022-11-08 PROCEDURE — 1101F PR PT FALLS ASSESS DOC 0-1 FALLS W/OUT INJ PAST YR: ICD-10-PCS | Mod: CPTII,S$GLB,, | Performed by: INTERNAL MEDICINE

## 2022-11-08 PROCEDURE — 3008F BODY MASS INDEX DOCD: CPT | Mod: CPTII,S$GLB,, | Performed by: INTERNAL MEDICINE

## 2022-11-08 PROCEDURE — 1159F PR MEDICATION LIST DOCUMENTED IN MEDICAL RECORD: ICD-10-PCS | Mod: CPTII,S$GLB,, | Performed by: INTERNAL MEDICINE

## 2022-11-08 PROCEDURE — 99204 OFFICE O/P NEW MOD 45 MIN: CPT | Mod: S$GLB,,, | Performed by: INTERNAL MEDICINE

## 2022-11-08 PROCEDURE — 1126F AMNT PAIN NOTED NONE PRSNT: CPT | Mod: CPTII,S$GLB,, | Performed by: INTERNAL MEDICINE

## 2022-11-08 NOTE — PROGRESS NOTES
"NEW PATIENT VISIT    Amanda James  is a pleasant 72 y.o. female  with PMH significant for HTN, Iron deficiency anemia, obesity, and lymphedema of lower extremities  who presents for evaluation of sleep apnea (dx 2016), not currently using CPAP (last time used 2018). 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    Prior sleep studies:   2016    Trouble falling asleep?: No  Trouble staying asleep?: Yes  Hypnotic use?:      Bed partner:    No  Witnessed snoring ?:   Yes  Snoring arousals?:  No  Witnessed apneas?  No    Sleepy if inactive?:  Sometimes  ESS:          PAP history   Problems    Mask Nasal pillows   Pressure    DME N/a   Machine age No longer has, received 2016   Download N/a         SLEEP SCHEDULE   Bed Time 11 PM   Sleep Latency 5 minutes    Arousals 3 times   Back to sleep Relatively quickly   Wake time 7 AM   Naps No   Nocturia Once    Work [facility]         Vitals:    11/08/22 1530   Weight: 127 kg (280 lb)   Height: 5' 6" (1.676 m)       Physical Exam:    GEN:   Well-appearing  Psych:  Appropriate affect, demonstrates insight  SKIN:  No rash on the face or bridge of the nose      LABS:   Lab Results   Component Value Date    HGB 13.6 10/07/2022    CO2 29 10/07/2022       RECORDS REVIEWED PREVIOUSLY:    Prior sleep studies are unavailable.    ASSESSMENT    No flowsheet data found.    Presenting Complaint:    PROBLEM DESCRIPTION/ Sx on Presentation  STATUS   hx IZABEL   Dx 2016  New   CPAP intolerance   Did best with nasal pillows  Fell out of the habit     Daytime Sx   denies sleepiness when inactive   ESS n/a/24 on intake  New   Insomnia   Onset:                    trouble falling asleep  Maintenance:           Prior hypnotics:        Current hypnotics:     New   Nocturia   x 1 per sleep period  New   Allergic sinusitis  + chronic congestion, controlled on current regimen  Antihistamine: singulair  Nasal sprays: flonase daily  Surgeries:     New   Other issues: HTN, LIANE, vit D def, " pre DM, BMI 40+    PLAN     -recommend sleep testing (split to requalify)  -discussed trial therapy if IZABEL present and the patient is  open to a trial of CPAP therapy    RTC          The patient was given open opportunity to ask questions and/or express concerns about treatment plan.   All questions/concerns were discussed.     Two patient identifiers used prior to evaluation.

## 2022-11-11 ENCOUNTER — TELEPHONE (OUTPATIENT)
Dept: INTERNAL MEDICINE | Facility: CLINIC | Age: 72
End: 2022-11-11
Payer: MEDICARE

## 2022-11-11 DIAGNOSIS — Z12.31 SCREENING MAMMOGRAM FOR BREAST CANCER: Primary | ICD-10-CM

## 2022-11-11 NOTE — TELEPHONE ENCOUNTER
----- Message from [address] sent at 11/11/2022  2:25 PM CST -----  Contact: [phone]  Pt's grandson was dx with the flu type a 1 week ago. His syptoms and fever are gone. Pt wants to know if she can go by him now or if he is still contagious. She did not have her flu shot yet. If she gets her flu shot, how long will it take for her to have immunity?

## 2022-12-16 ENCOUNTER — OFFICE VISIT (OUTPATIENT)
Dept: URGENT CARE | Facility: CLINIC | Age: 72
End: 2022-12-16
Payer: MEDICARE

## 2022-12-16 VITALS
OXYGEN SATURATION: 96 % | HEIGHT: 65 IN | HEART RATE: 68 BPM | WEIGHT: 280 LBS | RESPIRATION RATE: 19 BRPM | DIASTOLIC BLOOD PRESSURE: 61 MMHG | SYSTOLIC BLOOD PRESSURE: 101 MMHG | TEMPERATURE: 99 F | BODY MASS INDEX: 46.65 KG/M2

## 2022-12-16 DIAGNOSIS — M47.816 LUMBAR SPONDYLOSIS: ICD-10-CM

## 2022-12-16 DIAGNOSIS — S39.012A LUMBAR STRAIN, INITIAL ENCOUNTER: Primary | ICD-10-CM

## 2022-12-16 DIAGNOSIS — G89.29 CHRONIC RIGHT-SIDED LOW BACK PAIN WITHOUT SCIATICA: ICD-10-CM

## 2022-12-16 DIAGNOSIS — M54.50 CHRONIC RIGHT-SIDED LOW BACK PAIN WITHOUT SCIATICA: ICD-10-CM

## 2022-12-16 DIAGNOSIS — M16.11 ARTHROPATHY OF RIGHT HIP: ICD-10-CM

## 2022-12-16 PROCEDURE — 3074F SYST BP LT 130 MM HG: CPT | Mod: CPTII,S$GLB,, | Performed by: PHYSICIAN ASSISTANT

## 2022-12-16 PROCEDURE — 3008F BODY MASS INDEX DOCD: CPT | Mod: CPTII,S$GLB,, | Performed by: PHYSICIAN ASSISTANT

## 2022-12-16 PROCEDURE — 99214 OFFICE O/P EST MOD 30 MIN: CPT | Mod: S$GLB,,, | Performed by: PHYSICIAN ASSISTANT

## 2022-12-16 PROCEDURE — 3078F PR MOST RECENT DIASTOLIC BLOOD PRESSURE < 80 MM HG: ICD-10-PCS | Mod: CPTII,S$GLB,, | Performed by: PHYSICIAN ASSISTANT

## 2022-12-16 PROCEDURE — 1125F AMNT PAIN NOTED PAIN PRSNT: CPT | Mod: CPTII,S$GLB,, | Performed by: PHYSICIAN ASSISTANT

## 2022-12-16 PROCEDURE — 3044F PR MOST RECENT HEMOGLOBIN A1C LEVEL <7.0%: ICD-10-PCS | Mod: CPTII,S$GLB,, | Performed by: PHYSICIAN ASSISTANT

## 2022-12-16 PROCEDURE — 3078F DIAST BP <80 MM HG: CPT | Mod: CPTII,S$GLB,, | Performed by: PHYSICIAN ASSISTANT

## 2022-12-16 PROCEDURE — 99214 PR OFFICE/OUTPT VISIT, EST, LEVL IV, 30-39 MIN: ICD-10-PCS | Mod: S$GLB,,, | Performed by: PHYSICIAN ASSISTANT

## 2022-12-16 PROCEDURE — 1125F PR PAIN SEVERITY QUANTIFIED, PAIN PRESENT: ICD-10-PCS | Mod: CPTII,S$GLB,, | Performed by: PHYSICIAN ASSISTANT

## 2022-12-16 PROCEDURE — 3044F HG A1C LEVEL LT 7.0%: CPT | Mod: CPTII,S$GLB,, | Performed by: PHYSICIAN ASSISTANT

## 2022-12-16 PROCEDURE — 3074F PR MOST RECENT SYSTOLIC BLOOD PRESSURE < 130 MM HG: ICD-10-PCS | Mod: CPTII,S$GLB,, | Performed by: PHYSICIAN ASSISTANT

## 2022-12-16 PROCEDURE — 3008F PR BODY MASS INDEX (BMI) DOCUMENTED: ICD-10-PCS | Mod: CPTII,S$GLB,, | Performed by: PHYSICIAN ASSISTANT

## 2022-12-16 PROCEDURE — 1159F MED LIST DOCD IN RCRD: CPT | Mod: CPTII,S$GLB,, | Performed by: PHYSICIAN ASSISTANT

## 2022-12-16 PROCEDURE — 1159F PR MEDICATION LIST DOCUMENTED IN MEDICAL RECORD: ICD-10-PCS | Mod: CPTII,S$GLB,, | Performed by: PHYSICIAN ASSISTANT

## 2022-12-16 RX ORDER — DICLOFENAC SODIUM 10 MG/G
2 GEL TOPICAL 3 TIMES DAILY PRN
Qty: 100 G | Refills: 0 | Status: SHIPPED | OUTPATIENT
Start: 2022-12-16 | End: 2024-02-19

## 2022-12-16 RX ORDER — TIZANIDINE 4 MG/1
4 TABLET ORAL EVERY 12 HOURS PRN
Qty: 20 TABLET | Refills: 0 | Status: SHIPPED | OUTPATIENT
Start: 2022-12-16 | End: 2022-12-26

## 2022-12-16 RX ORDER — LIDOCAINE 50 MG/G
1 PATCH TOPICAL DAILY
Qty: 7 PATCH | Refills: 0 | Status: SHIPPED | OUTPATIENT
Start: 2022-12-16 | End: 2023-08-08

## 2022-12-16 NOTE — PROGRESS NOTES
"Subjective:       Patient ID: Amanda James is a 72 y.o. female.    Vitals:  height is 5' 5" (1.651 m) and weight is 127 kg (280 lb). Her temperature is 98.9 °F (37.2 °C). Her blood pressure is 101/61 and her pulse is 68. Her respiration is 19 and oxygen saturation is 96%.   Body mass index is 46.59 kg/m².      Chief Complaint: Back Pain    72-year-old female with a history of hypertension, hyperlipidemia,, allergy, for sleep apnea, iron deficiency anemia, previous gastric ulcer with bleeding, venous insufficiency of bilateral lower extremity, lymphedema bilateral lower extremity, morbid obesity BMI 46, and other comorbidities who presents urgent care clinic for evaluation.  Complaining of right-sided low back pain that started 2 months ago that waxes and wanes.  Uses heat, hot shower, and OTC lidocaine pain cream which he was resolves it.  Denies any injury or falls, bladder bowel incontinence, saddle anesthesia, radiating leg pain, leg weakness, gait instability, urinary symptoms, hematuria, constipation, or any other complaints.  Has not seen anyone for this.  Presents to urgent Care since pain started this morning and did not improve.  Pain worsens with palpation or any lumbar twisting motions.      Medical assistant note:  Pt states chronic right sided low back pain.  Pt states lower extremity edema is chronic and unchanged from previous.    Back Pain  This is a recurrent problem. The current episode started more than 1 month ago. The problem occurs constantly. The problem has been gradually worsening since onset. The pain is present in the lumbar spine. The pain does not radiate. The pain is at a severity of 9/10. The symptoms are aggravated by bending, twisting and position. Pertinent negatives include no abdominal pain, bladder incontinence, bowel incontinence, chest pain, dysuria, fever, headaches, leg pain, numbness, paresis, paresthesias, pelvic pain, perianal numbness, tingling, weakness or weight " loss.     Constitution: Negative for activity change, appetite change, chills, sweating, fatigue, fever and generalized weakness.   HENT:  Negative for ear pain, hearing loss, facial swelling, congestion, postnasal drip, sinus pain, sinus pressure, sore throat, trouble swallowing and voice change.    Neck: Negative for neck pain, neck stiffness and painful lymph nodes.   Cardiovascular:  Negative for chest pain, palpitations, sob on exertion and passing out.   Eyes:  Negative for eye discharge, eye pain, photophobia, vision loss, double vision and blurred vision.   Respiratory:  Negative for chest tightness, cough, sputum production, bloody sputum, COPD, shortness of breath, stridor, wheezing and asthma.    Gastrointestinal:  Negative for abdominal pain, nausea, vomiting, constipation, diarrhea, bright red blood in stool, rectal bleeding, heartburn and bowel incontinence.   Genitourinary:  Negative for dysuria, frequency, urgency, urine decreased, flank pain, bladder incontinence, hematuria and pelvic pain.   Musculoskeletal:  Positive for back pain and muscle ache. Negative for trauma, joint pain, joint swelling, abnormal ROM of joint and muscle cramps.   Skin:  Negative for color change, pale, rash and wound.   Allergic/Immunologic: Negative for seasonal allergies, asthma and immunocompromised state.   Neurological:  Negative for dizziness, history of vertigo, light-headedness, passing out, facial drooping, speech difficulty, coordination disturbances, loss of balance, headaches, disorientation, altered mental status, loss of consciousness, numbness, tingling and seizures.   Hematologic/Lymphatic: Negative for swollen lymph nodes, easy bruising/bleeding and trouble clotting. Does not bruise/bleed easily.   Psychiatric/Behavioral:  Negative for altered mental status and disorientation.        Past Medical History:   Diagnosis Date    Allergy     Asthma     Hyperlipidemia     Hypertension     IZABEL (obstructive sleep  apnea)     Pre-diabetes        Objective:      Physical Exam   Constitutional: She is oriented to person, place, and time. She appears well-developed. She is cooperative.  Non-toxic appearance. She does not appear ill. No distress. obesity  HENT:   Head: Normocephalic and atraumatic.   Ears:   Right Ear: Hearing, external ear and ear canal normal. No no drainage, swelling or tenderness.   Left Ear: Hearing, external ear and ear canal normal. No no drainage, swelling or tenderness.   Nose: Nose normal. No rhinorrhea or purulent discharge. Right sinus exhibits no maxillary sinus tenderness and no frontal sinus tenderness. Left sinus exhibits no maxillary sinus tenderness and no frontal sinus tenderness.   Mouth/Throat: Uvula is midline, oropharynx is clear and moist and mucous membranes are normal. No oral lesions. No trismus in the jaw. No uvula swelling. No oropharyngeal exudate, posterior oropharyngeal edema or posterior oropharyngeal erythema. No tonsillar exudate.   Eyes: Conjunctivae, EOM and lids are normal. Pupils are equal, round, and reactive to light. No visual field deficit is present. Right eye exhibits no discharge. Left eye exhibits no discharge. Right conjunctiva is not injected. Right conjunctiva has no hemorrhage. Left conjunctiva is not injected. Left conjunctiva has no hemorrhage. Extraocular movement intact vision grossly intact gaze aligned appropriately   Neck: Neck supple.      Comments: No rené tenderness/erythema or Homans sign bilaterally.     No neck rigidity present.   Cardiovascular: Normal rate, regular rhythm, normal heart sounds and normal pulses.   No murmur heard.  Pulmonary/Chest: Effort normal and breath sounds normal. No accessory muscle usage or stridor. No respiratory distress. She has no wheezes. She exhibits no tenderness.   Abdominal: Normal appearance. She exhibits no distension and no mass. Soft. There is no abdominal tenderness. There is no rebound, no guarding, no left CVA  tenderness and no right CVA tenderness.   Musculoskeletal: Normal range of motion.         General: Tenderness present. Normal range of motion.      Comments: Spinal exam:   Moves all extremities with good strength 5/5  BUE- deltoid, triceps, biceps, wrist ext/flexion, hand , and interossei  BLE- hip flexion, knee ext/flexion, dorsiflexion, plantar flexion, EHL, ankle inversion, and ankle eversion    Gait antalgic    Negative straight leg raise or clonus     Sensation intact to light touch throughout.  2+ DTR bilaterally.    Full back ROM; pain with all ROM  Full neck ROM; no pain with all ROM.    Negative Lhermitte's or Spurling's    There is no tenderness to palpation of midline spine or paraspinal muscles.  There is no bony step-off or bony tenderness to palpation.      There is muscle spasms present at lower right lumbar paraspinal muscle and gluteal muscles.     No tenderness to palpation of bilateral SI joint or trochanteric bursa  No pain on hip ROM.   Mc's test negative       Lymphadenopathy:     She has no cervical adenopathy.   Neurological: no focal deficit. She is alert, oriented to person, place, and time and at baseline. She has normal motor skills and normal sensation. She displays no weakness, facial symmetry, normal reflexes and no dysarthria. No cranial nerve deficit or sensory deficit. She exhibits normal muscle tone. She has a normal Finger-Nose-Finger Test. Coordination: Heel to shin test normal. She shows no pronator drift. She displays no seizure activity. Gait and coordination normal. Coordination normal. GCS eye subscore is 4. GCS verbal subscore is 5. GCS motor subscore is 6.   Skin: Skin is warm, dry, not diaphoretic and no rash. Capillary refill takes less than 2 seconds.              Comments: Chronic bilateral lower extremity nonpitting edema   Psychiatric: Her speech is normal and behavior is normal. Thought content normal.   Nursing note and vitals reviewed.        Narrative  & Impression  EXAMINATION:  XR HIP 3 OR 4 VIEWS BILATERAL     CLINICAL HISTORY:  Pain in right hip     TECHNIQUE:  Bilateral hips and AP pelvis     COMPARISON:  None     FINDINGS:  No fracture or dislocation.  Enthesophyte versus osteophyte at the right greater trochanter is seen.     Bridging osteophytes right L4 - L5 and right and left L5-S1 are noted with probable degenerative disc space narrowing, L5-S1 greater than L4-L5.     Impression:     1. No convincing evidence of acute abnormality.  2.  Degenerative changes lower lumbar spine.  3.  Enthesophyte versus osteophyte right greater trochanter.  Hip CT or MRI can be performed, if clinically indicated.        Electronically signed by: Kisha Bailey  Date:                                            06/21/2019  Time:                                           17:29  Assessment:       1. Lumbar strain, initial encounter    2. Chronic right-sided low back pain without sciatica    3. Lumbar spondylosis    4. Arthropathy of right hip        On exam, patient is nontoxic appearing and vitals are stable.  Patient is essentially neurovascularly intact on exam.  Reviewed previous hip x-ray June 2019 with code mild osteophyte on right greater trochanter but significant lumbar degenerative disc disease is spondylosis on lower lumbar levels.  Xrays showed no acute fracture or dislocation.  Diagnostic testing results were independently reviewed and interpreted, which were discussed in depth with patient.    Unable to tolerate oral anti-inflammatory.  We discussed topical anti-inflammatory with low risk  Patient was prescribed medications and recommended OTC treatments for their symptoms. Patient was treated conservatively with activity modification, OTC pain reliever, muscle stretches, and RICE therapy. Patient was referred to orthopedics for evaluation. If symptoms do not improve/worsens, patient was referred back to PCP for continued outpatient workup and management.      Discussed diet, exercise, and weight loss given their obesity.    Patient was instructed to return for re-evaluation for any worsening or change in current symptoms. Strict ED versus clinic precautions given in depth. Discharge and follow-up instructions given verbally/printed with the patient who expressed understanding and willingness to comply with my recommendations.  Patient verbalized understanding and agreed with the entirety of plan of care.    Note dictated with voice recognition software, please excuse any grammatical errors.    Plan:         Lumbar strain, initial encounter  -     Ambulatory referral/consult to Orthopedics  -     LIDOcaine (LIDODERM) 5 %; Place 1 patch onto the skin once daily. Remove & Discard patch within 12 hours or as directed by MD  Dispense: 7 patch; Refill: 0  -     diclofenac sodium (VOLTAREN) 1 % Gel; Apply 2 g topically 3 (three) times daily as needed (Muscle joint pain).  Dispense: 100 g; Refill: 0  -     tiZANidine (ZANAFLEX) 4 MG tablet; Take 1 tablet (4 mg total) by mouth every 12 (twelve) hours as needed (Muscle spasms).  Dispense: 20 tablet; Refill: 0    Chronic right-sided low back pain without sciatica  -     Ambulatory referral/consult to Orthopedics  -     LIDOcaine (LIDODERM) 5 %; Place 1 patch onto the skin once daily. Remove & Discard patch within 12 hours or as directed by MD  Dispense: 7 patch; Refill: 0  -     diclofenac sodium (VOLTAREN) 1 % Gel; Apply 2 g topically 3 (three) times daily as needed (Muscle joint pain).  Dispense: 100 g; Refill: 0  -     tiZANidine (ZANAFLEX) 4 MG tablet; Take 1 tablet (4 mg total) by mouth every 12 (twelve) hours as needed (Muscle spasms).  Dispense: 20 tablet; Refill: 0    Lumbar spondylosis  -     Ambulatory referral/consult to Orthopedics  -     LIDOcaine (LIDODERM) 5 %; Place 1 patch onto the skin once daily. Remove & Discard patch within 12 hours or as directed by MD  Dispense: 7 patch; Refill: 0  -     diclofenac sodium  (VOLTAREN) 1 % Gel; Apply 2 g topically 3 (three) times daily as needed (Muscle joint pain).  Dispense: 100 g; Refill: 0  -     tiZANidine (ZANAFLEX) 4 MG tablet; Take 1 tablet (4 mg total) by mouth every 12 (twelve) hours as needed (Muscle spasms).  Dispense: 20 tablet; Refill: 0    Arthropathy of right hip  -     Ambulatory referral/consult to Orthopedics  -     LIDOcaine (LIDODERM) 5 %; Place 1 patch onto the skin once daily. Remove & Discard patch within 12 hours or as directed by MD  Dispense: 7 patch; Refill: 0  -     diclofenac sodium (VOLTAREN) 1 % Gel; Apply 2 g topically 3 (three) times daily as needed (Muscle joint pain).  Dispense: 100 g; Refill: 0              Additional MDM:     Heart Failure Score:   COPD = No

## 2022-12-16 NOTE — PATIENT INSTRUCTIONS
PLEASE READ YOUR DISCHARGE INSTRUCTIONS ENTIRELY AS IT CONTAINS IMPORTANT INFORMATION.  - OTC Tylenol/anti-inflammatory as needed for pain (see below). These medications can be obtained over the counter at any local pharmacy without requiring a prescription.   OTC ORAL medications for pain reliever or fever reducing:  - Acetaminophen (tylenol 650mg every 8 hour as needed with food)as directed as needed for fever/pain. Avoid tylenol if you have a history of liver disease or allergic reactions. Do not take ibuprofen if you have a history of allergic reactions, stomach bleeding ulcers, kidney disease, or if you take blood thinners.  -The patient was advised that NSAID-type medications have two very important potential side effects: gastrointestinal irritation including hemorrhage and renal injuries. patient was asked to take the medication with food and to stop if patient experiences any GI upset. I asked patient to call for vomiting, abdominal pain or black/bloody stools. The patient expresses understanding of these issues and all questions were answered.    OTC TOPICAL meds for pain reliever :  -You can apply OTC diclofenac or Volatren Gel 2-3 times daily to muscle or joints.  -You can also apply OTC lidocaine 4-5% with OR without menthol ointment 2-3 daily to muscle or joints.  -Please let one absorb before using the other. Do not use both at the same time as it may decrease efficacy. Please stop using if you develop any skin rash or irritation.  -Please wash your hands after application of these topical products.     - no operating machinery with muscle relaxant as it may cause drowsiness.  - continue heat (muscle) /ice (bone/joint) compression, rice therapy, and muscle stretches.   - Radiographs and all diagnostic testing reviewed with patient  - if no improvement or worsening symptoms, recommend follow-up with *orthopedics or PCP for further evaluation.  Please call the number below to set up appointment; if a  referral has been placed.  - Follow up with your PCP or specialty clinic as directed.  You can call [phone] or [phone] to schedule an appointment with the appropriate provider.      - discussed weight loss given obesity  -You must understand that you've received an Urgent Care treatment only and that you may be released before all your medical problems are known or treated. You, the patient, will arrange for follow up care as instructed. Please arrange follow up with your primary medical clinic within 2-5 days if your signs and symptoms have not resolved or worsen.     - If your condition worsens or fails to improve we recommend that you receive another evaluation at the emergency room immediately or contact your primary medical clinic to discuss your concerns in next 2-5 days.  Strict ER versus clinic precautions given.      RED FLAGS/WARNING SYMPTOMS DISCUSSED WITH PATIENT THAT WOULD WARRANT EMERGENT MEDICAL ATTENTION. Patient aware and verbalized understanding.      RICE     Rest an injury, elevate it, and use ice and compression as directed.   RICE stands for rest, ice, compression, and elevation. These can limit pain and swelling after an injury. RICE may be recommended to help treat fractures, sprains, strains, and bruises or bumps.   Home care  The following explain the details of RICE:  Rest. Limit the use of the injured body part. This helps prevent further damage to the body part and gives it time to heal. In some cases, you may need a sling, brace, splint, or cast to help keep the body part still until it has healed.  Ice. Applying ice right after an injury helps relieve pain and swelling. Wrap a bag of ice in a thin towel. Then, place it over the injured area. Do this for 10 to 15 minutes every 3 to 4 hours. Continue for the next 1 to 3 days or until your symptoms improve. Never put ice directly on your skin or ice an area longer than 15 minutes at a time.  Compression. Putting pressure on an  injury helps reduce swelling and provides support. Wrap the injured area firmly with an elastic bandage/wrap. Make sure not to wrap the bandage too tightly or you will cut off blood flow to the injured area. If your bandage loosens, rewrap it.  Elevation. Keeping an injury raised above the level of your heart reduces swelling, pain, and throbbing. For instance, if you have a broken leg, it may help to rest your leg on several pillows when sitting or lying down. Try to keep the injured area elevated for at least 2 to 3 hours per day.  Follow-up care  Follow up with your healthcare provider, or as advised.  When to seek medical advice  Call your healthcare provider right away if any of these occur:  Fever of 100.4°F (38°C) or higher, or as directed by your healthcare provider  Increased pain or swelling in the injured body part  Injured body part becomes cold, blue, numb, or tingly  Signs of infection. These include warmth in the skin, redness, drainage, or bad smell coming from the injured body part.  Date Last Reviewed: 1/18/2016 © 2000-2017 [employer]. [address] [place] [id number]. All rights reserved. This information is not intended as a substitute for professional medical care. Always follow your healthcare professional's instructions.             PROVIDER:[TOKEN:[[id number]],FOLLOWUP:[Routine]]

## 2022-12-18 ENCOUNTER — OFFICE VISIT (OUTPATIENT)
Dept: URGENT CARE | Facility: CLINIC | Age: 72
End: 2022-12-18
Payer: MEDICARE

## 2022-12-18 VITALS
HEIGHT: 65 IN | RESPIRATION RATE: 18 BRPM | HEART RATE: 75 BPM | TEMPERATURE: 99 F | BODY MASS INDEX: 43.34 KG/M2 | DIASTOLIC BLOOD PRESSURE: 78 MMHG | WEIGHT: 260.13 LBS | SYSTOLIC BLOOD PRESSURE: 120 MMHG | OXYGEN SATURATION: 100 %

## 2022-12-18 DIAGNOSIS — R42 DIZZINESS: ICD-10-CM

## 2022-12-18 DIAGNOSIS — H69.93 EUSTACHIAN TUBE DYSFUNCTION, BILATERAL: Primary | ICD-10-CM

## 2022-12-18 LAB — GLUCOSE SERPL-MCNC: 96 MG/DL (ref 70–110)

## 2022-12-18 PROCEDURE — 82962 GLUCOSE BLOOD TEST: CPT | Mod: S$GLB,,, | Performed by: PHYSICIAN ASSISTANT

## 2022-12-18 PROCEDURE — 1160F RVW MEDS BY RX/DR IN RCRD: CPT | Mod: CPTII,S$GLB,, | Performed by: PHYSICIAN ASSISTANT

## 2022-12-18 PROCEDURE — 3008F BODY MASS INDEX DOCD: CPT | Mod: CPTII,S$GLB,, | Performed by: PHYSICIAN ASSISTANT

## 2022-12-18 PROCEDURE — 3044F HG A1C LEVEL LT 7.0%: CPT | Mod: CPTII,S$GLB,, | Performed by: PHYSICIAN ASSISTANT

## 2022-12-18 PROCEDURE — 82962 POCT GLUCOSE, HAND-HELD DEVICE: ICD-10-PCS | Mod: S$GLB,,, | Performed by: PHYSICIAN ASSISTANT

## 2022-12-18 PROCEDURE — 1159F PR MEDICATION LIST DOCUMENTED IN MEDICAL RECORD: ICD-10-PCS | Mod: CPTII,S$GLB,, | Performed by: PHYSICIAN ASSISTANT

## 2022-12-18 PROCEDURE — 3008F PR BODY MASS INDEX (BMI) DOCUMENTED: ICD-10-PCS | Mod: CPTII,S$GLB,, | Performed by: PHYSICIAN ASSISTANT

## 2022-12-18 PROCEDURE — 99212 PR OFFICE/OUTPT VISIT, EST, LEVL II, 10-19 MIN: ICD-10-PCS | Mod: S$GLB,,, | Performed by: PHYSICIAN ASSISTANT

## 2022-12-18 PROCEDURE — 1160F PR REVIEW ALL MEDS BY PRESCRIBER/CLIN PHARMACIST DOCUMENTED: ICD-10-PCS | Mod: CPTII,S$GLB,, | Performed by: PHYSICIAN ASSISTANT

## 2022-12-18 PROCEDURE — 3078F DIAST BP <80 MM HG: CPT | Mod: CPTII,S$GLB,, | Performed by: PHYSICIAN ASSISTANT

## 2022-12-18 PROCEDURE — 1159F MED LIST DOCD IN RCRD: CPT | Mod: CPTII,S$GLB,, | Performed by: PHYSICIAN ASSISTANT

## 2022-12-18 PROCEDURE — 3044F PR MOST RECENT HEMOGLOBIN A1C LEVEL <7.0%: ICD-10-PCS | Mod: CPTII,S$GLB,, | Performed by: PHYSICIAN ASSISTANT

## 2022-12-18 PROCEDURE — 99212 OFFICE O/P EST SF 10 MIN: CPT | Mod: S$GLB,,, | Performed by: PHYSICIAN ASSISTANT

## 2022-12-18 PROCEDURE — 3074F SYST BP LT 130 MM HG: CPT | Mod: CPTII,S$GLB,, | Performed by: PHYSICIAN ASSISTANT

## 2022-12-18 PROCEDURE — 3078F PR MOST RECENT DIASTOLIC BLOOD PRESSURE < 80 MM HG: ICD-10-PCS | Mod: CPTII,S$GLB,, | Performed by: PHYSICIAN ASSISTANT

## 2022-12-18 PROCEDURE — 3074F PR MOST RECENT SYSTOLIC BLOOD PRESSURE < 130 MM HG: ICD-10-PCS | Mod: CPTII,S$GLB,, | Performed by: PHYSICIAN ASSISTANT

## 2022-12-18 RX ORDER — METHYLPREDNISOLONE 4 MG/1
TABLET ORAL
Qty: 21 EACH | Refills: 0 | Status: SHIPPED | OUTPATIENT
Start: 2022-12-18 | End: 2023-01-08

## 2022-12-18 NOTE — PROGRESS NOTES
"Subjective:       Patient ID: Amanda James is a 72 y.o. female.    Vitals:  height is 5' 5" (1.651 m) and weight is 118 kg (260 lb 2.3 oz). Her temperature is 98.5 °F (36.9 °C). Her blood pressure is 120/78 and her pulse is 75. Her respiration is 18 and oxygen saturation is 100%.     Chief Complaint: Dizziness    72 y.o. female presents today with dizziness that began last Friday. Pt states she was in urgent care last week and her Bp reading was "low" and she believes that may have been the initial cause. Pt here to be "checked out"    Dizziness:   Chronicity:  New  Onset:  In the past 7 days (Last friday)  Progression since onset:  Gradually worsening  Frequency:  Constantly  Severity:  Mild  Duration: 5-10 minutes.  Dizziness characteristics:  Off-balance and trouble focusing eyes  Initial Spell Date and Length:  5 minutes   Associated symptoms: visual disturbances.no hearing loss, no ear congestion, no ear pain, no fever, no headaches, no tinnitus, no nausea, no vomiting, no diaphoresis, no aural fullness, no weakness, no light-headedness, no syncope, no palpitations, no panic, no facial weakness, no slurred speech, no numbness in extremities and no chest pain.  Aggravated by:  Nothing  Treatments tried:  Nothingno strokes, no cardiac surgery, no neurologic disease, no head trauma, no balance testing, no ear trauma, no ear surgery, no head trauma, no ear infections, no anxiety, no ear tubes, no environmental allergies, no MRI head and no CT head.    Constitution: Negative for activity change, appetite change, chills, sweating, fatigue, fever and unexpected weight change.   HENT:  Negative for ear pain, ear discharge, tinnitus, hearing loss, dental problem, drooling, mouth sores, tongue pain, tongue lesion, congestion, postnasal drip, sinus pain, sinus pressure, sore throat and trouble swallowing.    Neck: Negative for neck pain, neck stiffness and painful lymph nodes.   Cardiovascular:  Negative for chest " pain, leg swelling, palpitations, sob on exertion and passing out.   Eyes:  Negative for eye discharge and eye itching.   Respiratory:  Negative for chest tightness, cough, sputum production, shortness of breath and wheezing.    Gastrointestinal:  Negative for abdominal pain, nausea, vomiting, constipation and diarrhea.   Genitourinary:  Negative for dysuria, frequency, urgency, bladder incontinence, vaginal pain and vaginal discharge.   Musculoskeletal:  Negative for pain, muscle cramps and muscle ache.   Skin:  Negative for color change, pale and rash.   Allergic/Immunologic: Negative for environmental allergies.   Neurological:  Positive for dizziness. Negative for light-headedness, headaches, disorientation and altered mental status.   Hematologic/Lymphatic: Negative for swollen lymph nodes.   Psychiatric/Behavioral:  Negative for altered mental status, disorientation, confusion and agitation.    Past Medical History:   Diagnosis Date    Allergy     Asthma     Hyperlipidemia     Hypertension     IZABEL (obstructive sleep apnea)     Pre-diabetes        Past Surgical History:   Procedure Laterality Date    BREAST BIOPSY Left     benign    BREAST LUMPECTOMY Left     COLONOSCOPY N/A 8/14/2019    Procedure: COLONOSCOPY;  Surgeon: Clinton Gottlieb MD;  Location: [address]);  Service: Endoscopy;  Laterality: N/A;  BMI 47.45  IZABEL    ESOPHAGOGASTRODUODENOSCOPY N/A 8/14/2019    Procedure: EGD (ESOPHAGOGASTRODUODENOSCOPY);  Surgeon: Clinton Gottlieb MD;  Location: [address]);  Service: Endoscopy;  Laterality: N/A;  BMI 47.45  IZABEL    HYSTERECTOMY      PARTIAL HYSTERECTOMY      uterus removed       Family History   Problem Relation Age of Onset    Hypertension Mother     Coronary artery disease Father     Coronary artery disease Brother     Breast cancer Paternal Aunt     Colon cancer Neg Hx     Inflammatory bowel disease Neg Hx     Crohn's disease Neg Hx     Ulcerative colitis Neg Hx        Social History      Socioeconomic History    Marital status: Single   Tobacco Use    Smoking status: Former     Types: Cigarettes     Quit date: 1999     Years since quittin.5    Smokeless tobacco: Never   Substance and Sexual Activity    Alcohol use: Yes     Comment: socially/ rare    Drug use: No    Sexual activity: Not Currently       Current Outpatient Medications   Medication Sig Dispense Refill    atorvastatin (LIPITOR) 40 MG tablet Take 1 tablet (40 mg total) by mouth once daily. (Patient not taking: Reported on 2022) 90 tablet 3    bumetanide (BUMEX) 0.5 MG Tab Take 1 tablet (0.5 mg total) by mouth once daily. 90 tablet 3    cholecalciferol, vitamin D3, 50,000 unit capsule TK 1 C PO WEEKLY  3    diclofenac sodium (VOLTAREN) 1 % Gel Apply 2 g topically 3 (three) times daily as needed (Muscle joint pain). 100 g 0    ergocalciferol (ERGOCALCIFEROL) 50,000 unit Cap TAKE 1 CAPSULE EVERY 7 DAYS 12 capsule 3    ferrous sulfate (FEROSUL) 325 mg (65 mg iron) Tab tablet Take 1 tablet (325 mg total) by mouth once daily. 90 tablet 3    FLUoxetine 10 MG capsule TAKE 2 CAPSULES(20 MG) BY MOUTH EVERY  capsule 1    fluticasone propionate (FLONASE) 50 mcg/actuation nasal spray 1 spray (50 mcg total) by Each Nostril route once daily. 16 g 11    LIDOcaine (LIDODERM) 5 % Place 1 patch onto the skin once daily. Remove & Discard patch within 12 hours or as directed by MD 7 patch 0    LIDOcaine HCL 2% (XYLOCAINE) 2 % jelly Apply topically as needed. Apply topically once nightly to affected part of foot/feet. 30 mL 2    methylPREDNISolone (MEDROL DOSEPACK) 4 mg tablet use as directed 21 each 0    montelukast (SINGULAIR) 10 mg tablet Take 1 tablet (10 mg total) by mouth once daily. 90 tablet 3    multivit-min-iron-FA-lutein (CENTRUM SILVER WOMEN) 8 mg iron-400 mcg-300 mcg Tab Take 1 tablet by mouth once daily.      olmesartan-hydrochlorothiazide (BENICAR HCT) 40-12.5 mg Tab Take one tab po daily 90 tablet 3    tiZANidine  (ZANAFLEX) 4 MG tablet Take 1 tablet (4 mg total) by mouth every 12 (twelve) hours as needed (Muscle spasms). 20 tablet 0     No current facility-administered medications for this visit.       Review of patient's allergies indicates:   Allergen Reactions    Aspirin Other (See Comments)         Objective:      Physical Exam   Constitutional: She is oriented to person, place, and time. She appears well-developed. She is cooperative.  Non-toxic appearance. She does not appear ill. No distress.   HENT:   Head: Normocephalic and atraumatic.   Ears:   Right Ear: Hearing, external ear and ear canal normal. Tympanic membrane is not injected, not erythematous, not retracted and not bulging. A middle ear effusion is present. impacted cerumen  Left Ear: Hearing, external ear and ear canal normal. Tympanic membrane is not injected, not erythematous, not retracted and not bulging. A middle ear effusion is present. impacted cerumen  Nose: Nose normal. No mucosal edema, rhinorrhea, nasal deformity or congestion. No epistaxis. Right sinus exhibits no maxillary sinus tenderness and no frontal sinus tenderness. Left sinus exhibits no maxillary sinus tenderness and no frontal sinus tenderness.   Mouth/Throat: Uvula is midline, oropharynx is clear and moist and mucous membranes are normal. No trismus in the jaw. Normal dentition. No uvula swelling. No oropharyngeal exudate, posterior oropharyngeal edema or posterior oropharyngeal erythema.   Eyes: Conjunctivae and lids are normal. Right eye exhibits no discharge. Left eye exhibits no discharge. No scleral icterus.   Neck: Trachea normal and phonation normal. Neck supple. No edema present. No erythema present. No neck rigidity present.   Cardiovascular: Normal rate, regular rhythm, normal heart sounds and normal pulses.   No murmur heard.Exam reveals no gallop.   Pulmonary/Chest: Effort normal and breath sounds normal. No stridor. No respiratory distress. She has no decreased breath  sounds. She has no wheezes. She has no rhonchi. She has no rales.   Abdominal: Normal appearance.   Musculoskeletal:      Cervical back: She exhibits no tenderness.      Right lower leg: No edema.      Left lower leg: No edema.   Lymphadenopathy:     She has no cervical adenopathy.   Neurological: She is alert and oriented to person, place, and time. She displays no weakness. She exhibits normal muscle tone. Coordination normal.   Skin: Skin is warm, dry, intact, not diaphoretic, not pale and no rash.   Psychiatric: Her speech is normal and behavior is normal. Judgment and thought content normal.   Nursing note and vitals reviewed.  Results for orders placed or performed in visit on 12/18/22   POCT Glucose, Hand-Held Device   Result Value Ref Range    POC Glucose 96 70 - 110 MG/DL           Assessment:       1. Eustachian tube dysfunction, bilateral    2. Dizziness          Plan:     Results reviewed    Eustachian tube dysfunction, bilateral  -     methylPREDNISolone (MEDROL DOSEPACK) 4 mg tablet; use as directed  Dispense: 21 each; Refill: 0    Dizziness  -     POCT Glucose, Hand-Held Device         Patient Instructions   Stop zanaflex use. Use medrol dose pack as prescribed with food. Follow up as needed

## 2022-12-27 ENCOUNTER — CLINICAL SUPPORT (OUTPATIENT)
Dept: REHABILITATION | Facility: HOSPITAL | Age: 72
End: 2022-12-27
Payer: MEDICARE

## 2022-12-27 DIAGNOSIS — I89.0 LYMPHEDEMA OF BOTH LOWER EXTREMITIES: ICD-10-CM

## 2022-12-27 DIAGNOSIS — I87.2 VENOUS INSUFFICIENCY OF BOTH LOWER EXTREMITIES: Primary | ICD-10-CM

## 2022-12-27 DIAGNOSIS — R60.0 EDEMA OF BOTH LOWER EXTREMITIES: ICD-10-CM

## 2022-12-27 PROCEDURE — 97166 OT EVAL MOD COMPLEX 45 MIN: CPT

## 2022-12-27 NOTE — PLAN OF CARE
[facility]  Occupational Therapy Initial Evaluation    Date: 12/27/2022  Name: Amanda James  Clinic Number: [id number]    Therapy Diagnosis:   Encounter Diagnoses   Name Primary?    Lymphedema of both lower extremities     Venous insufficiency of both lower extremities Yes    Edema of both lower extremities      Physician: Giuseppe Lang MD*    Physician Orders: OT Eval and Treat  Medical Diagnosis: bilateral lower extremity venous insufficiency  Evaluation Date: 12/27/2022  Insurance Authorization Period Expiration: 9/15/2022-9/15/2023  Plan of Care Certification Period: 12/27/2022-2/7/2023  Date of Return to MD: unknown  Visit # / Visits authorized: 1 / 1  FOTO: see media    Precautions:  Standard    Time In:10:00  Time Out: 11:00  Total Appointment Time (timed & untimed codes): 60 minutes    SUBJECTIVE     Date of Onset: years ago  Prior Therapy: none noted    History of Current Condition/Mechanism of Injury: Amanda James is a 72 y.o. female who presents to [facility] for evaluation secondary to lymphedema. Patient was referred to therapy by Giuseppe Lang MD* , which is the patient's vascular MD. Patient reports swelling in BLE for years. She states she has several pair of knee high compression socks, but no one told her to wear them.   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     Current Functional Status   Home/Living environment: lives with their family      Limitation of Functional Status as follows:   ADLs/IADLs:     - Feeding: independent    - Bathing: independent    - Dressing/Grooming: independent    - Driving: relies on transportation or her daughter     Leisure: n/a    Pain:  Functional Pain Scale Rating 0-10: Current 0/10, worst 0/10, best 0/10   Location:  BLE  Description: no reports of pain/heaviness or tightness in BLE  Aggravating Factors: increases in swelling throughout the day  Easing Factors: elevation    Patient's Goals for Therapy: to learn how to take care of these legs    Pt denies CHF, KF, DVT, CA, infection, severe PAD    Imaging: see chart    Medical History:   Past Medical History:   Diagnosis Date    Allergy     Asthma     Hyperlipidemia     Hypertension     IZABEL (obstructive sleep apnea)     Pre-diabetes        Surgical History:    has a past surgical history that includes Partial hysterectomy; Breast lumpectomy (Left); Breast biopsy (Left); Esophagogastroduodenoscopy (N/A, 8/14/2019); Colonoscopy (N/A, 8/14/2019); and Hysterectomy.    Medications:   has a current medication list which includes the following prescription(s): atorvastatin, bumetanide, cholecalciferol (vitamin d3), diclofenac sodium, ergocalciferol, ferrous sulfate, fluoxetine, fluticasone propionate, lidocaine, lidocaine hcl 2%, methylprednisolone, montelukast, multivit-min-iron-fa-lutein, and olmesartan-hydrochlorothiazide.    Allergies:   Review of patient's allergies indicates:   Allergen Reactions    Aspirin Other (See Comments)          OBJECTIVE     Patient arrived and was escorted to the back of the clinic in a w/c due to the long walk.     Affected Areas: RLE and LLE  Refill: Day   Stemmer Sign: negative RIGHT LOWER EXTREMITY  positive LLE     Tissue Texture: soft, pliable, pitting  Skin Integrity: intact  Sensation: intact      ROM/Strength: WFL BLE    Girth Measurements (in centimeters)  LANDMARK LEFT LE  12/27/2022 RIGHT LE  12/27/2022 DIFF   at eval   Floor+40cm 48. cm 46.5 cm -2.0 cm   Floor+30cm 44.5 cm 43.5 cm -2.0 cm   Floor+20cm 34.0 cm 29.5 cm -4.5 cm   Floor+10cm 32.5 cm 28.0 cm -4.5 cm   Pinky+5cm 25.5 cm 24.5 cm -1.0 cm   metatarsals 25.5 cm 25.0 cm -.5 cm                         Treatment   Total Treatment time (time-based codes) separate from Evaluation: 0  minutes      Discussed wear schedule, don/doff, wash and management of compression socks, which patient already owns.  Size and compression class and AM/PM needs.      Informed insurance coverage of compression is per DME provider and typically Medicare and Medicare group plans may not cover cost beyond pair of standard sized knee high garments.   Commitment to attendance as well as commitment to securing compression needs is critical to edema management.        Patient Education and Home Exercises      Education provided:   1. Educated on definition of lymphedema.  2. Explained the Complete Decongestive Therapy protocol in depth  3. Educated on Phase 1 and 2 of protocol.  4. Reviewed treatment frequency and likely duration of weeks  5.Contraindications for treatment.  6. Plan of care and goals.  7. Educated on home management protocols.     Home Exercises and Patient Education Provided  Education provided:   - Pt was educated in lymphedema etiology and management plans.  Pt was provided with written risk reductions and precautions for managing lymphedema.     Patient/Family Education: role of OT, goals for OT, scheduling/cancellations - pt verbalized understanding. Discussed insurance limitations with patient.      ASSESSMENT     Amanda James is a 72 y.o. female referred to outpatient occupational therapy and presents with a medical diagnosis of lymphedema secondary to venous insuffiency. Lymphedema, left untreated increases risk of infection, gait deviation causing ortho problems and poor body image. Patient presents with the following therapy deficits: lymphedema of bilateral lower limbs and demonstrates limitations as described in the chart below. Following medical record review it is determined that pt will benefit from complete decongestive therapy services for the treatment and management of this chronic condition. The following goals were discussed with the patient and patient is in agreement with them as  to be addressed in the treatment plan. The patient's rehab potential is Excellent.     Anticipated barriers to occupational therapy: none    Pt has no cultural, educational or language barriers to learning provided.    Profile and History Assessment of Occupational Performance Level of Clinical Decision Making Complexity Score   Occupational Profile:   Amanda James is a 72 y.o. female who lives with their family and is retired Amanda James has difficulty with  ADLs and IADLs as listed previously, which  Affecting herdaily functional abilities.      Comorbidities:    has a past medical history of Allergy, Asthma, Hyperlipidemia, Hypertension, IZABEL (obstructive sleep apnea), and Pre-diabetes.    Medical and Therapy History Review:   Expanded               Performance Deficits    Physical:  Edema    Cognitive:  No Deficits    Psychosocial:    No Deficits     Clinical Decision Making:  moderate    Assessment Process:  Detailed Assessments    Modification/Need for Assistance:  Minimal-Moderate Modifications/Assistance    Intervention Selection:  Several Treatment Options       moderate  Based on PMHX, co morbidities , data from assessments and functional level of assistance required with task and clinical presentation directly impacting function.       The following goals were discussed with the patient and patient is in agreement with them as to be addressed in the treatment plan.     Goals:   Short Term Goals for 3 weeks: (phase 1 of protocol)  1. Patient and/or caregiver will demonstrate understanding of lymphedema precautions to decrease the risk of infection and lymphedema exacerbation. - Ongoing 12/27/2022   2. Patient will tolerate daily activities wearing compression knee highs- Ongoing 12/27/2022   3. Patient and/or caregiver will order/obtain appropriate compression garments- met 12/27/2022  4. Patient will experience a decrease in girth of affected areas of 1.0 cm- Ongoing 12/27/2022      Long Term Goals  for 6 weeks: (Phase 2 of goals)  1. Patient and/or caregiver will be independent with donning and doffing of compression garments.  2. Patient and/or caregiver will be independent with HEP and lymphedema management to help prevent edema relapse and reduce risk of infection.  4. Patient will experience a decreased in girth of affected areas of 2.0 cm      PLAN   Plan of Care Certification: 12/27/2022 to 2/7/2023.     Outpatient Occupational Therapy 2 times weekly for 6 weeks to include the following interventions: Edema Control.      Yue Zamorano, OT, CLWT      I CERTIFY THE NEED FOR THESE SERVICES FURNISHED UNDER THIS PLAN OF TREATMENT AND WHILE UNDER MY CARE  Physician's comments:      Physician's Signature: ___________________________________________________

## 2023-01-03 ENCOUNTER — CLINICAL SUPPORT (OUTPATIENT)
Dept: REHABILITATION | Facility: HOSPITAL | Age: 73
End: 2023-01-03
Payer: MEDICARE

## 2023-01-03 DIAGNOSIS — R60.0 EDEMA OF BOTH LOWER EXTREMITIES: ICD-10-CM

## 2023-01-03 DIAGNOSIS — I89.0 LYMPHEDEMA OF BOTH LOWER EXTREMITIES: Primary | ICD-10-CM

## 2023-01-03 PROCEDURE — 97140 MANUAL THERAPY 1/> REGIONS: CPT

## 2023-01-03 NOTE — PROGRESS NOTES
[facility]  Occupational Therapy Treatment Note    Date: 1/3/2023  Name: Amanda [place]  Clinic Number: [id number]    Time In: 9:00  Time Out: 10:00  Total Billable Time: 60 minutes    Therapy Diagnosis:   Encounter Diagnoses   Name Primary?    Lymphedema of both lower extremities Yes    Edema of both lower extremities      Physician: Giuseppe Lang MD*  Physician Orders: OT Eval and Treat    Evaluation Date: 12/27/22  Insurance Authorization Period Expiration: 1/2/2023-2/28/2023  Plan of Care Expiration: 2/7/2023  Visit # / Visits authorized: 1 / 20    Precautions:  Standard and Diabetes    SUBJECTIVE     Pt reports: she started wearing the compression knee high socks that she had at home, and notices an improvement already in the legs, especially the L one.  She was compliant with home compression program given last session.   Response to previous treatment: first after visit today  Functional change: n/a    Pain: 0/10  Location: bilateral lower legs     OBJECTIVE     Pt arrived wearing a dress, and brought her knee high compression socks.    Objective Measures updated at progress report unless specified.    Treatment     Amanda received the treatments listed below:     Manual therapy techniques were applied for 60 minutes, including:    MANUAL LYMPHATIC DRAINAGE (MLD):    While supine with LEs elevated stimulation at terminus, along GI region, B inguinal regions, drainage of entire L LE zac lower leg, ankle, and foot with return proximally.  The thigh is very soft with no pitting edema noted. The lower leg has mild/moderate pitting throughout. Deep thumb [place] techniques were performed throughout the lower legs to break down the pitting edema. There is no pitting edema found over the foot.  All fluid was then shunted back up through the lateral leg and the abdominal and inguinal nodes were treated again.    MULTILAYERED BANDAGING:  patient declined short stretch bandages and  requested use of her knee high compression socks. Therapist donned the socks to BLE.  The socks appear to be well fitting.  Wash and wear schedules confirmed.     Pt was educated in potential compression needs.     Discussed wear schedule, don/doff, wash and management of socks  Size and compression class and AM/PM needs.      Informed insurance coverage of compression is per DME provider and typically Medicare and Medicare group plans may not cover cost beyond pair of standard sized knee high garments.   Commitment to attendance as well as commitment to securing compression needs is critical to edema management.     Patient Education and Home Exercises      Education provided:   1. Educated on cause of lymphedema.  2. Explained the Complete Decongestive Therapy protocol in depth  3. Educated on Phase 1 and 2 of protocol.  4. Reviewed treatment frequency and likely duration of weeks  5. Precautions and contraindications for treatment.  6. Plan of care and goals.  7. Educated on home management protocols.     Written Home Compression Program Provided:  Patient encouraged to wear knee high compression from first waking until back in bed.  Off for bathing and sleeping only .   Amanda demonstrated good  understanding of the compression program provided.      Assessment       Amanda James is a 72 y.o. female referred to outpatient occupational therapy and presents with a medical diagnosis of lymphedema secondary to venous insuffiency. Lymphedema, left untreated increases risk of infection, gait deviation causing ortho problems and poor body image. Patient presents with the following therapy deficits: lymphedema of bilateral lower limbs and demonstrates limitations as described in the chart below. Following medical record review it is determined that pt will benefit from complete decongestive therapy services for the treatment and management of this chronic condition. The following goals were discussed with the patient  and patient is in agreement with them as to be addressed in the treatment plan. The patient's rehab potential is Excellent.       Amanda is progressing well towards her goals and there are no updates to goals at this time. Pt prognosis is Excellent.     Pt will continue to benefit from skilled outpatient occupational therapy to address the deficits listed in the problem list on initial evaluation provide pt/family education and to maximize pt's level of independence in the home and community environment.     Pt's spiritual, cultural and educational needs considered and pt agreeable to plan of care and goals.    Anticipated barriers to occupational therapy: none       Goals:   Short Term Goals for 3 weeks: (phase 1 of protocol)  1. Patient and/or caregiver will demonstrate understanding of lymphedema precautions to decrease the risk of infection and lymphedema exacerbation. - Ongoing 12/27/2022   2. Patient will tolerate daily activities wearing compression knee highs- Ongoing 12/27/2022   3. Patient and/or caregiver will order/obtain appropriate compression garments- met 12/27/2022  4. Patient will experience a decrease in girth of affected areas of 1.0 cm- Ongoing 12/27/2022      Long Term Goals for 6 weeks: (Phase 2 of goals)  1. Patient and/or caregiver will be independent with donning and doffing of compression garments.  2. Patient and/or caregiver will be independent with HEP and lymphedema management to help prevent edema relapse and reduce risk of infection.  4. Patient will experience a decreased in girth of affected areas of 2.0 cm  PLAN     Continue plan of care 2 times weekly for 6 weeks to include the following interventions: Edema Control.    Yue Zamorano OT, JUAN R

## 2023-01-05 ENCOUNTER — CLINICAL SUPPORT (OUTPATIENT)
Dept: REHABILITATION | Facility: HOSPITAL | Age: 73
End: 2023-01-05
Payer: MEDICARE

## 2023-01-05 DIAGNOSIS — I87.2 VENOUS INSUFFICIENCY OF BOTH LOWER EXTREMITIES: Primary | ICD-10-CM

## 2023-01-05 DIAGNOSIS — I89.0 LYMPHEDEMA OF BOTH LOWER EXTREMITIES: ICD-10-CM

## 2023-01-05 DIAGNOSIS — R60.0 EDEMA OF BOTH LOWER EXTREMITIES: ICD-10-CM

## 2023-01-05 PROCEDURE — 97140 MANUAL THERAPY 1/> REGIONS: CPT

## 2023-01-06 ENCOUNTER — TELEPHONE (OUTPATIENT)
Dept: CARDIOLOGY | Facility: CLINIC | Age: 73
End: 2023-01-06
Payer: MEDICARE

## 2023-01-12 ENCOUNTER — TELEPHONE (OUTPATIENT)
Dept: INTERNAL MEDICINE | Facility: CLINIC | Age: 73
End: 2023-01-12
Payer: MEDICARE

## 2023-01-12 DIAGNOSIS — I10 HYPERTENSION, UNSPECIFIED TYPE: Primary | ICD-10-CM

## 2023-01-12 NOTE — TELEPHONE ENCOUNTER
Contacted and explain the htn program   Will fill out forms and sing them to start the program    instructed call for any blood pressure issues   Ad will follow on 1/24

## 2023-01-12 NOTE — TELEPHONE ENCOUNTER
----- Message from Elana Modi sent at 1/12/2023 11:46 AM CST -----  Contact: pt [phone]  Stated that a digital BP machine was supposed to be sent to her but has not been received yet.    Please call and advise.    Thank You

## 2023-01-12 NOTE — TELEPHONE ENCOUNTER
Are you referring to   Digital  HTN (please put in orders)  Or did you want to send a BP monitor  to the pharmacy  Please advise

## 2023-01-12 NOTE — PROGRESS NOTES
[facility]  Occupational Therapy Treatment Note    Date: 1/5/2023  Name: Amanda James  Clinic Number: [id number]    Time In: 9:00  Time Out: 10:00  Total Billable Time: 60 minutes    Therapy Diagnosis:   Encounter Diagnoses   Name Primary?    Venous insufficiency of both lower extremities Yes    Lymphedema of both lower extremities     Edema of both lower extremities      Physician: Giuseppe Lang MD*  Physician Orders: OT Eval and Treat    Evaluation Date: 12/27/22  Insurance Authorization Period Expiration: 1/2/2023-2/28/2023  Plan of Care Expiration: 2/7/2023  Visit # / Visits authorized: 2 / 20    Precautions:  Standard and Diabetes    SUBJECTIVE     Pt reports: she started wearing the compression knee high socks that she had at home, and notices an improvement already in the legs, especially the L one. Patient asked for sizing for purchase of a new compression sock.  Size chart was compared to her measurements of ankle 29cm, and calf 45cm and according to the posted chart, a XL will be the best fit.  She was compliant with home compression program given last session.   Response to previous treatment: first after visit today  Functional change: n/a    Pain: 0/10  Location: bilateral lower legs     OBJECTIVE     Pt arrived wearing a dress, and brought her knee high compression socks.    Objective Measures updated at progress report unless specified.    Treatment     Amanda received the treatments listed below:     Manual therapy techniques were applied for 60 minutes, including:    MANUAL LYMPHATIC DRAINAGE (MLD):    While supine with LEs elevated stimulation at terminus, along GI region, B inguinal regions, drainage of entire L LE zac lower leg, ankle, and foot with return proximally.  The thigh is very soft with no pitting edema noted. The lower leg has mild pitting throughout. Deep thumb [place] techniques were performed throughout the lower legs to break down the pitting  edema. There is no pitting edema found over the foot.  All fluid was then shunted back up through the lateral leg and the abdominal and inguinal nodes were treated again.    MULTILAYERED BANDAGING:  patient declined short stretch bandages and requested use of her knee high compression socks. Therapist donned the socks to BLE.  The socks appear to be well fitting.  Wash and wear schedules confirmed.     Pt was educated in potential compression needs.     Discussed wear schedule, don/doff, wash and management of socks  Size and compression class and AM/PM needs.      Informed insurance coverage of compression is per DME provider and typically Medicare and Medicare group plans may not cover cost beyond pair of standard sized knee high garments.   Commitment to attendance as well as commitment to securing compression needs is critical to edema management.     Patient Education and Home Exercises      Education provided:   1. Educated on cause of lymphedema.  2. Explained the Complete Decongestive Therapy protocol in depth  3. Educated on Phase 1 and 2 of protocol.  4. Reviewed treatment frequency and likely duration of weeks  5. Precautions and contraindications for treatment.  6. Plan of care and goals.  7. Educated on home management protocols.     Written Home Compression Program Provided:  Patient encouraged to wear knee high compression from first waking until back in bed.  Off for bathing and sleeping only .   Amanda demonstrated good  understanding of the compression program provided.      Assessment       Amanda James is a 72 y.o. female referred to outpatient occupational therapy and presents with a medical diagnosis of lymphedema secondary to venous insuffiency. Lymphedema, left untreated increases risk of infection, gait deviation causing ortho problems and poor body image. Patient presents with the following therapy deficits: lymphedema of bilateral lower limbs and demonstrates limitations as described in  the chart below. Following medical record review it is determined that pt will benefit from complete decongestive therapy services for the treatment and management of this chronic condition. The following goals were discussed with the patient and patient is in agreement with them as to be addressed in the treatment plan. The patient's rehab potential is Excellent.       Amanda is progressing well towards her goals and there are no updates to goals at this time. Pt prognosis is Excellent.     Pt will continue to benefit from skilled outpatient occupational therapy to address the deficits listed in the problem list on initial evaluation provide pt/family education and to maximize pt's level of independence in the home and community environment.     Pt's spiritual, cultural and educational needs considered and pt agreeable to plan of care and goals.    Anticipated barriers to occupational therapy: none       Goals:   Short Term Goals for 3 weeks: (phase 1 of protocol)  1. Patient and/or caregiver will demonstrate understanding of lymphedema precautions to decrease the risk of infection and lymphedema exacerbation. - Ongoing 1/5/2023  2. Patient will tolerate daily activities wearing compression knee highs- Ongoing 1/5/2023  3. Patient and/or caregiver will order/obtain appropriate compression garments- met 1/5/2023  4. Patient will experience a decrease in girth of affected areas of 1.0 cm- Ongoing 1/5/2023     Long Term Goals for 6 weeks: (Phase 2 of goals)  1. Patient and/or caregiver will be independent with donning and doffing of compression garments.  2. Patient and/or caregiver will be independent with HEP and lymphedema management to help prevent edema relapse and reduce risk of infection.  4. Patient will experience a decreased in girth of affected areas of 2.0 cm  PLAN     Patient states rides to treatment are difficult. Patient will continue to wear compression knee high stockings at home. If able to get a  "ride, Continue plan of care 2 times weekly for 6 weeks to include the following interventions: Edema Control. If unable, patient will return for a "check up" in one month.    Yue Zamorano OT, JUAN R      "

## 2023-01-13 ENCOUNTER — TELEPHONE (OUTPATIENT)
Dept: INTERNAL MEDICINE | Facility: CLINIC | Age: 73
End: 2023-01-13
Payer: MEDICARE

## 2023-01-13 ENCOUNTER — TELEPHONE (OUTPATIENT)
Dept: SLEEP MEDICINE | Facility: OTHER | Age: 73
End: 2023-01-13
Payer: MEDICARE

## 2023-01-13 NOTE — TELEPHONE ENCOUNTER
----- Message from Louise Olvera MA sent at 1/13/2023  4:22 PM CST -----  Contact: [phone]    ----- Message -----  From: Micaela Robb  Sent: 1/13/2023   3:16 PM CST  To: Lopez ALEXANDER Staff    Pt is calling for the nurse she states she is needing to speak to you in regards to the blood pressure thingy she called it you all spoke about please give return call

## 2023-01-17 ENCOUNTER — OFFICE VISIT (OUTPATIENT)
Dept: CARDIOLOGY | Facility: CLINIC | Age: 73
End: 2023-01-17
Payer: MEDICARE

## 2023-01-17 VITALS
WEIGHT: 268.94 LBS | HEIGHT: 65 IN | BODY MASS INDEX: 44.81 KG/M2 | HEART RATE: 70 BPM | DIASTOLIC BLOOD PRESSURE: 76 MMHG | SYSTOLIC BLOOD PRESSURE: 122 MMHG

## 2023-01-17 DIAGNOSIS — I10 PRIMARY HYPERTENSION: Chronic | ICD-10-CM

## 2023-01-17 DIAGNOSIS — I87.2 VENOUS REFLUX: ICD-10-CM

## 2023-01-17 DIAGNOSIS — E78.2 MIXED HYPERLIPIDEMIA: ICD-10-CM

## 2023-01-17 DIAGNOSIS — I87.2 VENOUS INSUFFICIENCY OF BOTH LOWER EXTREMITIES: ICD-10-CM

## 2023-01-17 DIAGNOSIS — R60.0 EDEMA OF BOTH LOWER EXTREMITIES: Primary | ICD-10-CM

## 2023-01-17 DIAGNOSIS — I89.0 LYMPHEDEMA OF BOTH LOWER EXTREMITIES: ICD-10-CM

## 2023-01-17 DIAGNOSIS — E66.01 SEVERE OBESITY (BMI >= 40): ICD-10-CM

## 2023-01-17 PROCEDURE — 3008F BODY MASS INDEX DOCD: CPT | Mod: CPTII,S$GLB,, | Performed by: INTERNAL MEDICINE

## 2023-01-17 PROCEDURE — 99215 OFFICE O/P EST HI 40 MIN: CPT | Mod: S$GLB,,, | Performed by: INTERNAL MEDICINE

## 2023-01-17 PROCEDURE — 99215 PR OFFICE/OUTPT VISIT, EST, LEVL V, 40-54 MIN: ICD-10-PCS | Mod: S$GLB,,, | Performed by: INTERNAL MEDICINE

## 2023-01-17 PROCEDURE — 1126F PR PAIN SEVERITY QUANTIFIED, NO PAIN PRESENT: ICD-10-PCS | Mod: CPTII,S$GLB,, | Performed by: INTERNAL MEDICINE

## 2023-01-17 PROCEDURE — 3008F PR BODY MASS INDEX (BMI) DOCUMENTED: ICD-10-PCS | Mod: CPTII,S$GLB,, | Performed by: INTERNAL MEDICINE

## 2023-01-17 PROCEDURE — 3074F SYST BP LT 130 MM HG: CPT | Mod: CPTII,S$GLB,, | Performed by: INTERNAL MEDICINE

## 2023-01-17 PROCEDURE — 1159F PR MEDICATION LIST DOCUMENTED IN MEDICAL RECORD: ICD-10-PCS | Mod: CPTII,S$GLB,, | Performed by: INTERNAL MEDICINE

## 2023-01-17 PROCEDURE — 3074F PR MOST RECENT SYSTOLIC BLOOD PRESSURE < 130 MM HG: ICD-10-PCS | Mod: CPTII,S$GLB,, | Performed by: INTERNAL MEDICINE

## 2023-01-17 PROCEDURE — 1159F MED LIST DOCD IN RCRD: CPT | Mod: CPTII,S$GLB,, | Performed by: INTERNAL MEDICINE

## 2023-01-17 PROCEDURE — 3078F DIAST BP <80 MM HG: CPT | Mod: CPTII,S$GLB,, | Performed by: INTERNAL MEDICINE

## 2023-01-17 PROCEDURE — 1101F PT FALLS ASSESS-DOCD LE1/YR: CPT | Mod: CPTII,S$GLB,, | Performed by: INTERNAL MEDICINE

## 2023-01-17 PROCEDURE — 3288F PR FALLS RISK ASSESSMENT DOCUMENTED: ICD-10-PCS | Mod: CPTII,S$GLB,, | Performed by: INTERNAL MEDICINE

## 2023-01-17 PROCEDURE — 99999 PR PBB SHADOW E&M-EST. PATIENT-LVL IV: ICD-10-PCS | Mod: PBBFAC,,, | Performed by: INTERNAL MEDICINE

## 2023-01-17 PROCEDURE — 99999 PR PBB SHADOW E&M-EST. PATIENT-LVL IV: CPT | Mod: PBBFAC,,, | Performed by: INTERNAL MEDICINE

## 2023-01-17 PROCEDURE — 3078F PR MOST RECENT DIASTOLIC BLOOD PRESSURE < 80 MM HG: ICD-10-PCS | Mod: CPTII,S$GLB,, | Performed by: INTERNAL MEDICINE

## 2023-01-17 PROCEDURE — 3288F FALL RISK ASSESSMENT DOCD: CPT | Mod: CPTII,S$GLB,, | Performed by: INTERNAL MEDICINE

## 2023-01-17 PROCEDURE — 1101F PR PT FALLS ASSESS DOC 0-1 FALLS W/OUT INJ PAST YR: ICD-10-PCS | Mod: CPTII,S$GLB,, | Performed by: INTERNAL MEDICINE

## 2023-01-17 PROCEDURE — 1126F AMNT PAIN NOTED NONE PRSNT: CPT | Mod: CPTII,S$GLB,, | Performed by: INTERNAL MEDICINE

## 2023-01-17 NOTE — PATIENT INSTRUCTIONS
Assessment/Plan:  Amanda James is a 73 y.o. female with BLE lymphedema, venous insufficiency, HTN, HLD, IZABEL (not on CPAP), morbid obesity, who presents for a follow up appointment.    BLE Lymphedema and Venous Reflux- Mrs. James reports significant improvement in BLE edema since starting lymphedema clinic therapy.  Continue lymphedema clinic therapy.  Continue bumex 0.5 mg daily.  Pt to limit sodium intake to 2,000 mg daily.  Limit volume intake to 1.5 liters daily.  Elevate legs when resting.    2. HTN- Continue current medications.     3. HLD- Pt states she did not start atorvastatin 40 mg daily as prescribed.   on 10/7/2022.  Pt encourage to start atorvastatin 40 mg daily as prescribed.        4. Morbid Obesity- Encourage diet, exercise and weight loss.     Follow up in 6 months with lipids prior

## 2023-01-17 NOTE — PROGRESS NOTES
[facility]      Chief Complaint   Patient presents with    Hypertension    lymphedema    Edema    Venous insufficiency       Patient ID: Amanda James is a 73 y.o. female with BLE lymphedema, venous insufficiency, HTN, HLD, IZABEL (not on CPAP), morbid obesity, who presents for a follow up appointment.  Pertinent history/events are as follows:    -Pt kindly referred by Dr. Metcalf for evaluation of Venous reflux.    -At our initial clinic visit on 9/15/2022, Mrs. James reports leg swelling for several years.  She hs no claudication or tissue loss.  No smoking history.  BLE Venous Reflux Study on 8/2/2022 revealed hemodynamically significant venous reflux bilaterally with no evidence of DVT.   Plan:   BLE Lymphedema and Venous Reflux- Refer to lymphedema clinic.  Start bumex 0.5 mg daily.  Check  cmp in 1 week.  Pt to limit sodium intake to 2,000 mg daily.  Limit volume intake to 1.5 liters daily.  Elevate legs when resting.  HTN- Continue current medications.   HLD- Continue updated lipid panel.     Morbid Obesity- Encourage diet, exercise and weight loss.     HPI:  Mrs. James reports significant improvement in BLE edema since starting lymphedema clinic therapy.  Pt states she did not start atorvastatin 40 mg daily as prescribed.   on 10/7/2022.      Past Medical History:   Diagnosis Date    Allergy     Asthma     Hyperlipidemia     Hypertension     IZABEL (obstructive sleep apnea)     Pre-diabetes      Past Surgical History:   Procedure Laterality Date    BREAST BIOPSY Left     benign    BREAST LUMPECTOMY Left     COLONOSCOPY N/A 8/14/2019    Procedure: COLONOSCOPY;  Surgeon: Clinton Gottlieb MD;  Location: [address]);  Service: Endoscopy;  Laterality: N/A;  BMI 47.45  IZABEL    ESOPHAGOGASTRODUODENOSCOPY N/A 8/14/2019    Procedure: EGD (ESOPHAGOGASTRODUODENOSCOPY);  Surgeon: Clinton Gottlieb MD;  Location: [address]);  Service: Endoscopy;  Laterality: N/A;  BMI 47.45  IZABEL     HYSTERECTOMY      PARTIAL HYSTERECTOMY      uterus removed     Social History     Socioeconomic History    Marital status: Single   Tobacco Use    Smoking status: Former     Types: Cigarettes     Quit date: 1999     Years since quittin.6    Smokeless tobacco: Never   Substance and Sexual Activity    Alcohol use: Yes     Comment: socially/ rare    Drug use: No    Sexual activity: Not Currently     Family History   Problem Relation Age of Onset    Hypertension Mother     Coronary artery disease Father     Coronary artery disease Brother     Breast cancer Paternal Aunt     Colon cancer Neg Hx     Inflammatory bowel disease Neg Hx     Crohn's disease Neg Hx     Ulcerative colitis Neg Hx        Review of patient's allergies indicates:   Allergen Reactions    Aspirin Other (See Comments)       Medication List with Changes/Refills   Current Medications    ATORVASTATIN (LIPITOR) 40 MG TABLET    Take 1 tablet (40 mg total) by mouth once daily.    BUMETANIDE (BUMEX) 0.5 MG TAB    Take 1 tablet (0.5 mg total) by mouth once daily.    CHOLECALCIFEROL, VITAMIN D3, 50,000 UNIT CAPSULE    TK 1 C PO WEEKLY    DICLOFENAC SODIUM (VOLTAREN) 1 % GEL    Apply 2 g topically 3 (three) times daily as needed (Muscle joint pain).    ERGOCALCIFEROL (ERGOCALCIFEROL) 50,000 UNIT CAP    TAKE 1 CAPSULE EVERY 7 DAYS    FERROUS SULFATE (FEROSUL) 325 MG (65 MG IRON) TAB TABLET    Take 1 tablet (325 mg total) by mouth once daily.    FLUOXETINE 10 MG CAPSULE    TAKE 2 CAPSULES(20 MG) BY MOUTH EVERY DAY    FLUTICASONE PROPIONATE (FLONASE) 50 MCG/ACTUATION NASAL SPRAY    1 spray (50 mcg total) by Each Nostril route once daily.    LIDOCAINE (LIDODERM) 5 %    Place 1 patch onto the skin once daily. Remove & Discard patch within 12 hours or as directed by MD    LIDOCAINE HCL 2% (XYLOCAINE) 2 % JELLY    Apply topically as needed. Apply topically once nightly to affected part of foot/feet.    MONTELUKAST (SINGULAIR) 10 MG TABLET    Take 1 tablet (10  "mg total) by mouth once daily.    MULTIVIT-MIN-IRON-FA-LUTEIN (CENTRUM SILVER WOMEN) 8 MG IRON-400 MCG-300 MCG TAB    Take 1 tablet by mouth once daily.    OLMESARTAN-HYDROCHLOROTHIAZIDE (BENICAR HCT) 40-12.5 MG TAB    Take one tab po daily       Review of Systems  Constitution: Denies chills, fever, and sweats.  HENT: Denies headaches or blurry vision.  Cardiovascular: Denies chest pain or irregular heart beat.  Respiratory: Denies cough or shortness of breath.  Gastrointestinal: Denies abdominal pain, nausea, or vomiting.  Musculoskeletal: Positive for leg swelling.  Neurological: Denies dizziness or focal weakness.  Psychiatric/Behavioral: Normal mental status.  Hematologic/Lymphatic: Denies bleeding problem or easy bruising/bleeding.  Skin: Denies rash or suspicious lesions    Physical Examination  /76   Pulse 70   Ht 5' 5" (1.651 m)   Wt 122 kg (268 lb 15.4 oz)   LMP  (LMP Unknown)   BMI 44.76 kg/m²     Constitutional: No acute distress, conversant  HEENT: Sclera anicteric, Pupils equal, round and reactive to light, extraocular motions intact, Oropharynx clear  Neck: No JVD, no carotid bruits  Cardiovascular: regular rate and rhythm, no murmur, rubs or gallops, normal S1/S2  Pulmonary: Clear to auscultation bilaterally  Abdominal: Abdomen soft, nontender, nondistended, positive bowel sounds  Extremities: BLE's with 1+ pitting edema, venous stasis dermatitis, and changes consistent with lymphedema (L>R)  Pulses:  Carotid pulses are 2+ on the right side, and 2+ on the left side.  Radial pulses are 2+ on the right side, and 2+ on the left side.   Femoral pulses are 2+ on the right side, and 2+ on the left side.  Popliteal pulses are 2+ on the right side, and 2+ on the left side.   Dorsalis pedis pulses are 2+ on the right side, and 2+ on the left side.   Posterior tibial pulses are 2+ on the right side, and 2+ on the left side.    Skin: No ecchymosis, erythema, or ulcers  Psych: Alert and oriented x 3, " appropriate affect  Neuro: CNII-XII intact, no focal deficits    Labs:  Most Recent Data  CBC:   Lab Results   Component Value Date    WBC 8.71 10/07/2022    HGB 13.6 10/07/2022    HCT 44.1 10/07/2022     10/07/2022    MCV 98 10/07/2022    RDW 13.4 10/07/2022     BMP:   Lab Results   Component Value Date     10/07/2022    K 4.3 10/07/2022     10/07/2022    CO2 29 10/07/2022    BUN 28 (H) 10/07/2022    CREATININE 1.2 10/07/2022     10/07/2022    CALCIUM 10.0 10/07/2022    MG 1.9 07/27/2019    PHOS 2.8 07/27/2019     LFTS;   Lab Results   Component Value Date    PROT 6.9 10/07/2022    ALBUMIN 3.4 (L) 10/07/2022    BILITOT 0.7 10/07/2022    AST 18 10/07/2022    ALKPHOS 85 10/07/2022    ALT 26 10/07/2022     COAGS: No results found for: INR, PROTIME, PTT  FLP:   Lab Results   Component Value Date    CHOL 238 (H) 10/07/2022    HDL 42 10/07/2022    LDLCALC 176.6 (H) 10/07/2022    TRIG 97 10/07/2022    CHOLHDL 17.6 (L) 10/07/2022     CARDIAC:   Lab Results   Component Value Date    TROPONINI <0.006 10/07/2021       Imaging:    BLE Venous Reflux Study 8/2/2022:  No DVT seen.   Reflux bilaterally.    Assessment/Plan:  Amanda James is a 73 y.o. female with BLE lymphedema, venous insufficiency, HTN, HLD, IZABEL (not on CPAP), morbid obesity, who presents for a follow up appointment.    BLE Lymphedema and Venous Reflux- Mrs. James reports significant improvement in BLE edema since starting lymphedema clinic therapy.  Continue lymphedema clinic therapy.  Continue bumex 0.5 mg daily.  Pt to limit sodium intake to 2,000 mg daily.  Limit volume intake to 1.5 liters daily.  Elevate legs when resting.    2. HTN- Continue current medications.     3. HLD- Pt states she did not start atorvastatin 40 mg daily as prescribed.   on 10/7/2022.  Pt encourage to start atorvastatin 40 mg daily as prescribed.        4. Morbid Obesity- Encourage diet, exercise and weight loss.     Follow up in 6 months with  lipids prior    Total duration of face to face visit time 30 minutes.  Total time spent counseling greater than fifty percent of total visit time.  Counseling included discussion regarding imaging findings, diagnosis, possibilities, treatment options, risks and benefits.  The patient had many questions regarding the options and long-term effects.    Giuseppe Lang MD, PhD  Interventional Cardiology      Follow up addendum 12/6/2022:  Mrs James has been compliant with compression of 20-30mmHg, elevation, and exercise for at least 4 weeks yet symptoms persists.

## 2023-01-24 ENCOUNTER — OFFICE VISIT (OUTPATIENT)
Dept: INTERNAL MEDICINE | Facility: CLINIC | Age: 73
End: 2023-01-24
Payer: MEDICARE

## 2023-01-24 VITALS
HEART RATE: 75 BPM | HEIGHT: 65 IN | OXYGEN SATURATION: 96 % | BODY MASS INDEX: 44.11 KG/M2 | SYSTOLIC BLOOD PRESSURE: 112 MMHG | DIASTOLIC BLOOD PRESSURE: 82 MMHG | WEIGHT: 264.75 LBS

## 2023-01-24 DIAGNOSIS — I12.9 HYPERTENSIVE CHRONIC KIDNEY DISEASE WITH STAGE 1 THROUGH STAGE 4 CHRONIC KIDNEY DISEASE, OR UNSPECIFIED CHRONIC KIDNEY DISEASE: ICD-10-CM

## 2023-01-24 DIAGNOSIS — I89.0 LYMPHEDEMA OF BOTH LOWER EXTREMITIES: ICD-10-CM

## 2023-01-24 DIAGNOSIS — E66.01 SEVERE OBESITY (BMI >= 40): Primary | ICD-10-CM

## 2023-01-24 DIAGNOSIS — D50.8 OTHER IRON DEFICIENCY ANEMIA: ICD-10-CM

## 2023-01-24 DIAGNOSIS — I10 PRIMARY HYPERTENSION: Chronic | ICD-10-CM

## 2023-01-24 DIAGNOSIS — I87.2 VENOUS INSUFFICIENCY OF BOTH LOWER EXTREMITIES: ICD-10-CM

## 2023-01-24 DIAGNOSIS — R73.03 PRE-DIABETES: ICD-10-CM

## 2023-01-24 DIAGNOSIS — E78.2 MIXED HYPERLIPIDEMIA: ICD-10-CM

## 2023-01-24 PROCEDURE — 3079F PR MOST RECENT DIASTOLIC BLOOD PRESSURE 80-89 MM HG: ICD-10-PCS | Mod: CPTII,GC,S$GLB, | Performed by: STUDENT IN AN ORGANIZED HEALTH CARE EDUCATION/TRAINING PROGRAM

## 2023-01-24 PROCEDURE — 3288F PR FALLS RISK ASSESSMENT DOCUMENTED: ICD-10-PCS | Mod: CPTII,GC,S$GLB, | Performed by: STUDENT IN AN ORGANIZED HEALTH CARE EDUCATION/TRAINING PROGRAM

## 2023-01-24 PROCEDURE — 1125F PR PAIN SEVERITY QUANTIFIED, PAIN PRESENT: ICD-10-PCS | Mod: CPTII,GC,S$GLB, | Performed by: STUDENT IN AN ORGANIZED HEALTH CARE EDUCATION/TRAINING PROGRAM

## 2023-01-24 PROCEDURE — 99213 PR OFFICE/OUTPT VISIT, EST, LEVL III, 20-29 MIN: ICD-10-PCS | Mod: GC,S$GLB,, | Performed by: STUDENT IN AN ORGANIZED HEALTH CARE EDUCATION/TRAINING PROGRAM

## 2023-01-24 PROCEDURE — 3079F DIAST BP 80-89 MM HG: CPT | Mod: CPTII,GC,S$GLB, | Performed by: STUDENT IN AN ORGANIZED HEALTH CARE EDUCATION/TRAINING PROGRAM

## 2023-01-24 PROCEDURE — 1125F AMNT PAIN NOTED PAIN PRSNT: CPT | Mod: CPTII,GC,S$GLB, | Performed by: STUDENT IN AN ORGANIZED HEALTH CARE EDUCATION/TRAINING PROGRAM

## 2023-01-24 PROCEDURE — 99999 PR PBB SHADOW E&M-EST. PATIENT-LVL V: ICD-10-PCS | Mod: PBBFAC,GC,, | Performed by: STUDENT IN AN ORGANIZED HEALTH CARE EDUCATION/TRAINING PROGRAM

## 2023-01-24 PROCEDURE — 99999 PR PBB SHADOW E&M-EST. PATIENT-LVL V: CPT | Mod: PBBFAC,GC,, | Performed by: STUDENT IN AN ORGANIZED HEALTH CARE EDUCATION/TRAINING PROGRAM

## 2023-01-24 PROCEDURE — 3074F PR MOST RECENT SYSTOLIC BLOOD PRESSURE < 130 MM HG: ICD-10-PCS | Mod: CPTII,GC,S$GLB, | Performed by: STUDENT IN AN ORGANIZED HEALTH CARE EDUCATION/TRAINING PROGRAM

## 2023-01-24 PROCEDURE — 99213 OFFICE O/P EST LOW 20 MIN: CPT | Mod: GC,S$GLB,, | Performed by: STUDENT IN AN ORGANIZED HEALTH CARE EDUCATION/TRAINING PROGRAM

## 2023-01-24 PROCEDURE — 3008F BODY MASS INDEX DOCD: CPT | Mod: CPTII,GC,S$GLB, | Performed by: STUDENT IN AN ORGANIZED HEALTH CARE EDUCATION/TRAINING PROGRAM

## 2023-01-24 PROCEDURE — 1101F PT FALLS ASSESS-DOCD LE1/YR: CPT | Mod: CPTII,GC,S$GLB, | Performed by: STUDENT IN AN ORGANIZED HEALTH CARE EDUCATION/TRAINING PROGRAM

## 2023-01-24 PROCEDURE — 1101F PR PT FALLS ASSESS DOC 0-1 FALLS W/OUT INJ PAST YR: ICD-10-PCS | Mod: CPTII,GC,S$GLB, | Performed by: STUDENT IN AN ORGANIZED HEALTH CARE EDUCATION/TRAINING PROGRAM

## 2023-01-24 PROCEDURE — 3074F SYST BP LT 130 MM HG: CPT | Mod: CPTII,GC,S$GLB, | Performed by: STUDENT IN AN ORGANIZED HEALTH CARE EDUCATION/TRAINING PROGRAM

## 2023-01-24 PROCEDURE — 3288F FALL RISK ASSESSMENT DOCD: CPT | Mod: CPTII,GC,S$GLB, | Performed by: STUDENT IN AN ORGANIZED HEALTH CARE EDUCATION/TRAINING PROGRAM

## 2023-01-24 PROCEDURE — 3008F PR BODY MASS INDEX (BMI) DOCUMENTED: ICD-10-PCS | Mod: CPTII,GC,S$GLB, | Performed by: STUDENT IN AN ORGANIZED HEALTH CARE EDUCATION/TRAINING PROGRAM

## 2023-01-24 NOTE — PROGRESS NOTES
Clinic Note  1/24/2023      Subjective:       Patient ID:  Amanda is a 73 y.o. female being seen for an established visit.    Chief Complaint: Follow-up      Ms. James is a 72 F with PMHx of HTN, venous insufficency, LIANE, prediabetes, Obesity, IZABEL who presents to clinic for follow up.     HTN  Meds: olmesartan-HCTZ 40-12.5mg qd  Does not record home BP measurements   Does not exercise, inconsistent diet     LIANE  Well managed with PO iron 325mg qd  Hospitalization in 2019 with acute drop in CBC; EDG and C scope negative for source of bleeding. Stable since that time.   C scope revealed polyp with negative pathology - next due in 2026    IZABEL  Not compliant with CPAP due to machine malfunction.   Does not follow with sleep medicine     PreDM  Last Hgb 6.2     Venous Insuffiencey   LE US showed b/l reflux   Complains of LE swelling over several months   Follows with vascular medicine (Dr. Lang)     Obesity   Inconsistent diet  Does not exercise       Follow-up  Pertinent negatives include no abdominal pain, chest pain, chills, coughing, diaphoresis, fever, headaches, myalgias, nausea, sore throat, vomiting or weakness.     Review of Systems   Constitutional:  Negative for chills, diaphoresis, fever, malaise/fatigue and weight loss.   HENT:  Negative for sore throat.    Eyes:  Negative for blurred vision.   Respiratory:  Negative for cough and shortness of breath.    Cardiovascular:  Positive for leg swelling. Negative for chest pain, palpitations and PND.   Gastrointestinal:  Negative for abdominal pain, constipation, diarrhea, heartburn, nausea and vomiting.   Genitourinary:  Negative for dysuria.   Musculoskeletal:  Negative for joint pain and myalgias.   Neurological:  Negative for tingling, weakness and headaches.   Endo/Heme/Allergies:  Negative for environmental allergies.   Psychiatric/Behavioral:  Negative for depression. The patient does not have insomnia.      Past Medical History:   Diagnosis Date     Allergy     Asthma     Hyperlipidemia     Hypertension     IZABEL (obstructive sleep apnea)     Pre-diabetes        Family History   Problem Relation Age of Onset    Hypertension Mother     Coronary artery disease Father     Coronary artery disease Brother     Breast cancer Paternal Aunt     Colon cancer Neg Hx     Inflammatory bowel disease Neg Hx     Crohn's disease Neg Hx     Ulcerative colitis Neg Hx         reports that she quit smoking about 23 years ago. Her smoking use included cigarettes. She has never used smokeless tobacco. She reports current alcohol use. She reports that she does not use drugs.    Medication List with Changes/Refills   Current Medications    ATORVASTATIN (LIPITOR) 40 MG TABLET    Take 1 tablet (40 mg total) by mouth once daily.    BUMETANIDE (BUMEX) 0.5 MG TAB    Take 1 tablet (0.5 mg total) by mouth once daily.    CHOLECALCIFEROL, VITAMIN D3, 50,000 UNIT CAPSULE    TK 1 C PO WEEKLY    DICLOFENAC SODIUM (VOLTAREN) 1 % GEL    Apply 2 g topically 3 (three) times daily as needed (Muscle joint pain).    ERGOCALCIFEROL (ERGOCALCIFEROL) 50,000 UNIT CAP    TAKE 1 CAPSULE EVERY 7 DAYS    FERROUS SULFATE (FEROSUL) 325 MG (65 MG IRON) TAB TABLET    Take 1 tablet (325 mg total) by mouth once daily.    FLUOXETINE 10 MG CAPSULE    TAKE 2 CAPSULES(20 MG) BY MOUTH EVERY DAY    FLUTICASONE PROPIONATE (FLONASE) 50 MCG/ACTUATION NASAL SPRAY    1 spray (50 mcg total) by Each Nostril route once daily.    LIDOCAINE (LIDODERM) 5 %    Place 1 patch onto the skin once daily. Remove & Discard patch within 12 hours or as directed by MD    LIDOCAINE HCL 2% (XYLOCAINE) 2 % JELLY    Apply topically as needed. Apply topically once nightly to affected part of foot/feet.    MONTELUKAST (SINGULAIR) 10 MG TABLET    Take 1 tablet (10 mg total) by mouth once daily.    MULTIVIT-MIN-IRON-FA-LUTEIN (CENTRUM SILVER WOMEN) 8 MG IRON-400 MCG-300 MCG TAB    Take 1 tablet by mouth once daily.    OLMESARTAN-HYDROCHLOROTHIAZIDE  "(BENICAR HCT) 40-12.5 MG TAB    Take one tab po daily     Review of patient's allergies indicates:   Allergen Reactions    Aspirin Other (See Comments)       Patient Active Problem List   Diagnosis    HTN (hypertension)    Pre-diabetes    Severe obesity (BMI >= 40)    Vitamin D deficiency    Iron deficiency anemia    History of gastrointestinal ulcer    Anemia    Venous reflux    Lymphedema of both lower extremities    Venous insufficiency of both lower extremities    Edema of both lower extremities    Mixed hyperlipidemia           Objective:      /82 (BP Location: Left arm, Patient Position: Sitting, BP Method: Large (Manual))   Pulse 75   Ht 5' 5" (1.651 m)   Wt 120.1 kg (264 lb 12.4 oz)   LMP  (LMP Unknown)   SpO2 96%   BMI 44.06 kg/m²   Estimated body mass index is 44.06 kg/m² as calculated from the following:    Height as of this encounter: 5' 5" (1.651 m).    Weight as of this encounter: 120.1 kg (264 lb 12.4 oz).  Physical Exam  Constitutional:       General: She is not in acute distress.     Appearance: She is obese. She is not diaphoretic.   HENT:      Head: Normocephalic and atraumatic.      Nose: Nose normal.      Mouth/Throat:      Mouth: Mucous membranes are moist.   Eyes:      General: No scleral icterus.     Extraocular Movements: Extraocular movements intact.      Conjunctiva/sclera: Conjunctivae normal.   Neck:      Thyroid: No thyromegaly.   Cardiovascular:      Rate and Rhythm: Normal rate and regular rhythm.      Pulses: Normal pulses.      Heart sounds: Normal heart sounds.   Pulmonary:      Effort: Pulmonary effort is normal.      Breath sounds: Normal breath sounds. No wheezing or rales.   Abdominal:      General: Bowel sounds are normal. There is no distension.      Palpations: Abdomen is soft. There is no mass.      Tenderness: There is no abdominal tenderness.   Musculoskeletal:         General: Normal range of motion.      Cervical back: Normal range of motion.      Right " lower leg: Edema present.      Left lower leg: Edema present.   Skin:     General: Skin is warm and dry.      Findings: No rash.   Neurological:      General: No focal deficit present.      Mental Status: She is alert and oriented to person, place, and time.   Psychiatric:         Mood and Affect: Mood and affect normal.         Behavior: Behavior normal.         Thought Content: Thought content normal.         Judgment: Judgment normal.         Assessment and Plan:         Amanda was seen today for follow-up.    Diagnoses and all orders for this visit:    Severe obesity (BMI >= 40)  -     Ambulatory referral/consult to Bariatric Medicine; Future  -     Ambulatory referral/consult to Nutrition Services; Future    Lymphedema of both lower extremities  - follows with vascular medicine   - lymphedema clinic, compression stockings, low sodium diet, bumex 0.5 daily    Venous insufficiency of both lower extremities  - as above     Mixed hyperlipidemia  - has declined to start Lipitor; opts to lower cholesterol with diet and exercise. Discussed ASCVD score >7.5% and increased risk for CVD including MI and CVA. Patient voices understanding     Pre-diabetes  -diet and lifestyle modifications   -Given high risk of progression into T2DM and increased risk of diabetes related complications, counseled on initiation of metformin. Patient declines at this time.     Primary hypertension  -     Hypertension Digital Medicine (HDMP) Enrollment Order  - DASH diet, BP journal until enrolled in program     Other iron deficiency anemia  -continue iron 325mg PO daily     Hypertensive chronic kidney disease with stage 1 through stage 4 chronic kidney disease, or unspecified chronic kidney disease  -     Ambulatory referral/consult to Nutrition Services; Future            Follow up in about 6 months (around 7/24/2023).    Other Orders Placed This Visit:  Orders Placed This Encounter   Procedures    Ambulatory referral/consult to Bariatric  Medicine    Ambulatory referral/consult to Nutrition Services    Hypertension Digital Medicine (HDMP) Enrollment Order         Fernandez Marroquin MD  Internal Medicine, PGY-3    Discussed with Dr. Kathleen

## 2023-01-24 NOTE — PATIENT INSTRUCTIONS
You were seen today for follow up.    After today's visit:   Keep appointment for both bariatric medicine and nutrition services   Work  on diet and exercise to prevent the need to start cholesterol pill

## 2023-01-26 ENCOUNTER — PATIENT MESSAGE (OUTPATIENT)
Dept: INTERNAL MEDICINE | Facility: CLINIC | Age: 73
End: 2023-01-26
Payer: MEDICARE

## 2023-01-27 ENCOUNTER — TELEPHONE (OUTPATIENT)
Dept: ORTHOPEDICS | Facility: CLINIC | Age: 73
End: 2023-01-27
Payer: MEDICARE

## 2023-01-27 ENCOUNTER — PATIENT MESSAGE (OUTPATIENT)
Dept: ORTHOPEDICS | Facility: CLINIC | Age: 73
End: 2023-01-27
Payer: MEDICARE

## 2023-01-27 DIAGNOSIS — M51.36 DDD (DEGENERATIVE DISC DISEASE), LUMBAR: Primary | ICD-10-CM

## 2023-01-31 ENCOUNTER — HOSPITAL ENCOUNTER (OUTPATIENT)
Dept: RADIOLOGY | Facility: HOSPITAL | Age: 73
Discharge: HOME | End: 2023-01-31
Attending: PHYSICIAN ASSISTANT
Payer: MEDICARE

## 2023-01-31 ENCOUNTER — OFFICE VISIT (OUTPATIENT)
Dept: ORTHOPEDICS | Facility: CLINIC | Age: 73
End: 2023-01-31
Payer: MEDICARE

## 2023-01-31 VITALS — HEIGHT: 65 IN | WEIGHT: 262.38 LBS | BODY MASS INDEX: 43.71 KG/M2

## 2023-01-31 DIAGNOSIS — M51.36 DDD (DEGENERATIVE DISC DISEASE), LUMBAR: ICD-10-CM

## 2023-01-31 DIAGNOSIS — G89.29 CHRONIC BILATERAL LOW BACK PAIN WITHOUT SCIATICA: ICD-10-CM

## 2023-01-31 DIAGNOSIS — M54.50 CHRONIC BILATERAL LOW BACK PAIN WITHOUT SCIATICA: ICD-10-CM

## 2023-01-31 DIAGNOSIS — M51.36 DDD (DEGENERATIVE DISC DISEASE), LUMBAR: Primary | ICD-10-CM

## 2023-01-31 PROCEDURE — 72110 X-RAY EXAM L-2 SPINE 4/>VWS: CPT | Mod: 26,,, | Performed by: RADIOLOGY

## 2023-01-31 PROCEDURE — 1101F PR PT FALLS ASSESS DOC 0-1 FALLS W/OUT INJ PAST YR: ICD-10-PCS | Mod: CPTII,S$GLB,, | Performed by: PHYSICIAN ASSISTANT

## 2023-01-31 PROCEDURE — 99213 OFFICE O/P EST LOW 20 MIN: CPT | Mod: S$GLB,,, | Performed by: PHYSICIAN ASSISTANT

## 2023-01-31 PROCEDURE — 1159F PR MEDICATION LIST DOCUMENTED IN MEDICAL RECORD: ICD-10-PCS | Mod: CPTII,S$GLB,, | Performed by: PHYSICIAN ASSISTANT

## 2023-01-31 PROCEDURE — 99213 PR OFFICE/OUTPT VISIT, EST, LEVL III, 20-29 MIN: ICD-10-PCS | Mod: S$GLB,,, | Performed by: PHYSICIAN ASSISTANT

## 2023-01-31 PROCEDURE — 72110 XR LUMBAR SPINE AP AND LAT WITH FLEX/EXT: ICD-10-PCS | Mod: 26,,, | Performed by: RADIOLOGY

## 2023-01-31 PROCEDURE — 99999 PR PBB SHADOW E&M-EST. PATIENT-LVL III: CPT | Mod: PBBFAC,,, | Performed by: PHYSICIAN ASSISTANT

## 2023-01-31 PROCEDURE — 3288F FALL RISK ASSESSMENT DOCD: CPT | Mod: CPTII,S$GLB,, | Performed by: PHYSICIAN ASSISTANT

## 2023-01-31 PROCEDURE — 3008F PR BODY MASS INDEX (BMI) DOCUMENTED: ICD-10-PCS | Mod: CPTII,S$GLB,, | Performed by: PHYSICIAN ASSISTANT

## 2023-01-31 PROCEDURE — 1125F AMNT PAIN NOTED PAIN PRSNT: CPT | Mod: CPTII,S$GLB,, | Performed by: PHYSICIAN ASSISTANT

## 2023-01-31 PROCEDURE — 1101F PT FALLS ASSESS-DOCD LE1/YR: CPT | Mod: CPTII,S$GLB,, | Performed by: PHYSICIAN ASSISTANT

## 2023-01-31 PROCEDURE — 1125F PR PAIN SEVERITY QUANTIFIED, PAIN PRESENT: ICD-10-PCS | Mod: CPTII,S$GLB,, | Performed by: PHYSICIAN ASSISTANT

## 2023-01-31 PROCEDURE — 1159F MED LIST DOCD IN RCRD: CPT | Mod: CPTII,S$GLB,, | Performed by: PHYSICIAN ASSISTANT

## 2023-01-31 PROCEDURE — 1160F PR REVIEW ALL MEDS BY PRESCRIBER/CLIN PHARMACIST DOCUMENTED: ICD-10-PCS | Mod: CPTII,S$GLB,, | Performed by: PHYSICIAN ASSISTANT

## 2023-01-31 PROCEDURE — 72110 X-RAY EXAM L-2 SPINE 4/>VWS: CPT | Mod: TC

## 2023-01-31 PROCEDURE — 99999 PR PBB SHADOW E&M-EST. PATIENT-LVL III: ICD-10-PCS | Mod: PBBFAC,,, | Performed by: PHYSICIAN ASSISTANT

## 2023-01-31 PROCEDURE — 3288F PR FALLS RISK ASSESSMENT DOCUMENTED: ICD-10-PCS | Mod: CPTII,S$GLB,, | Performed by: PHYSICIAN ASSISTANT

## 2023-01-31 PROCEDURE — 3008F BODY MASS INDEX DOCD: CPT | Mod: CPTII,S$GLB,, | Performed by: PHYSICIAN ASSISTANT

## 2023-01-31 PROCEDURE — 1160F RVW MEDS BY RX/DR IN RCRD: CPT | Mod: CPTII,S$GLB,, | Performed by: PHYSICIAN ASSISTANT

## 2023-01-31 RX ORDER — IBUPROFEN 200 MG
200 TABLET ORAL EVERY 6 HOURS PRN
COMMUNITY
End: 2023-08-08

## 2023-01-31 NOTE — PROGRESS NOTES
DATE: 1/31/2023  PATIENT: Amanda James    Supervising Physician: Geovany Haro M.D.    CHIEF COMPLAINT: back pain    HISTORY:  Amanda James is a 73 y.o. female here for initial evaluation of low back pain (Back - 4, Leg - 0).  The pain has been present for about 4 months. The patient describes the pain as burning.  The pain is worse with lifting, first thing in the morning and sitting and improved by activity. There is no associated numbness and tingling. There is no subjective weakness. Prior treatments have included biofreeze patches, lidocaine cream, voltaren gel and CBD oil, but no PT, ESIs ro surgery.    The patient denies myelopathic symptoms such as handwriting changes or difficulty with buttons/coins/keys. Denies perineal paresthesias, bowel/bladder dysfunction.    PAST MEDICAL/SURGICAL HISTORY:  Past Medical History:   Diagnosis Date    Allergy     Asthma     Hyperlipidemia     Hypertension     IZABEL (obstructive sleep apnea)     Pre-diabetes      Past Surgical History:   Procedure Laterality Date    BREAST BIOPSY Left     benign    BREAST LUMPECTOMY Left     COLONOSCOPY N/A 8/14/2019    Procedure: COLONOSCOPY;  Surgeon: Clinton Gottlieb MD;  Location: [address]);  Service: Endoscopy;  Laterality: N/A;  BMI 47.45  IZABEL    ESOPHAGOGASTRODUODENOSCOPY N/A 8/14/2019    Procedure: EGD (ESOPHAGOGASTRODUODENOSCOPY);  Surgeon: Clinton Gottlieb MD;  Location: [address]);  Service: Endoscopy;  Laterality: N/A;  BMI 47.45  IZABEL    HYSTERECTOMY      PARTIAL HYSTERECTOMY      uterus removed       Medications:   Current Outpatient Medications on File Prior to Visit   Medication Sig Dispense Refill    atorvastatin (LIPITOR) 40 MG tablet Take 1 tablet (40 mg total) by mouth once daily. 90 tablet 3    bumetanide (BUMEX) 0.5 MG Tab Take 1 tablet (0.5 mg total) by mouth once daily. 90 tablet 3    cholecalciferol, vitamin D3, 50,000 unit capsule TK 1 C PO WEEKLY  3    ergocalciferol (ERGOCALCIFEROL)  50,000 unit Cap TAKE 1 CAPSULE EVERY 7 DAYS 12 capsule 3    ferrous sulfate (FEROSUL) 325 mg (65 mg iron) Tab tablet Take 1 tablet (325 mg total) by mouth once daily. 90 tablet 3    FLUoxetine 10 MG capsule TAKE 2 CAPSULES(20 MG) BY MOUTH EVERY  capsule 1    fluticasone propionate (FLONASE) 50 mcg/actuation nasal spray 1 spray (50 mcg total) by Each Nostril route once daily. 16 g 11    LIDOcaine (LIDODERM) 5 % Place 1 patch onto the skin once daily. Remove & Discard patch within 12 hours or as directed by MD 7 patch 0    LIDOcaine HCL 2% (XYLOCAINE) 2 % jelly Apply topically as needed. Apply topically once nightly to affected part of foot/feet. 30 mL 2    montelukast (SINGULAIR) 10 mg tablet Take 1 tablet (10 mg total) by mouth once daily. 90 tablet 3    multivit-min-iron-FA-lutein (CENTRUM SILVER WOMEN) 8 mg iron-400 mcg-300 mcg Tab Take 1 tablet by mouth once daily.      olmesartan-hydrochlorothiazide (BENICAR HCT) 40-12.5 mg Tab Take one tab po daily 90 tablet 3    diclofenac sodium (VOLTAREN) 1 % Gel Apply 2 g topically 3 (three) times daily as needed (Muscle joint pain). 100 g 0    ibuprofen (ADVIL,MOTRIN) 200 MG tablet Take 200 mg by mouth every 6 (six) hours as needed for Pain.       No current facility-administered medications on file prior to visit.       Social History:   Social History     Socioeconomic History    Marital status: Single   Tobacco Use    Smoking status: Former     Types: Cigarettes     Quit date: 1999     Years since quittin.7    Smokeless tobacco: Never   Substance and Sexual Activity    Alcohol use: Yes     Comment: socially/ rare    Drug use: No    Sexual activity: Not Currently       REVIEW OF SYSTEMS:  Constitution: Negative. Negative for chills, fever and night sweats.   Cardiovascular: Negative for chest pain and syncope.   Respiratory: Negative for cough and shortness of breath.   Gastrointestinal: See HPI. Negative for nausea/vomiting. Negative for abdominal  "pain.  Genitourinary: See HPI. Negative for discoloration or dysuria.  Skin: Negative for dry skin, itching and rash.   Hematologic/Lymphatic: Negative for bleeding problem. Does not bruise/bleed easily.   Musculoskeletal: Negative for falls and muscle weakness.   Neurological: See HPI. No seizures.   Endocrine: Negative for polydipsia, polyphagia and polyuria.   Allergic/Immunologic: Negative for hives and persistent infections.     EXAM:  Ht 5' 5" (1.651 m)   Wt 119 kg (262 lb 5.6 oz)   LMP  (LMP Unknown)   BMI 43.66 kg/m²     General: The patient is a very pleasant 73 y.o. female in no apparent distress, the patient is oriented to person, place and time.  Psych: Normal mood and affect  HEENT: Vision grossly intact, hearing intact to the spoken word.  Lungs: Respirations unlabored.  Gait: Antalgic station and gait, no difficulty with toe or heel walk.   Skin: Dorsal lumbar skin negative for rashes, lesions, hairy patches and surgical scars. There is minimal lumbar tenderness to palpation.  Range of motion: Lumbar range of motion is acceptable.  Spinal Balance: Global saggital and coronal spinal balance acceptable, not significant for scoliosis and kyphosis.  Musculoskeletal: No pain with the range of motion of the bilateral hips. No trochanteric tenderness to palpation.  Vascular: Bilateral lower extremities warm and well perfused, dorsalis pedis pulses 2+ bilaterally.  Neurological: Normal strength and tone in all major motor groups in the bilateral lower extremities. Normal sensation to light touch in the L2-S1 dermatomes bilaterally.  Deep tendon reflexes symmetric 1+ in the bilateral lower extremities.  Negative Babinski bilaterally. Straight leg raise negative bilaterally.    IMAGING:      Today I personally reviewed AP, Lat and Flex/Ex  upright L-spine films that demonstrate multilevel degenerative change and grade I anterolisthesis at L5/S1.       Body mass index is 43.66 kg/m².    Hemoglobin A1C   Date " Value Ref Range Status   10/07/2022 6.2 (H) 4.0 - 5.6 % Final     Comment:     ADA Screening Guidelines:  5.7-6.4%  Consistent with prediabetes  >or=6.5%  Consistent with diabetes    High levels of fetal hemoglobin interfere with the HbA1C  assay. Heterozygous hemoglobin variants (HbS, HgC, etc)do  not significantly interfere with this assay.   However, presence of multiple variants may affect accuracy.     04/29/2021 6.1 (H) 4.0 - 5.6 % Final     Comment:     ADA Screening Guidelines:  5.7-6.4%  Consistent with prediabetes  >or=6.5%  Consistent with diabetes    High levels of fetal hemoglobin interfere with the HbA1C  assay. Heterozygous hemoglobin variants (HbS, HgC, etc)do  not significantly interfere with this assay.   However, presence of multiple variants may affect accuracy.     11/12/2019 6.0 (H) 4.0 - 5.6 % Final     Comment:     ADA Screening Guidelines:  5.7-6.4%  Consistent with prediabetes  >or=6.5%  Consistent with diabetes  High levels of fetal hemoglobin interfere with the HbA1C  assay. Heterozygous hemoglobin variants (HbS, HgC, etc)do  not significantly interfere with this assay.   However, presence of multiple variants may affect accuracy.             ASSESSMENT/PLAN:    Amanda was seen today for low-back pain.    Diagnoses and all orders for this visit:    DDD (degenerative disc disease), lumbar  -     Ambulatory referral/consult to Physical/Occupational Therapy; Future    Chronic bilateral low back pain without sciatica  -     Ambulatory referral/consult to Physical/Occupational Therapy; Future        Today we discussed at length all of the different treatment options including anti-inflammatories, acetaminophen, rest, ice, heat, physical therapy including strengthening and stretching exercises, home exercises, ROM, aerobic conditioning, aqua therapy, other modalities including ultrasound, massage, and dry needling, epidural steroid injections and finally surgical intervention.      Referral for  PT placed today.  She will continue using the topicals as needed. She will follow up with me after therapy if symptoms persist. We may consider an MRI at that time.

## 2023-02-07 ENCOUNTER — CLINICAL SUPPORT (OUTPATIENT)
Dept: REHABILITATION | Facility: HOSPITAL | Age: 73
End: 2023-02-07
Payer: MEDICARE

## 2023-02-07 ENCOUNTER — TELEPHONE (OUTPATIENT)
Dept: HEPATOLOGY | Facility: HOSPITAL | Age: 73
End: 2023-02-07
Payer: MEDICARE

## 2023-02-07 ENCOUNTER — TELEPHONE (OUTPATIENT)
Dept: REHABILITATION | Facility: HOSPITAL | Age: 73
End: 2023-02-07
Payer: MEDICARE

## 2023-02-07 DIAGNOSIS — R60.0 EDEMA OF BOTH LOWER EXTREMITIES: ICD-10-CM

## 2023-02-07 DIAGNOSIS — I89.0 LYMPHEDEMA OF BOTH LOWER EXTREMITIES: Primary | ICD-10-CM

## 2023-02-07 DIAGNOSIS — I87.2 VENOUS INSUFFICIENCY OF BOTH LOWER EXTREMITIES: ICD-10-CM

## 2023-02-07 PROCEDURE — 97140 MANUAL THERAPY 1/> REGIONS: CPT

## 2023-02-07 NOTE — PROGRESS NOTES
[facility]  Occupational Therapy Treatment Note    Date: 2/7/2023  Name: Amanda James  Clinic Number: [id number]    Time In: 9:00  Time Out: 10:00  Total Billable Time: 60 minutes    Therapy Diagnosis:   Encounter Diagnoses   Name Primary?    Lymphedema of both lower extremities Yes    Edema of both lower extremities     Venous insufficiency of both lower extremities      Physician: Giuseppe Lang MD*  Physician Orders: OT Eval and Treat    Evaluation Date: 12/27/22  Insurance Authorization Period Expiration: 1/2/2023-2/28/2023  Plan of Care Expiration: 2/7/2023  Visit # / Visits authorized: 4/ 20    Precautions:  Standard and Diabetes    SUBJECTIVE     Pt reports: she  purchased new compression knee high stockings with zippers for easy don/doffing. Patient states the color in her legs looks good, as does her skin.  She has been wearing them for the last 4 weeks.  Patient states she is starting PT for her back in [place].  She was compliant with home compression program given last session.   Response to previous treatment: improved skin color, decreased edema in the lower legs  Functional change: n/a    Pain: 0/10  Location: bilateral lower legs     OBJECTIVE     Pt arrived wearing her short pants, and wearing her knee high compression socks.  Girth Measurements (in centimeters)  LANDMARK LEFT LE  12/27/2022 2/7/23 RIGHT LE  12/27/2022 2/7/23    Floor+40cm 48.5cm  47.0cm -1.5cm 46.5 cm  45.0cm  -1.5cm    Floor+30cm 44.5 cm  44.0cm  -.5cm 43.5 cm  42.0cm  -1.5cm    Floor+20cm 34.0 cm  34.0cm ----------    29.5 cm  31.5cm  +2.0cm    Floor+10cm 32.5 cm  31.5cm  -1.0cm 28.0 cm  28.5cm  +.5cm    Pinky+5cm 25.5 cm  24.0cm  -1.5cm 24.5 cm  24.5cm  n/c    metatarsals 25.5 cm 25.5cm  ---------- 25.0 cm  24.5cm  -.5cm                                     Treatment     Amanda received the treatments listed below:     Manual therapy techniques were applied for 60 minutes,  including:    MANUAL LYMPHATIC DRAINAGE (MLD):    While supine with LEs elevated stimulation at terminus, along GI region, B inguinal regions, drainage of entire R LE zac lower leg, ankle, and foot with return proximally.  The thigh is very soft with no pitting edema noted. The lower leg has no pitting throughout. . There is no pitting edema found over the foot.  All fluid was then shunted back up through the lateral leg and the abdominal and inguinal nodes were treated again.  There is no pitting noted in the LLE or foot.    MULTILAYERED BANDAGING:  patient declined short stretch bandages and requested use of her knee high compression socks. Therapist donned the socks to BLE.  The socks appear to be well fitting.  Wash and wear schedules confirmed.     Discussed wear schedule, don/doff, wash and management of socks  Size and compression class and AM/PM needs.        Patient Education and Home Exercises      Education provided:   1. Educated on cause of lymphedema.  2. Explained the Complete Decongestive Therapy protocol in depth  3. Educated on Phase 1 and 2 of protocol.  4. Reviewed treatment frequency and likely duration of weeks  5. Precautions and contraindications for treatment.  6. Plan of care and goals.  7. Educated on home management protocols.     Written Home Compression Program Provided:  Patient encouraged to wear knee high compression from first waking until back in bed.  Off for bathing and sleeping only .   Amanda demonstrated good  understanding of the compression program provided.      Assessment       Amanda James is a 72 y.o. female referred to outpatient occupational therapy and presents with a medical diagnosis of lymphedema secondary to venous insuffiency. Lymphedema, left untreated increases risk of infection, gait deviation causing ortho problems and poor body image. Patient presents with the following therapy deficits: lymphedema of bilateral lower limbs. The following goals were  discussed with the patient and patient is in agreement with them as to be addressed in the treatment plan. The patient's rehab potential is Excellent.       Amanda made excellent progress towards her goals and there is no need for any further treatment.    Anticipated barriers to occupational therapy: none       Goals:   Short Term Goals for 3 weeks: (phase 1 of protocol)  1. Patient and/or caregiver will demonstrate understanding of lymphedema precautions to decrease the risk of infection and lymphedema exacerbation. - MET 2/7//2023  2. Patient will tolerate daily activities wearing compression knee highs-Met 2/7/2023  3. Patient and/or caregiver will order/obtain appropriate compression garments- met 1/5/2023  4. Patient will experience a decrease in girth of affected areas of 1.0 cm-  Mostly met, see chart above 2/7/2023     Long Term Goals for 6 weeks: (Phase 2 of goals)  1. Patient and/or caregiver will be independent with donning and doffing of compression garments. MET 2/7/2023  2. Patient and/or caregiver will be independent with HEP and lymphedema management to help prevent edema relapse and reduce risk of infection. MET 2/7/2023  4. Patient will experience a decreased in girth of affected areas of 2.0 cm not met, see chart above.  PLAN     Patient will continue to wear compression knee high stockings at home. No further formal therapy is indicated. Patient is discharged to home program.   Yue Zamorano OT, JUAN R

## 2023-02-07 NOTE — TELEPHONE ENCOUNTER
Called to discuss question regarding diagnosis of CKD. Patient verbalizes understanding and all questions answered. Plan to continue diet and lifestyle modifications including weight loss.

## 2023-02-07 NOTE — TELEPHONE ENCOUNTER
Spoke with patient and informed her about offering health coaching services now to patients via subscription pricing. She was very interested but declined because she is in occupational therapy and has an upcoming nutritionist appt. She will reach out if anything changes and provided her with number to reach  at [phone]

## 2023-02-09 ENCOUNTER — TELEPHONE (OUTPATIENT)
Dept: BARIATRICS | Facility: CLINIC | Age: 73
End: 2023-02-09
Payer: MEDICARE

## 2023-02-09 NOTE — TELEPHONE ENCOUNTER
Spoke to pt. Pt stated she had not spoken with FC. Scheduled new FC appt for 3/8. Told pt to contact clinic after her FC appt. Pt verbalized understanding.   Detail Level: Detailed General Sunscreen Counseling: I recommended a broad spectrum sunscreen with an SPF of 30 or higher. SPF 30 sunscreens block approximately 97% of the sun's harmful rays.  Sunscreens should be applied at least 15 minutes prior to expected sun exposure and then every 2 hours after that as long as sun exposure continues. If swimming or exercising sunscreen should be reapplied every 45 minutes to an hour after getting wet or sweating. I also recommended a lip balm with a sunscreen. Sun protective clothing can be used in lieu of sunscreen but must be worn the entire time you are exposed to the sun's rays.

## 2023-02-09 NOTE — TELEPHONE ENCOUNTER
----- Message from Chloe Souza sent at 2/9/2023 12:47 PM CST -----  Type:  Sooner Apoointment Request    Caller is requesting a sooner appointment.  Caller declined first available appointment listed below.  Caller will not accept being placed on the waitlist and is requesting a message be sent to doctor.  Name of Caller:pt   When is the first available appointment?  Symptoms:pt has referral   Would the patient rather a call back or a response via [facility]? Call   Best Call Back Number:[phone]  Additional Information:

## 2023-02-17 PROBLEM — M51.36 DDD (DEGENERATIVE DISC DISEASE), LUMBAR: Status: ACTIVE | Noted: 2023-02-17

## 2023-02-17 PROBLEM — M51.369 DDD (DEGENERATIVE DISC DISEASE), LUMBAR: Status: ACTIVE | Noted: 2023-02-17

## 2023-02-17 PROBLEM — M54.50 CHRONIC BILATERAL LOW BACK PAIN WITHOUT SCIATICA: Status: ACTIVE | Noted: 2023-02-17

## 2023-02-17 PROBLEM — G89.29 CHRONIC BILATERAL LOW BACK PAIN WITHOUT SCIATICA: Status: ACTIVE | Noted: 2023-02-17

## 2023-02-28 ENCOUNTER — NUTRITION (OUTPATIENT)
Dept: NUTRITION | Facility: CLINIC | Age: 73
End: 2023-02-28
Payer: MEDICARE

## 2023-02-28 VITALS — HEIGHT: 65 IN | WEIGHT: 263.88 LBS | BODY MASS INDEX: 43.96 KG/M2

## 2023-02-28 DIAGNOSIS — E78.5 DYSLIPIDEMIA: ICD-10-CM

## 2023-02-28 DIAGNOSIS — E66.01 SEVERE OBESITY (BMI >= 40): Primary | ICD-10-CM

## 2023-02-28 DIAGNOSIS — I12.9 HYPERTENSIVE CHRONIC KIDNEY DISEASE WITH STAGE 1 THROUGH STAGE 4 CHRONIC KIDNEY DISEASE, OR UNSPECIFIED CHRONIC KIDNEY DISEASE: ICD-10-CM

## 2023-02-28 DIAGNOSIS — Z71.3 DIETARY COUNSELING: ICD-10-CM

## 2023-02-28 DIAGNOSIS — R73.03 PREDIABETES: ICD-10-CM

## 2023-02-28 PROCEDURE — 99999 PR PBB SHADOW E&M-EST. PATIENT-LVL III: ICD-10-PCS | Mod: PBBFAC,,,

## 2023-02-28 PROCEDURE — 97802 MEDICAL NUTRITION INDIV IN: CPT | Mod: S$GLB,,, | Performed by: DIETITIAN, REGISTERED

## 2023-02-28 PROCEDURE — 99999 PR PBB SHADOW E&M-EST. PATIENT-LVL III: CPT | Mod: PBBFAC,,,

## 2023-02-28 PROCEDURE — 97802 PR MED NUTR THER, 1ST, INDIV, EA 15 MIN: ICD-10-PCS | Mod: S$GLB,,, | Performed by: DIETITIAN, REGISTERED

## 2023-02-28 NOTE — PROGRESS NOTES
"Referring Physician:Fernandez Marroquin MD     Reason for visit:  Chief Complaint   Patient presents with    Obesity    Hyperlipidemia    prediabetes    Nutrition Counseling    Chronic Kidney Disease      Initial Visit    :1950     Allergies Reviewed  Meds Reviewed    Anthropometrics  Weight:119.7 kg (263 lb 14.3 oz)  Height:5' 5" (1.651 m)  BMI:Body mass index is 43.91 kg/m².   IBW: 57.0 kg  +/-10%    Meds:  Outpatient Medications Prior to Visit   Medication Sig Dispense Refill    atorvastatin (LIPITOR) 40 MG tablet Take 1 tablet (40 mg total) by mouth once daily. 90 tablet 3    bumetanide (BUMEX) 0.5 MG Tab Take 1 tablet (0.5 mg total) by mouth once daily. 90 tablet 3    cholecalciferol, vitamin D3, 50,000 unit capsule TK 1 C PO WEEKLY  3    diclofenac sodium (VOLTAREN) 1 % Gel Apply 2 g topically 3 (three) times daily as needed (Muscle joint pain). 100 g 0    ergocalciferol (ERGOCALCIFEROL) 50,000 unit Cap TAKE 1 CAPSULE EVERY 7 DAYS 12 capsule 3    ferrous sulfate (FEROSUL) 325 mg (65 mg iron) Tab tablet Take 1 tablet (325 mg total) by mouth once daily. 90 tablet 3    FLUoxetine 10 MG capsule TAKE 2 CAPSULES(20 MG) BY MOUTH EVERY  capsule 1    fluticasone propionate (FLONASE) 50 mcg/actuation nasal spray 1 spray (50 mcg total) by Each Nostril route once daily. 16 g 11    ibuprofen (ADVIL,MOTRIN) 200 MG tablet Take 200 mg by mouth every 6 (six) hours as needed for Pain.      LIDOcaine (LIDODERM) 5 % Place 1 patch onto the skin once daily. Remove & Discard patch within 12 hours or as directed by MD 7 patch 0    LIDOcaine HCL 2% (XYLOCAINE) 2 % jelly Apply topically as needed. Apply topically once nightly to affected part of foot/feet. 30 mL 2    montelukast (SINGULAIR) 10 mg tablet Take 1 tablet (10 mg total) by mouth once daily. 90 tablet 3    multivit-min-iron-FA-lutein (CENTRUM SILVER WOMEN) 8 mg iron-400 mcg-300 mcg Tab Take 1 tablet by mouth once daily.      olmesartan-hydrochlorothiazide (BENICAR " "HCT) 40-12.5 mg Tab Take one tab po daily 90 tablet 3     No facility-administered medications prior to visit.       Food/Drug Interactions Noted:  n/a    Vitamins/Supplements/Herbs:  MVI, Vit D3, calcium    Labs: 10/07/2022:  HgbA1c  6.2   Chol  238   HDL  42   LDL Chol  176.6   eGFR  47.8   Cr  1.2     Nutrition Prescription: 1710 Kcals/day( 30 kcal/kg IBW),  46 g protein( 0.8 g/kg IBW)     Support System:  pt does her own grocery shopping online and her daughter picks it up; does her own cooking.    Diet Hx: pt states she has lost 20 lbs.  Is sensitive to all dairy products.  Tries to eat mostly "fresh" food; avoids canned foods.  Is unconcerned with blood sugar "my A1c has been the same for years".   Snacks:  lately has been fruit or egg white or 1 spoon peanut butter off the spoon, or on a slice of bread with apple butter.    Breakfast: banana.  Occasionally eats oatmeal (with cinnamon, nutmeg, vanilla, molasses) or grits with butter. Coffee with Sweet & Low and nondairy creamer  Lunch: egg white omelet with veggies on low carb tortilla or no sugar bread with 9 gm carb/slice.  Water.  Dinner: air lentz fish or chicken with sweet potato and veggies.  Water or occasionally fruit juice    Current activity level and/or physical limitations:  no exercise at this time; when she starts PT for arthritis in her back, will exercise and plans to join a gym.  Pt usually uses a cane (she lost it), and was noticeably unstable on her feet today.  Walked holding on the a wall.    Motivation to make changes/anticipated barriers and/or expected adherence:  no barriers to adherence identified    Nutrition-Focus Physical Findings:  Pt wearing face covering per COVID19 protocol; pt appears well nourished      Assessment:  pt attentive and asked relevant questions about foods/beverages recommended and to avoid; healthy snacking; sample meal plan and menus; portion control; grocery shopping and cooking tips; reading food labels.  All " questions answered, and she verbalized understanding of information.  Note:  pt had been scheduled with Bariatric  on 03/08/23; pt asked me to cancel that appt for her as she is not interested in bariatric treatment (she thought the appt had already been canceled)    Nutrition Diagnosis: Food- and nutrition-related knowledge deficit RT lack of prior exposure to information AEB dx dyslipidemia      Recommendations:  1200 calorie, low fat, low cholesterol, high fiber diet. Exercise as tolerated.  Handouts provided and reviewed:  My Plate Planner; Cardiac-TLC Nutrition Therapy; 1800 Calorie Sample 5-Day Menus; Servings of Carbohydrates for Meal Planning; Reading A Food Label; Tips to Support Weight Loss; Weight Loss Tips, Heart-Healthy Cooking Tips, Heart Healthy Shopping Tips;  Eat Fit Plan...Anytime/Anywhere; Shop Fit-Get Fit Shopping List; [facility] Snack List for Diabetes; Exercise & Weight-Losing It and Keeping It Off (NLA); Achieving A Healthy Weight (NLA); Viscous Fiber and Your Cholesterol   Acceptable Salt-Free Seasoning Blends; Sodium-free Flavoring Tips      Strategies Implemented:  portion control; read food labels    Consultation Time:30 minutes.  Communicated with referring healthcare provider:  Consult note available in pt's Epic chart per MD discretion  Follow Up:  Pt provided with dietitian contact number and advised to call with questions or make future appointment if further intervention needed.

## 2023-06-23 ENCOUNTER — TELEPHONE (OUTPATIENT)
Dept: INTERNAL MEDICINE | Facility: CLINIC | Age: 73
End: 2023-06-23
Payer: MEDICARE

## 2023-06-23 DIAGNOSIS — J30.2 SEASONAL ALLERGIES: ICD-10-CM

## 2023-06-23 DIAGNOSIS — E55.9 VITAMIN D DEFICIENCY: ICD-10-CM

## 2023-06-23 NOTE — TELEPHONE ENCOUNTER
----- Message from Smiley Lemusson sent at 6/23/2023 12:52 PM CDT -----  Contact: [phone]  Requesting an RX refill or new RX.  Is this a refill or new RX:   RX name and strength (copy/paste from chart):  olmesartan-hydrochlorothiazide (BENICAR HCT) 40-12.5 mg Tab  Is this a 30 day or 90 day RX:   Pharmacy name and phone # (copy/paste from chart):    [facility] - [place], [place] - [address]  [address] [id number]  Phone: [phone] Fax: [phone]    Requesting an RX refill or new RX.  Is this a refill or new RX:   RX name and strength (copy/paste from chart):  fluticasone propionate (FLONASE) 50 mcg/actuation nasal spray  Is this a 30 day or 90 day RX:   Pharmacy name and phone # (copy/paste from chart):    [facility] - [place], [place] - [address] [facility]  [address] [address]  Phone: [phone] Fax: [phone]

## 2023-06-27 RX ORDER — OLMESARTAN MEDOXOMIL AND HYDROCHLOROTHIAZIDE 40/12.5 40; 12.5 MG/1; MG/1
TABLET ORAL
Qty: 90 TABLET | Refills: 3 | Status: SHIPPED | OUTPATIENT
Start: 2023-06-27 | End: 2023-08-08 | Stop reason: ALTCHOICE

## 2023-06-27 RX ORDER — FLUTICASONE PROPIONATE 50 MCG
1 SPRAY, SUSPENSION (ML) NASAL DAILY
Qty: 16 G | Refills: 11 | Status: SHIPPED | OUTPATIENT
Start: 2023-06-27

## 2023-06-27 NOTE — TELEPHONE ENCOUNTER
Refill Request received for Benicar and Flonase on 6/27/23. Reviewed chart, medications, and allergies. Last refill noted on 10/19/21 and 10/05/22 respectively. Pt last seen by Dr Lang (Cardiology) on 1/17/23. Escript sent to preferred pharmacy. Recommend Ms. Amanda James primary care follow up at next scheduled appointment.      Waldemar Oliver MD  Internal Medicine, PGY-2  [facility]        no

## 2023-07-11 ENCOUNTER — OFFICE VISIT (OUTPATIENT)
Dept: URGENT CARE | Facility: CLINIC | Age: 73
End: 2023-07-11
Payer: MEDICARE

## 2023-07-11 VITALS
BODY MASS INDEX: 39.99 KG/M2 | SYSTOLIC BLOOD PRESSURE: 86 MMHG | DIASTOLIC BLOOD PRESSURE: 52 MMHG | RESPIRATION RATE: 18 BRPM | TEMPERATURE: 98 F | HEART RATE: 64 BPM | HEIGHT: 65 IN | OXYGEN SATURATION: 96 % | WEIGHT: 240 LBS

## 2023-07-11 DIAGNOSIS — R06.2 WHEEZING DUE TO ALLERGY: ICD-10-CM

## 2023-07-11 DIAGNOSIS — T78.40XA WHEEZING DUE TO ALLERGY: ICD-10-CM

## 2023-07-11 DIAGNOSIS — R05.1 ACUTE COUGH: Primary | ICD-10-CM

## 2023-07-11 LAB
CTP QC/QA: YES
SARS-COV-2 AG RESP QL IA.RAPID: NEGATIVE

## 2023-07-11 PROCEDURE — 87811 SARS-COV-2 COVID19 W/OPTIC: CPT | Mod: QW,S$GLB,, | Performed by: NURSE PRACTITIONER

## 2023-07-11 PROCEDURE — 99213 PR OFFICE/OUTPT VISIT, EST, LEVL III, 20-29 MIN: ICD-10-PCS | Mod: S$GLB,,, | Performed by: NURSE PRACTITIONER

## 2023-07-11 PROCEDURE — 87811 SARS CORONAVIRUS 2 ANTIGEN POCT, MANUAL READ: ICD-10-PCS | Mod: QW,S$GLB,, | Performed by: NURSE PRACTITIONER

## 2023-07-11 PROCEDURE — 99213 OFFICE O/P EST LOW 20 MIN: CPT | Mod: S$GLB,,, | Performed by: NURSE PRACTITIONER

## 2023-07-11 RX ORDER — ALBUTEROL SULFATE 90 UG/1
2 AEROSOL, METERED RESPIRATORY (INHALATION) EVERY 6 HOURS PRN
Qty: 1 G | Refills: 1 | Status: SHIPPED | OUTPATIENT
Start: 2023-07-11

## 2023-07-11 RX ORDER — BENZONATATE 100 MG/1
100 CAPSULE ORAL 3 TIMES DAILY PRN
Qty: 90 CAPSULE | Refills: 0 | Status: SHIPPED | OUTPATIENT
Start: 2023-07-11 | End: 2023-08-08

## 2023-07-11 NOTE — PATIENT INSTRUCTIONS
"Increase fluids and rest.  You body needs increased water but other beverages may aid in comfort.  You will know that you have had enough water to be hydrated when your urine is clear or at least a very pale yellow.  You may use Mucinex to help thin thick secretions to allow you to expel them but it only works if you drink more water. Nasal saline may help with removal of mucus as well.  Ibuprofen is preferred for aches and pains as well as fever reduction.  Zyrtec or Claritin or Allegra may help with some of the runny nose symptoms if you are having them.  If you do not have high blood pressure, then you may use a decongestant such as pseudoephedrine or one of the above medications that have the letter, "-D" following it.  Hot tea with honey can help with sore throats as the heat with reduce the inflammation and the honey will coat your throat to help it feel better. Prescription cough medicine should be used at night to aid in sleep.  Coughing during the day is the body's way of removing the infectious agent; however, the prescription cough medicine may also be used for coughing fits during the day. Lastly, good hand washing and cough hygiene (cough into your elbow) will help prevent the spread of the illness.  A general rule is that you are no longer contagious once you have been without a fever for over 24 hours without requiring fever reducing medications.    "

## 2023-07-11 NOTE — PROGRESS NOTES
Subjective:      Patient ID: Amanda James is a 73 y.o. female.    Vitals:  vitals were not taken for this visit.     Chief Complaint: No chief complaint on file.    This is a 73 y.o. female who presents today with a chief complaint of coughing and running eyes, running nose. Patient states she over the counter cough medication and nasal spray.       Cough  This is a new problem. The current episode started in the past 7 days. The problem has been gradually worsening. The problem occurs every few minutes. The cough is Productive of sputum (cough greenish mucus). Associated symptoms include nasal congestion, postnasal drip and wheezing. Pertinent negatives include no chest pain, chills, ear congestion, ear pain, fever, headaches, heartburn, hemoptysis, myalgias, rash, rhinorrhea, sore throat, shortness of breath, sweats or weight loss. Associated symptoms comments: Dizziness and light headed. . The symptoms are aggravated by dust, cold air and pollens. Treatments tried: over the counter cough medication, nasal spray, The treatment provided mild relief. Her past medical history is significant for asthma.     Constitution: Negative for chills and fever.   HENT:  Positive for postnasal drip. Negative for ear pain and sore throat.    Cardiovascular:  Negative for chest pain.   Respiratory:  Positive for cough and wheezing. Negative for bloody sputum and shortness of breath.    Gastrointestinal:  Negative for heartburn.   Musculoskeletal:  Negative for muscle ache.   Skin:  Negative for rash.   Neurological:  Negative for headaches.      Pt informs provider that she wheezes but is afraid to use inhaler because it makes her shaky.  She only wheezes when the pollen is high and her eyes are watering and she is coughing a lot.  She wants to make sure she is NEG for COVID due to her age.   Objective:     Physical Exam   Constitutional: She is oriented to person, place, and time. She appears well-developed. She is  cooperative.  Non-toxic appearance. She does not appear ill. No distress.   HENT:   Head: Normocephalic and atraumatic.   Ears:   Right Ear: Hearing, tympanic membrane, external ear and ear canal normal.   Left Ear: Hearing, tympanic membrane, external ear and ear canal normal.   Nose: Nose normal. No mucosal edema, rhinorrhea or nasal deformity. No epistaxis. Right sinus exhibits no maxillary sinus tenderness and no frontal sinus tenderness. Left sinus exhibits no maxillary sinus tenderness and no frontal sinus tenderness.   Mouth/Throat: Uvula is midline, oropharynx is clear and moist and mucous membranes are normal. Mucous membranes are moist. No trismus in the jaw. Normal dentition. No uvula swelling. No oropharyngeal exudate, posterior oropharyngeal edema or posterior oropharyngeal erythema. Oropharynx is clear.   Eyes: Conjunctivae and lids are normal. No scleral icterus.   Neck: Trachea normal and phonation normal. Neck supple. No edema present. No erythema present. No neck rigidity present.   Cardiovascular: Normal rate, regular rhythm, normal heart sounds and normal pulses.   Pulmonary/Chest: Effort normal and breath sounds normal. No respiratory distress. She has no decreased breath sounds. She has no rhonchi.   Abdominal: Normal appearance.   Musculoskeletal: Normal range of motion.         General: No deformity. Normal range of motion.   Neurological: She is alert and oriented to person, place, and time. She exhibits normal muscle tone. Coordination normal.   Skin: Skin is warm, dry, intact, not diaphoretic and not pale.   Psychiatric: Her speech is normal and behavior is normal. Judgment and thought content normal.   Nursing note and vitals reviewed.    Assessment:     No diagnosis found.  Encounter Diagnoses   Name Primary?    Acute cough Yes    Wheezing due to allergy          Plan:       There are no diagnoses linked to this encounter.  Results for orders placed or performed in visit on 07/11/23  "  SARS Coronavirus 2 Antigen, POCT Manual Read   Result Value Ref Range    SARS Coronavirus 2 Antigen Negative Negative     Acceptable Yes           Patient Instructions   Increase fluids and rest.  You body needs increased water but other beverages may aid in comfort.  You will know that you have had enough water to be hydrated when your urine is clear or at least a very pale yellow.  You may use Mucinex to help thin thick secretions to allow you to expel them but it only works if you drink more water. Nasal saline may help with removal of mucus as well.  Ibuprofen is preferred for aches and pains as well as fever reduction.  Zyrtec or Claritin or Allegra may help with some of the runny nose symptoms if you are having them.  If you do not have high blood pressure, then you may use a decongestant such as pseudoephedrine or one of the above medications that have the letter, "-D" following it.  Hot tea with honey can help with sore throats as the heat with reduce the inflammation and the honey will coat your throat to help it feel better. Prescription cough medicine should be used at night to aid in sleep.  Coughing during the day is the body's way of removing the infectious agent; however, the prescription cough medicine may also be used for coughing fits during the day. Lastly, good hand washing and cough hygiene (cough into your elbow) will help prevent the spread of the illness.  A general rule is that you are no longer contagious once you have been without a fever for over 24 hours without requiring fever reducing medications.             "

## 2023-08-08 ENCOUNTER — OFFICE VISIT (OUTPATIENT)
Dept: INTERNAL MEDICINE | Facility: CLINIC | Age: 73
End: 2023-08-08
Payer: MEDICARE

## 2023-08-08 VITALS
WEIGHT: 244.94 LBS | TEMPERATURE: 97 F | OXYGEN SATURATION: 98 % | DIASTOLIC BLOOD PRESSURE: 70 MMHG | SYSTOLIC BLOOD PRESSURE: 110 MMHG | BODY MASS INDEX: 40.81 KG/M2 | HEIGHT: 65 IN | RESPIRATION RATE: 18 BRPM | HEART RATE: 72 BPM

## 2023-08-08 DIAGNOSIS — Z78.0 ASYMPTOMATIC MENOPAUSAL STATE: ICD-10-CM

## 2023-08-08 DIAGNOSIS — D50.8 OTHER IRON DEFICIENCY ANEMIA: ICD-10-CM

## 2023-08-08 DIAGNOSIS — I87.2 VENOUS INSUFFICIENCY OF BOTH LOWER EXTREMITIES: ICD-10-CM

## 2023-08-08 DIAGNOSIS — I10 PRIMARY HYPERTENSION: Chronic | ICD-10-CM

## 2023-08-08 DIAGNOSIS — Z76.89 ENCOUNTER TO ESTABLISH CARE WITH NEW DOCTOR: Primary | ICD-10-CM

## 2023-08-08 DIAGNOSIS — R73.03 PREDIABETES: ICD-10-CM

## 2023-08-08 DIAGNOSIS — N18.31 STAGE 3A CHRONIC KIDNEY DISEASE: ICD-10-CM

## 2023-08-08 DIAGNOSIS — E78.2 MIXED HYPERLIPIDEMIA: ICD-10-CM

## 2023-08-08 DIAGNOSIS — E66.01 SEVERE OBESITY (BMI >= 40): ICD-10-CM

## 2023-08-08 DIAGNOSIS — G47.30 SLEEP APNEA, UNSPECIFIED TYPE: ICD-10-CM

## 2023-08-08 DIAGNOSIS — Z12.31 SCREENING MAMMOGRAM FOR BREAST CANCER: ICD-10-CM

## 2023-08-08 PROCEDURE — 3074F PR MOST RECENT SYSTOLIC BLOOD PRESSURE < 130 MM HG: ICD-10-PCS | Mod: CPTII,GC,S$GLB,

## 2023-08-08 PROCEDURE — 1101F PT FALLS ASSESS-DOCD LE1/YR: CPT | Mod: CPTII,GC,S$GLB,

## 2023-08-08 PROCEDURE — 99215 PR OFFICE/OUTPT VISIT, EST, LEVL V, 40-54 MIN: ICD-10-PCS | Mod: GC,S$GLB,,

## 2023-08-08 PROCEDURE — 1101F PR PT FALLS ASSESS DOC 0-1 FALLS W/OUT INJ PAST YR: ICD-10-PCS | Mod: CPTII,GC,S$GLB,

## 2023-08-08 PROCEDURE — 3288F FALL RISK ASSESSMENT DOCD: CPT | Mod: CPTII,GC,S$GLB,

## 2023-08-08 PROCEDURE — 99999 PR PBB SHADOW E&M-EST. PATIENT-LVL IV: CPT | Mod: PBBFAC,GC,,

## 2023-08-08 PROCEDURE — 1159F MED LIST DOCD IN RCRD: CPT | Mod: CPTII,GC,S$GLB,

## 2023-08-08 PROCEDURE — 3078F DIAST BP <80 MM HG: CPT | Mod: CPTII,GC,S$GLB,

## 2023-08-08 PROCEDURE — 3288F PR FALLS RISK ASSESSMENT DOCUMENTED: ICD-10-PCS | Mod: CPTII,GC,S$GLB,

## 2023-08-08 PROCEDURE — 3008F BODY MASS INDEX DOCD: CPT | Mod: CPTII,GC,S$GLB,

## 2023-08-08 PROCEDURE — 1126F AMNT PAIN NOTED NONE PRSNT: CPT | Mod: CPTII,GC,S$GLB,

## 2023-08-08 PROCEDURE — 3008F PR BODY MASS INDEX (BMI) DOCUMENTED: ICD-10-PCS | Mod: CPTII,GC,S$GLB,

## 2023-08-08 PROCEDURE — 3078F PR MOST RECENT DIASTOLIC BLOOD PRESSURE < 80 MM HG: ICD-10-PCS | Mod: CPTII,GC,S$GLB,

## 2023-08-08 PROCEDURE — 4010F ACE/ARB THERAPY RXD/TAKEN: CPT | Mod: CPTII,GC,S$GLB,

## 2023-08-08 PROCEDURE — 3074F SYST BP LT 130 MM HG: CPT | Mod: CPTII,GC,S$GLB,

## 2023-08-08 PROCEDURE — 99999 PR PBB SHADOW E&M-EST. PATIENT-LVL IV: ICD-10-PCS | Mod: PBBFAC,GC,,

## 2023-08-08 PROCEDURE — 4010F PR ACE/ARB THEARPY RXD/TAKEN: ICD-10-PCS | Mod: CPTII,GC,S$GLB,

## 2023-08-08 PROCEDURE — 99215 OFFICE O/P EST HI 40 MIN: CPT | Mod: GC,S$GLB,,

## 2023-08-08 PROCEDURE — 1126F PR PAIN SEVERITY QUANTIFIED, NO PAIN PRESENT: ICD-10-PCS | Mod: CPTII,GC,S$GLB,

## 2023-08-08 PROCEDURE — 1159F PR MEDICATION LIST DOCUMENTED IN MEDICAL RECORD: ICD-10-PCS | Mod: CPTII,GC,S$GLB,

## 2023-08-08 RX ORDER — LIDOCAINE HYDROCHLORIDE 20 MG/ML
JELLY TOPICAL
Qty: 30 ML | Refills: 2 | Status: SHIPPED | OUTPATIENT
Start: 2023-08-08

## 2023-08-08 RX ORDER — OLMESARTAN MEDOXOMIL 40 MG/1
40 TABLET ORAL DAILY
Qty: 90 TABLET | Refills: 3 | Status: SHIPPED | OUTPATIENT
Start: 2023-08-08 | End: 2024-08-07

## 2023-08-08 NOTE — PROGRESS NOTES
I have interviewed and examined the patient w/ the resident,   I agree w/ the impression and plan as outlined above.      Ester Kaufman MD- Staff

## 2023-08-08 NOTE — PROGRESS NOTES
Clinic Note  8/8/2023      Subjective:       Patient ID:  patient is a 73 y.o. female being seen for an establish care visit.    Chief Complaint: Establish Care    HPI  Ms. James is a 73 year old female w/ HTN, venous insufficiency, preDM, CKD, dyslipidemia, obesity, IZABEL and LIANE who presents today to establish care. Denies acute complaints including HA, vision changes, SOB, CP, abdominal pain, urinary sxs, stool changes. Does reports seasonal allergies which are especially bad during this time of year. Allergies, family history, surgical history and social history reviewed.     HTN: Takes olmesartan-HCTZ 40-12.5mg every other day despite it being prescribed daily. Reports episodes of low BP in clinic. Does not measure BP at home. Endorses lightheadedness and dizziness at times. Enrolled in digital HTN program but states she was not contacted.     Venous insufficiency: Takes Bumex 0.5mg QD. Follows w. Dr. Lang w/ vascular medicine. Wears compression stockings occasionally. Adherant w/ low Na diet.     PreDM: Most recent A1C 6.2 in 10/2022. Has refused initiation of metformin in the past.     CKD3: Cr 1.2 and eGFR 47.8 in 10/2022.     Dyslipidemia: Lipid panel in 10/2022 w/ cholesterol 238, HDL 42 and .6. Was prescribed atorvastatin 40mg QD but has only taken it this past week.     Obesity: Reports losing approximately 40 lbs with diet and exercise. Was referred to bariatric medicine but did not see them. Would like to continue lifestyle modifications.    LIANE: Takes iron 325mg QD. Admitted in 2019 w/ acute blood loss anemia. Workup including EGD and colonoscopy unrevealing for source of bleed. Most recent CBC in 10/22 w/ Hgb 13.6.          Shingles: Due   Pneumo: Completed   Flu: Due in the fall.     Colonoscopy: completed 8/14/2019; polyp negative for malignancy. Next due in 10 years.   Mammogram: Due. Last completed 10/7/2022; BIRADS 1.   DEXA: Due    Review of Systems   Constitutional:  Negative for  chills and fever.   HENT:  Negative for congestion and sinus pain.    Eyes:  Negative for blurred vision and pain.   Respiratory:  Negative for cough and shortness of breath.    Cardiovascular:  Negative for chest pain and palpitations.   Gastrointestinal:  Negative for abdominal pain, diarrhea and vomiting.   Musculoskeletal:  Negative for joint pain and myalgias.   Skin:  Negative for itching and rash.   Neurological:  Negative for dizziness and headaches.   Psychiatric/Behavioral:  Negative for depression and suicidal ideas.        Medication List with Changes/Refills   Current Medications    ALBUTEROL (VENTOLIN HFA) 90 MCG/ACTUATION INHALER    Inhale 2 puffs into the lungs every 6 (six) hours as needed for Wheezing. Rescue    ATORVASTATIN (LIPITOR) 40 MG TABLET    Take 1 tablet (40 mg total) by mouth once daily.    BUMETANIDE (BUMEX) 0.5 MG TAB    Take 1 tablet (0.5 mg total) by mouth once daily.    CHOLECALCIFEROL, VITAMIN D3, 50,000 UNIT CAPSULE    TK 1 C PO WEEKLY    DICLOFENAC SODIUM (VOLTAREN) 1 % GEL    Apply 2 g topically 3 (three) times daily as needed (Muscle joint pain).    ERGOCALCIFEROL (ERGOCALCIFEROL) 50,000 UNIT CAP    TAKE 1 CAPSULE EVERY 7 DAYS    FERROUS SULFATE (FEROSUL) 325 MG (65 MG IRON) TAB TABLET    Take 1 tablet (325 mg total) by mouth once daily.    FLUOXETINE 10 MG CAPSULE    TAKE 2 CAPSULES(20 MG) BY MOUTH EVERY DAY    FLUTICASONE PROPIONATE (FLONASE) 50 MCG/ACTUATION NASAL SPRAY    1 spray (50 mcg total) by Each Nostril route once daily.    IBUPROFEN (ADVIL,MOTRIN) 200 MG TABLET    Take 200 mg by mouth every 6 (six) hours as needed for Pain.    LIDOCAINE (LIDODERM) 5 %    Place 1 patch onto the skin once daily. Remove & Discard patch within 12 hours or as directed by MD    LIDOCAINE HCL 2% (XYLOCAINE) 2 % JELLY    Apply topically as needed. Apply topically once nightly to affected part of foot/feet.    MONTELUKAST (SINGULAIR) 10 MG TABLET    Take 1 tablet (10 mg total) by mouth once  "daily.    MULTIVIT-MIN-IRON-FA-LUTEIN (CENTRUM SILVER WOMEN) 8 MG IRON-400 MCG-300 MCG TAB    Take 1 tablet by mouth once daily.    OLMESARTAN-HYDROCHLOROTHIAZIDE (BENICAR HCT) 40-12.5 MG TAB    Take one tab po daily   Discontinued Medications    BENZONATATE (TESSALON) 100 MG CAPSULE    Take 1 capsule (100 mg total) by mouth 3 (three) times daily as needed for Cough.       Patient Active Problem List   Diagnosis    HTN (hypertension)    Pre-diabetes    Severe obesity (BMI >= 40)    Vitamin D deficiency    Iron deficiency anemia    History of gastrointestinal ulcer    Anemia    Venous reflux    Lymphedema of both lower extremities    Venous insufficiency of both lower extremities    Edema of both lower extremities    Mixed hyperlipidemia    DDD (degenerative disc disease), lumbar    Chronic bilateral low back pain without sciatica             Health Maintenance Topics with due status: Not Due       Topic Last Completion Date    Colorectal Cancer Screening 08/14/2019    TETANUS VACCINE 05/17/2020    Hemoglobin A1c (Prediabetes) 10/07/2022    Lipid Panel 10/07/2022    Influenza Vaccine Not Due       Objective:      /70 (BP Location: Right arm, Patient Position: Sitting, BP Method: Large (Manual))   Pulse 72   Temp 97.4 °F (36.3 °C) (Temporal)   Resp 18   Ht 5' 5" (1.651 m)   Wt 111.1 kg (244 lb 14.9 oz)   LMP  (LMP Unknown)   SpO2 98%   BMI 40.76 kg/m²   Estimated body mass index is 40.76 kg/m² as calculated from the following:    Height as of this encounter: 5' 5" (1.651 m).    Weight as of this encounter: 111.1 kg (244 lb 14.9 oz).  Physical Exam  Constitutional:       Appearance: Normal appearance. She is obese.   HENT:      Head: Normocephalic and atraumatic.      Right Ear: Tympanic membrane, ear canal and external ear normal.      Left Ear: Tympanic membrane, ear canal and external ear normal.      Mouth/Throat:      Mouth: Mucous membranes are moist.      Pharynx: Oropharynx is clear.   Eyes:      " Extraocular Movements: Extraocular movements intact.      Conjunctiva/sclera: Conjunctivae normal.   Cardiovascular:      Rate and Rhythm: Normal rate.      Heart sounds: Normal heart sounds. No murmur heard.  Pulmonary:      Effort: Pulmonary effort is normal.      Breath sounds: No wheezing.   Abdominal:      General: Abdomen is flat. There is no distension.   Musculoskeletal:         General: Normal range of motion.      Right lower leg: Edema present.      Left lower leg: Edema present.   Neurological:      General: No focal deficit present.      Mental Status: She is alert and oriented to person, place, and time.   Psychiatric:         Mood and Affect: Mood normal.         Behavior: Behavior normal.           Assessment and Plan:     Encounter to establish care w/ new doctor  - Annual labs ordered  -     DXA Bone Density Axial Skeleton 1 or more sites; Future; Expected date: 08/08/2023  -     Ambulatory referral/consult to Optometry; Future; Expected date: 08/15/2023    Primary hypertension  - Given patient's recent weight loss and reports of symptomatic hypotension, will discontinue olmesartan-HCTZ combination and start olmesartan 40mg QD. Asked patient to measure BP daily for the next 1-2 weeks and send me a log to determine if further modifications need to be made.   -     COMPREHENSIVE METABOLIC PANEL; Future; Expected date: 08/08/2023  -     TSH; Future; Expected date: 08/08/2023  -     olmesartan (BENICAR) 40 MG tablet; Take 1 tablet (40 mg total) by mouth once daily.  Dispense: 90 tablet; Refill: 3    Venous insufficiency of both lower extremities  - stable; continue Bumex 0.5mg QD and lifestyle modifications     Prediabetes  -     Hemoglobin A1C; Future; Expected date: 08/08/2023    Stage 3a chronic kidney disease  -     Hemoglobin A1C; Future; Expected date: 08/08/2023    Mixed hyperlipidemia  - Discussed importance of taking statin QD for secondary prevention given existing comorbidities.   -      Lipid Panel; Future; Expected date: 08/08/2023    Severe obesity (BMI >= 40)  - continue w/ lifestyle modifications including diet and exercise    Other iron deficiency anemia  -     CBC W/ AUTO DIFFERENTIAL; Future; Expected date: 08/08/2023    Screening mammogram for breast cancer  -     Mammo Digital Screening Bilat w/ Tony; Future; Expected date: 10/16/2023    Asymptomatic menopausal state  -     DXA Bone Density Axial Skeleton 1 or more sites; Future; Expected date: 08/08/2023    Other orders  -     LIDOcaine HCL 2% (XYLOCAINE) 2 % jelly; Apply topically as needed. Apply topically once nightly to affected part of foot/feet.  Dispense: 30 mL; Refill: 2      Follow Up:   No follow-ups on file.        Time spent today: > 40 minutes on H&P, MRR, and NELDA Timmons M.D.  Internal Medicine PGY-3  [facility]

## 2023-09-01 DIAGNOSIS — J30.2 SEASONAL ALLERGIES: ICD-10-CM

## 2023-09-01 DIAGNOSIS — E55.9 VITAMIN D DEFICIENCY: ICD-10-CM

## 2023-09-03 RX ORDER — MONTELUKAST SODIUM 10 MG/1
10 TABLET ORAL DAILY
Qty: 90 TABLET | Refills: 3 | Status: SHIPPED | OUTPATIENT
Start: 2023-09-03

## 2023-10-02 ENCOUNTER — LAB VISIT (OUTPATIENT)
Dept: LAB | Facility: OTHER | Age: 73
End: 2023-10-02
Attending: INTERNAL MEDICINE
Payer: MEDICARE

## 2023-10-02 DIAGNOSIS — E78.2 MIXED HYPERLIPIDEMIA: ICD-10-CM

## 2023-10-02 LAB
CHOLEST SERPL-MCNC: 171 MG/DL (ref 120–199)
CHOLEST/HDLC SERPL: 3.8 {RATIO} (ref 2–5)
HDLC SERPL-MCNC: 45 MG/DL (ref 40–75)
HDLC SERPL: 26.3 % (ref 20–50)
LDLC SERPL CALC-MCNC: 113.8 MG/DL (ref 63–159)
NONHDLC SERPL-MCNC: 126 MG/DL
TRIGL SERPL-MCNC: 61 MG/DL (ref 30–150)

## 2023-10-02 PROCEDURE — 80061 LIPID PANEL: CPT | Performed by: INTERNAL MEDICINE

## 2023-10-02 PROCEDURE — 36415 COLL VENOUS BLD VENIPUNCTURE: CPT | Performed by: INTERNAL MEDICINE

## 2023-11-08 ENCOUNTER — HOSPITAL ENCOUNTER (OUTPATIENT)
Dept: RADIOLOGY | Facility: OTHER | Age: 73
Discharge: HOME | End: 2023-11-08
Payer: MEDICARE

## 2023-11-08 DIAGNOSIS — Z12.31 SCREENING MAMMOGRAM FOR BREAST CANCER: ICD-10-CM

## 2023-11-08 PROCEDURE — 77063 MAMMO DIGITAL SCREENING BILAT WITH TOMO: ICD-10-PCS | Mod: 26,,, | Performed by: RADIOLOGY

## 2023-11-08 PROCEDURE — 77067 MAMMO DIGITAL SCREENING BILAT WITH TOMO: ICD-10-PCS | Mod: 26,,, | Performed by: RADIOLOGY

## 2023-11-08 PROCEDURE — 77067 SCR MAMMO BI INCL CAD: CPT | Mod: 26,,, | Performed by: RADIOLOGY

## 2023-11-08 PROCEDURE — 77067 SCR MAMMO BI INCL CAD: CPT | Mod: TC

## 2023-11-08 PROCEDURE — 77063 BREAST TOMOSYNTHESIS BI: CPT | Mod: 26,,, | Performed by: RADIOLOGY

## 2023-11-13 RX ORDER — BUMETANIDE 0.5 MG/1
0.5 TABLET ORAL
Qty: 90 TABLET | Refills: 10 | Status: SHIPPED | OUTPATIENT
Start: 2023-11-13

## 2023-12-13 ENCOUNTER — OFFICE VISIT (OUTPATIENT)
Dept: URGENT CARE | Facility: CLINIC | Age: 73
End: 2023-12-13
Payer: MEDICARE

## 2023-12-13 VITALS
BODY MASS INDEX: 40.65 KG/M2 | HEART RATE: 75 BPM | OXYGEN SATURATION: 95 % | SYSTOLIC BLOOD PRESSURE: 176 MMHG | DIASTOLIC BLOOD PRESSURE: 95 MMHG | TEMPERATURE: 99 F | RESPIRATION RATE: 20 BRPM | HEIGHT: 65 IN | WEIGHT: 244 LBS

## 2023-12-13 DIAGNOSIS — J06.9 VIRAL URI WITH COUGH: ICD-10-CM

## 2023-12-13 DIAGNOSIS — R05.9 COUGH, UNSPECIFIED TYPE: Primary | ICD-10-CM

## 2023-12-13 LAB
CTP QC/QA: YES
SARS-COV-2 AG RESP QL IA.RAPID: NEGATIVE

## 2023-12-13 PROCEDURE — 87811 SARS-COV-2 COVID19 W/OPTIC: CPT | Mod: QW,S$GLB,,

## 2023-12-13 PROCEDURE — 99213 PR OFFICE/OUTPT VISIT, EST, LEVL III, 20-29 MIN: ICD-10-PCS | Mod: S$GLB,,,

## 2023-12-13 PROCEDURE — 99213 OFFICE O/P EST LOW 20 MIN: CPT | Mod: S$GLB,,,

## 2023-12-13 PROCEDURE — 87811 SARS CORONAVIRUS 2 ANTIGEN POCT, MANUAL READ: ICD-10-PCS | Mod: QW,S$GLB,,

## 2023-12-13 RX ORDER — IPRATROPIUM BROMIDE 21 UG/1
2 SPRAY, METERED NASAL 2 TIMES DAILY
Qty: 15.8 ML | Refills: 0 | Status: SHIPPED | OUTPATIENT
Start: 2023-12-13 | End: 2024-01-12

## 2023-12-13 RX ORDER — BENZONATATE 200 MG/1
200 CAPSULE ORAL 3 TIMES DAILY PRN
Qty: 30 CAPSULE | Refills: 0 | Status: SHIPPED | OUTPATIENT
Start: 2023-12-13 | End: 2023-12-23

## 2023-12-13 NOTE — PROGRESS NOTES
"Subjective:      Patient ID: Amanda James is a 73 y.o. female.    Vitals:  height is 5' 5" (1.651 m) and weight is 110.7 kg (244 lb). Her temperature is 98.6 °F (37 °C). Her blood pressure is 176/95 (abnormal) and her pulse is 75. Her respiration is 20 and oxygen saturation is 95%.     Chief Complaint: Sinus Problem    This is a 73 y.o. female who presents today with a chief complaint of a sinus problem that began four days ago.     Sinus Problem  This is a new problem. The current episode started in the past 7 days. The problem has been gradually worsening since onset. There has been no fever. Her pain is at a severity of 0/10. She is experiencing no pain. Associated symptoms include congestion and coughing. Pertinent negatives include no chills, diaphoresis, ear pain, headaches, hoarse voice, neck pain, shortness of breath, sinus pressure, sneezing, sore throat or swollen glands. Treatments tried: coricidin, flonase and singulair. The treatment provided mild relief.       Constitution: Negative for appetite change, chills, sweating and fever.   HENT:  Positive for congestion and sinus pain. Negative for ear pain, sinus pressure and sore throat.    Neck: Negative for neck pain.   Cardiovascular:  Negative for chest pain and sob on exertion.   Eyes:  Negative for eye trauma, eye discharge and eye redness.   Respiratory:  Positive for cough. Negative for sputum production and shortness of breath.    Gastrointestinal:  Negative for abdominal pain, nausea, vomiting, constipation and diarrhea.   Genitourinary:  Negative for dysuria, frequency, urgency and urine decreased.   Musculoskeletal:  Negative for pain and muscle ache.   Skin:  Negative for color change and rash.   Allergic/Immunologic: Negative for sneezing.   Neurological:  Negative for dizziness, light-headedness, headaches and altered mental status.   Psychiatric/Behavioral:  Negative for altered mental status and confusion.       Past Medical History: "   Diagnosis Date    Allergy     Asthma     Hyperlipidemia     Hypertension     IZABEL (obstructive sleep apnea)     Pre-diabetes        Past Surgical History:   Procedure Laterality Date    BREAST BIOPSY Left     benign    BREAST MASS EXCISION Left     benign    COLONOSCOPY N/A 2019    Procedure: COLONOSCOPY;  Surgeon: Clinton Gottlieb MD;  Location: [facility] ([address] [place] [address]);  Service: Endoscopy;  Laterality: N/A;  BMI 47.45  IZABEL    ESOPHAGOGASTRODUODENOSCOPY N/A 2019    Procedure: EGD (ESOPHAGOGASTRODUODENOSCOPY);  Surgeon: Clinton Gottlieb MD;  Location: [facility] ([address] [place] [address]);  Service: Endoscopy;  Laterality: N/A;  BMI 47.45  IZABEL    HYSTERECTOMY      PARTIAL HYSTERECTOMY      uterus removed       Family History   Problem Relation Age of Onset    Hypertension Mother     Coronary artery disease Father     Coronary artery disease Brother     Breast cancer Paternal Aunt     Colon cancer Neg Hx     Inflammatory bowel disease Neg Hx     Crohn's disease Neg Hx     Ulcerative colitis Neg Hx        Social History     Socioeconomic History    Marital status: Single   Tobacco Use    Smoking status: Former     Current packs/day: 0.00     Types: Cigarettes     Quit date: 1999     Years since quittin.5    Smokeless tobacco: Never   Substance and Sexual Activity    Alcohol use: Yes     Comment: socially/ rare    Drug use: No    Sexual activity: Not Currently       Current Outpatient Medications   Medication Sig Dispense Refill    albuterol (VENTOLIN HFA) 90 mcg/actuation inhaler Inhale 2 puffs into the lungs every 6 (six) hours as needed for Wheezing. Rescue (Patient not taking: Reported on 2023) 1 g 1    atorvastatin (LIPITOR) 40 MG tablet Take 1 tablet (40 mg total) by mouth once daily. 90 tablet 3    benzonatate (TESSALON) 200 MG capsule Take 1 capsule (200 mg total) by mouth 3 (three) times daily as needed. 30 capsule 0    bumetanide (BUMEX) 0.5 MG Tab TAKE 1 TABLET ONE TIME DAILY 90 tablet 10     cholecalciferol, vitamin D3, 50,000 unit capsule TK 1 C PO WEEKLY  3    diclofenac sodium (VOLTAREN) 1 % Gel Apply 2 g topically 3 (three) times daily as needed (Muscle joint pain). 100 g 0    ergocalciferol (ERGOCALCIFEROL) 50,000 unit Cap TAKE 1 CAPSULE EVERY 7 DAYS 12 capsule 3    ferrous sulfate (FEROSUL) 325 mg (65 mg iron) Tab tablet Take 1 tablet (325 mg total) by mouth once daily. 90 tablet 3    fluticasone propionate (FLONASE) 50 mcg/actuation nasal spray 1 spray (50 mcg total) by Each Nostril route once daily. 16 g 11    ipratropium (ATROVENT) 21 mcg (0.03 %) nasal spray 2 sprays by Each Nostril route 2 (two) times daily. 15.8 mL 0    LIDOcaine HCL 2% (XYLOCAINE) 2 % jelly Apply topically as needed. Apply topically once nightly to affected part of foot/feet. 30 mL 2    montelukast (SINGULAIR) 10 mg tablet Take 1 tablet (10 mg total) by mouth once daily. 90 tablet 3    olmesartan (BENICAR) 40 MG tablet Take 1 tablet (40 mg total) by mouth once daily. 90 tablet 3     No current facility-administered medications for this visit.       Review of patient's allergies indicates:   Allergen Reactions    Aspirin Other (See Comments)      Objective:     Physical Exam   Constitutional: She is oriented to person, place, and time. She appears well-developed. She is cooperative.  Non-toxic appearance. She does not appear ill. No distress.      Comments:Pt sitting erect on examination table. No acute respiratory distress, no use of accessory muscles, no notice of nasal flaring.       HENT:   Head: Normocephalic and atraumatic.   Ears:   Right Ear: Hearing, tympanic membrane, external ear and ear canal normal.   Left Ear: Hearing, tympanic membrane, external ear and ear canal normal.   Nose: Nose normal. No mucosal edema, rhinorrhea, nasal deformity or congestion. No epistaxis. Right sinus exhibits no maxillary sinus tenderness and no frontal sinus tenderness. Left sinus exhibits no maxillary sinus tenderness and no  frontal sinus tenderness.   Mouth/Throat: Uvula is midline, oropharynx is clear and moist and mucous membranes are normal. Mucous membranes are moist. No trismus in the jaw. Normal dentition. No uvula swelling. No oropharyngeal exudate, posterior oropharyngeal edema or posterior oropharyngeal erythema. Oropharynx is clear.   Eyes: Conjunctivae and lids are normal. Pupils are equal, round, and reactive to light. No scleral icterus. Extraocular movement intact   Neck: Trachea normal and phonation normal. Neck supple. No edema present. No erythema present. No neck rigidity present.   Cardiovascular: Normal rate, regular rhythm, normal heart sounds and normal pulses.   Pulmonary/Chest: Effort normal and breath sounds normal. No accessory muscle usage. No apnea, no tachypnea and no bradypnea. No respiratory distress. She has no decreased breath sounds. She has no wheezes. She has no rhonchi.   Lungs clear to auscultation B/L          Comments: Lungs clear to auscultation B/L     Abdominal: Normal appearance.   Musculoskeletal: Normal range of motion.         General: No deformity. Normal range of motion.   Lymphadenopathy:     She has no cervical adenopathy.   Neurological: She is alert and oriented to person, place, and time. She exhibits normal muscle tone. Coordination normal.   Skin: Skin is warm, dry, intact, not diaphoretic and not pale.   Psychiatric: Her speech is normal and behavior is normal. Judgment and thought content normal.   Nursing note and vitals reviewed.    Results for orders placed or performed in visit on 12/13/23   SARS Coronavirus 2 Antigen, POCT Manual Read   Result Value Ref Range    SARS Coronavirus 2 Antigen Negative Negative     Acceptable Yes       Assessment:     1. Cough, unspecified type    2. Viral URI with cough        Plan:     I have reviewed the patient chart and pertinent past imaging/labs.   Cough, unspecified type  -     SARS Coronavirus 2 Antigen, POCT Manual  Read  -     benzonatate (TESSALON) 200 MG capsule; Take 1 capsule (200 mg total) by mouth 3 (three) times daily as needed.  Dispense: 30 capsule; Refill: 0    Viral URI with cough  -     ipratropium (ATROVENT) 21 mcg (0.03 %) nasal spray; 2 sprays by Each Nostril route 2 (two) times daily.  Dispense: 15.8 mL; Refill: 0  -     benzonatate (TESSALON) 200 MG capsule; Take 1 capsule (200 mg total) by mouth 3 (three) times daily as needed.  Dispense: 30 capsule; Refill: 0

## 2023-12-13 NOTE — PATIENT INSTRUCTIONS
Use Atrovent twice daily, as needed for nasal congestion.   Take Tessalon Pearls up to 3 times daily as needed for cough.   Continue over the counter medication for symptomatic relief: Tylenol Cold and Sinus  Continue taking singular and using flonase daily.  Monitor for chest pain, shortness of breath, fever unresponsive to medication, or worsening of symptoms.   If no improvement in 5-7 days, please return or follow up with primary care for further evaluation.   Please drink plenty of fluids.  Please get plenty of rest.  Please return here or go to the Emergency Department for any concerns or worsening of condition.  If you were prescribed antibiotics, please take them to completion.  If you were given wait & see antibiotics, please wait 5-7 days before taking them, and only take them if your symptoms have worsened or not improved.  If you do begin taking the antibiotics, please take them to completion.  If you were prescribed a narcotic medication, do not drive or operate heavy equipment or machinery while taking these medications.  If you were given a steroid shot in the clinic and have also been given a prescription for a steroid such as Prednisone or a Medrol Dose Pack, please begin taking them tomorrow.  If you do not have Hypertension or any history of palpitations, it is ok to take over the counter Sudafed or Mucinex D or Allegra-D or Claritin-D or Zyrtec-D.  If you do take one of the above, it is ok to combine that with plain over the counter Mucinex or Allegra or Claritin or Zyrtec.  If for example you are taking Zyrtec -D, you can combine that with Mucinex, but not Mucinex-D.  If you are taking Mucinex-D, you can combine that with plain Allegra or Claritin or Zyrtec.   If you do have Hypertension or palpitations, it is safe to take Coricidin HBP for relief of sinus symptoms.  If not allergic, please take over the counter Tylenol (Acetaminophen) and/or Motrin (Ibuprofen) as directed for control of pain  and/or fever.  Please follow up with your primary care doctor or specialist as needed.    If you  smoke, please stop smoking.

## 2024-02-19 ENCOUNTER — OFFICE VISIT (OUTPATIENT)
Dept: CARDIOLOGY | Facility: CLINIC | Age: 74
End: 2024-02-19
Payer: MEDICARE

## 2024-02-19 VITALS
DIASTOLIC BLOOD PRESSURE: 76 MMHG | WEIGHT: 254.44 LBS | SYSTOLIC BLOOD PRESSURE: 136 MMHG | BODY MASS INDEX: 42.39 KG/M2 | HEIGHT: 65 IN | HEART RATE: 74 BPM

## 2024-02-19 DIAGNOSIS — E66.01 SEVERE OBESITY (BMI >= 40): ICD-10-CM

## 2024-02-19 DIAGNOSIS — I89.0 LYMPHEDEMA OF BOTH LOWER EXTREMITIES: Primary | ICD-10-CM

## 2024-02-19 DIAGNOSIS — E78.2 MIXED HYPERLIPIDEMIA: ICD-10-CM

## 2024-02-19 DIAGNOSIS — I87.2 VENOUS REFLUX: ICD-10-CM

## 2024-02-19 DIAGNOSIS — I10 PRIMARY HYPERTENSION: Chronic | ICD-10-CM

## 2024-02-19 DIAGNOSIS — I87.2 VENOUS INSUFFICIENCY OF BOTH LOWER EXTREMITIES: ICD-10-CM

## 2024-02-19 PROCEDURE — 99999 PR PBB SHADOW E&M-EST. PATIENT-LVL III: CPT | Mod: PBBFAC,,, | Performed by: INTERNAL MEDICINE

## 2024-02-19 PROCEDURE — 3078F DIAST BP <80 MM HG: CPT | Mod: CPTII,S$GLB,, | Performed by: INTERNAL MEDICINE

## 2024-02-19 PROCEDURE — 4010F ACE/ARB THERAPY RXD/TAKEN: CPT | Mod: CPTII,S$GLB,, | Performed by: INTERNAL MEDICINE

## 2024-02-19 PROCEDURE — 3288F FALL RISK ASSESSMENT DOCD: CPT | Mod: CPTII,S$GLB,, | Performed by: INTERNAL MEDICINE

## 2024-02-19 PROCEDURE — 3075F SYST BP GE 130 - 139MM HG: CPT | Mod: CPTII,S$GLB,, | Performed by: INTERNAL MEDICINE

## 2024-02-19 PROCEDURE — 1125F AMNT PAIN NOTED PAIN PRSNT: CPT | Mod: CPTII,S$GLB,, | Performed by: INTERNAL MEDICINE

## 2024-02-19 PROCEDURE — 99215 OFFICE O/P EST HI 40 MIN: CPT | Mod: S$GLB,,, | Performed by: INTERNAL MEDICINE

## 2024-02-19 PROCEDURE — 1159F MED LIST DOCD IN RCRD: CPT | Mod: CPTII,S$GLB,, | Performed by: INTERNAL MEDICINE

## 2024-02-19 PROCEDURE — 1101F PT FALLS ASSESS-DOCD LE1/YR: CPT | Mod: CPTII,S$GLB,, | Performed by: INTERNAL MEDICINE

## 2024-02-19 NOTE — PROGRESS NOTES
[facility]      Chief Complaint   Patient presents with    Edema    Hypertension       Patient ID: Amanda James is a 73 y.o. female with BLE lymphedema, venous insufficiency, HTN, HLD, IZABEL (not on CPAP), morbid obesity, who presents for a follow up appointment.  Pertinent history/events are as follows:    -Pt kindly referred by Dr. Metcalf for evaluation of Venous reflux.    -At our initial clinic visit on 9/15/2022, Mrs. James reported leg swelling for several years.  She hs no claudication or tissue loss.  No smoking history.  BLE Venous Reflux Study on 8/2/2022 revealed hemodynamically significant venous reflux bilaterally with no evidence of DVT.   Plan:   BLE Lymphedema and Venous Reflux- Refer to lymphedema clinic.  Start bumex 0.5 mg daily.  Check  cmp in 1 week.  Pt to limit sodium intake to 2,000 mg daily.  Limit volume intake to 1.5 liters daily.  Elevate legs when resting.  HTN- Continue current medications.   HLD- Continue updated lipid panel.     Morbid Obesity- Encourage diet, exercise and weight loss.     -Follow up addendum 12/6/2022:  Mrs James has been compliant with compression of 20-30mmHg, elevation, and exercise for at least 4 weeks yet symptoms persists.     -At follow up clinic visit on 1/17/2023, Mrs. James reported significant improvement in BLE edema since starting lymphedema clinic therapy.  Pt states she did not start atorvastatin 40 mg daily as prescribed.   on 10/7/2022.    Plan:   BLE Lymphedema and Venous Reflux- Mrs. James reports significant improvement in BLE edema since starting lymphedema clinic therapy.  Continue lymphedema clinic therapy.  Continue bumex 0.5 mg daily.  Pt to limit sodium intake to 2,000 mg daily.  Limit volume intake to 1.5 liters daily.  Elevate legs when resting.  HTN- Continue current medications.   HLD- Pt states she did not start atorvastatin 40 mg daily as prescribed.   on 10/7/2022.  Pt encourage to  start atorvastatin 40 mg daily as prescribed.      Morbid Obesity- Encourage diet, exercise and weight loss.     HPI:  Mrs. James reports continued improvement in BLE edema.  Pt states she is now taking atorvastatin 40 mg daily as prescribed.   on 10/2/2023.     Past Medical History:   Diagnosis Date    Allergy     Asthma     Hyperlipidemia     Hypertension     IZABEL (obstructive sleep apnea)     Pre-diabetes      Past Surgical History:   Procedure Laterality Date    BREAST BIOPSY Left     benign    BREAST MASS EXCISION Left     benign    COLONOSCOPY N/A 2019    Procedure: COLONOSCOPY;  Surgeon: Clinton Gottlieb MD;  Location: [facility] ([address] [place] [address]);  Service: Endoscopy;  Laterality: N/A;  BMI 47.45  IZABEL    ESOPHAGOGASTRODUODENOSCOPY N/A 2019    Procedure: EGD (ESOPHAGOGASTRODUODENOSCOPY);  Surgeon: Clinton Gottlieb MD;  Location: [facility] ([address] [place] [address]);  Service: Endoscopy;  Laterality: N/A;  BMI 47.45  IZABEL    HYSTERECTOMY      PARTIAL HYSTERECTOMY      uterus removed     Social History     Socioeconomic History    Marital status: Single   Tobacco Use    Smoking status: Former     Current packs/day: 0.00     Types: Cigarettes     Quit date: 1999     Years since quittin.7    Smokeless tobacco: Never   Substance and Sexual Activity    Alcohol use: Yes     Comment: socially/ rare    Drug use: No    Sexual activity: Not Currently     Family History   Problem Relation Age of Onset    Hypertension Mother     Coronary artery disease Father     Coronary artery disease Brother     Breast cancer Paternal Aunt     Colon cancer Neg Hx     Inflammatory bowel disease Neg Hx     Crohn's disease Neg Hx     Ulcerative colitis Neg Hx        Review of patient's allergies indicates:   Allergen Reactions    Aspirin Other (See Comments)       Medication List with Changes/Refills   Current Medications    ALBUTEROL (VENTOLIN HFA) 90 MCG/ACTUATION INHALER    Inhale 2 puffs into the lungs every 6 (six) hours as needed  "for Wheezing. Rescue    ATORVASTATIN (LIPITOR) 40 MG TABLET    Take 1 tablet (40 mg total) by mouth once daily.    BUMETANIDE (BUMEX) 0.5 MG TAB    TAKE 1 TABLET ONE TIME DAILY    CHOLECALCIFEROL, VITAMIN D3, 50,000 UNIT CAPSULE    TK 1 C PO WEEKLY    ERGOCALCIFEROL (ERGOCALCIFEROL) 50,000 UNIT CAP    TAKE 1 CAPSULE EVERY 7 DAYS    FERROUS SULFATE (FEROSUL) 325 MG (65 MG IRON) TAB TABLET    Take 1 tablet (325 mg total) by mouth once daily.    FLUTICASONE PROPIONATE (FLONASE) 50 MCG/ACTUATION NASAL SPRAY    1 spray (50 mcg total) by Each Nostril route once daily.    LIDOCAINE HCL 2% (XYLOCAINE) 2 % JELLY    Apply topically as needed. Apply topically once nightly to affected part of foot/feet.    MONTELUKAST (SINGULAIR) 10 MG TABLET    Take 1 tablet (10 mg total) by mouth once daily.    OLMESARTAN (BENICAR) 40 MG TABLET    Take 1 tablet (40 mg total) by mouth once daily.   Discontinued Medications    DICLOFENAC SODIUM (VOLTAREN) 1 % GEL    Apply 2 g topically 3 (three) times daily as needed (Muscle joint pain).       Review of Systems  Constitution: Denies chills, fever, and sweats.  HENT: Denies headaches or blurry vision.  Cardiovascular: Denies chest pain or irregular heart beat.  Respiratory: Denies cough or shortness of breath.  Gastrointestinal: Denies abdominal pain, nausea, or vomiting.  Musculoskeletal: Positive for leg swelling.  Neurological: Denies dizziness or focal weakness.  Psychiatric/Behavioral: Normal mental status.  Hematologic/Lymphatic: Denies bleeding problem or easy bruising/bleeding.  Skin: Denies rash or suspicious lesions    Physical Examination  /76   Pulse 74   Ht 5' 5" (1.651 m)   Wt 115.4 kg (254 lb 6.6 oz)   LMP  (LMP Unknown)   BMI 42.34 kg/m²     Constitutional: No acute distress, conversant  HEENT: Sclera anicteric, Pupils equal, round and reactive to light, extraocular motions intact, Oropharynx clear  Neck: No JVD, no carotid bruits  Cardiovascular: regular rate and " "rhythm, no murmur, rubs or gallops, normal S1/S2  Pulmonary: Clear to auscultation bilaterally  Abdominal: Abdomen soft, nontender, nondistended, positive bowel sounds  Extremities: BLE's with 1+ pitting edema, venous stasis dermatitis, and changes consistent with lymphedema (L>R)  Pulses:  Carotid pulses are 2+ on the right side, and 2+ on the left side.  Radial pulses are 2+ on the right side, and 2+ on the left side.   Femoral pulses are 2+ on the right side, and 2+ on the left side.  Popliteal pulses are 2+ on the right side, and 2+ on the left side.   Dorsalis pedis pulses are 2+ on the right side, and 2+ on the left side.   Posterior tibial pulses are 2+ on the right side, and 2+ on the left side.    Skin: No ecchymosis, erythema, or ulcers  Psych: Alert and oriented x 3, appropriate affect  Neuro: CNII-XII intact, no focal deficits    Labs:  Most Recent Data  CBC:   Lab Results   Component Value Date    WBC 8.71 10/07/2022    HGB 13.6 10/07/2022    HCT 44.1 10/07/2022     10/07/2022    MCV 98 10/07/2022    RDW 13.4 10/07/2022     BMP:   Lab Results   Component Value Date     10/07/2022    K 4.3 10/07/2022     10/07/2022    CO2 29 10/07/2022    BUN 28 (H) 10/07/2022    CREATININE 1.2 10/07/2022     10/07/2022    CALCIUM 10.0 10/07/2022    MG 1.9 07/27/2019    PHOS 2.8 07/27/2019     LFTS;   Lab Results   Component Value Date    PROT 6.9 10/07/2022    ALBUMIN 3.4 (L) 10/07/2022    BILITOT 0.7 10/07/2022    AST 18 10/07/2022    ALKPHOS 85 10/07/2022    ALT 26 10/07/2022     COAGS: No results found for: "INR", "PROTIME", "PTT"  FLP:   Lab Results   Component Value Date    CHOL 171 10/02/2023    HDL 45 10/02/2023    LDLCALC 113.8 10/02/2023    TRIG 61 10/02/2023    CHOLHDL 26.3 10/02/2023     CARDIAC:   Lab Results   Component Value Date    TROPONINI <0.006 10/07/2021       Imaging:    BLE Venous Reflux Study 8/2/2022:  No DVT seen.   Reflux bilaterally.    Assessment/Plan:  Amanda " Jacob is a 73 y.o. female with BLE lymphedema, venous insufficiency, HTN, HLD, IZABEL (not on CPAP), morbid obesity, who presents for a follow up appointment.    BLE Lymphedema and Venous Reflux- Mrs. James reports continued improvement in BLE edema.  Refer for lymphedema clinic therapy.  Continue bumex 0.5 mg daily.  Pt to limit sodium intake to 2,000 mg daily.  Limit volume intake to 1.5 liters daily.  Elevate legs when resting.    2. HTN- Continue current medications.     3. HLD- Pt states she is now taking atorvastatin 40 mg daily as prescribed.   on 10/2/2023.  Continue atorvastatin 40 mg daily.        4. Morbid Obesity- Encourage diet, exercise and weight loss.     Follow up in 4 months with lipids prior    Total duration of face to face visit time 30 minutes.  Total time spent counseling greater than fifty percent of total visit time.  Counseling included discussion regarding imaging findings, diagnosis, possibilities, treatment options, risks and benefits.  The patient had many questions regarding the options and long-term effects.    Giuseppe Lang MD, PhD  Interventional Cardiology

## 2024-02-19 NOTE — PATIENT INSTRUCTIONS
Assessment/Plan:  Amanda James is a 73 y.o. female with BLE lymphedema, venous insufficiency, HTN, HLD, IZABEL (not on CPAP), morbid obesity, who presents for a follow up appointment.    BLE Lymphedema and Venous Reflux- Mrs. James reports continued improvement in BLE edema.  Refer for lymphedema clinic therapy.  Continue bumex 0.5 mg daily.  Pt to limit sodium intake to 2,000 mg daily.  Limit volume intake to 1.5 liters daily.  Elevate legs when resting.    2. HTN- Continue current medications.     3. HLD- Pt states she is now taking atorvastatin 40 mg daily as prescribed.   on 10/2/2023.  Continue atorvastatin 40 mg daily.        4. Morbid Obesity- Encourage diet, exercise and weight loss.     Follow up in 4 months with lipids prior

## 2024-06-03 NOTE — TELEPHONE ENCOUNTER
----- Message from Cassi Erickson sent at 6/3/2024 12:01 PM CDT -----  Contact: Philipp @[facility] [phone]  Requesting an RX refill or new RX.    Is this a refill or new RX: refill     RX name and strength (copy/paste from chart):  olmesartan (BENICAR) 40 MG tablet     Is this a 30 day or 90 day RX: 90    Pharmacy name and phone # (copy/paste from chart):      [facility] - [place], [place] - [address]  [address] [address]  Phone: [phone] Fax: [phone]

## 2024-06-04 RX ORDER — OLMESARTAN MEDOXOMIL 40 MG/1
40 TABLET ORAL DAILY
Qty: 90 TABLET | Refills: 3 | Status: SHIPPED | OUTPATIENT
Start: 2024-06-04 | End: 2025-06-04

## 2024-06-14 ENCOUNTER — TELEPHONE (OUTPATIENT)
Dept: PRIMARY CARE CLINIC | Facility: CLINIC | Age: 74
End: 2024-06-14
Payer: MEDICARE

## 2024-06-14 NOTE — TELEPHONE ENCOUNTER
Appt made for next week with Dr. Arredondo - pt states that she lives between her 2 [facility] and [place]

## 2024-06-14 NOTE — TELEPHONE ENCOUNTER
----- Message from Nidia Samaniego sent at 6/13/2024  4:55 PM CDT -----  Type:  Sooner Apoointment Request    Caller is requesting a sooner appointment.  Caller declined first available appointment listed below.  Caller will not accept being placed on the waitlist and is requesting a message be sent to doctor.  Name of Caller:pt  When is the first available appointment?  Symptoms:  Would the patient rather a call back or a response via [facility]? call  Best Call Back Number:[phone]  Additional Information:

## 2024-06-18 ENCOUNTER — LAB VISIT (OUTPATIENT)
Dept: LAB | Facility: HOSPITAL | Age: 74
End: 2024-06-18
Attending: INTERNAL MEDICINE
Payer: MEDICARE

## 2024-06-18 DIAGNOSIS — E78.2 MIXED HYPERLIPIDEMIA: ICD-10-CM

## 2024-06-18 LAB
ALBUMIN SERPL BCP-MCNC: 3.4 G/DL (ref 3.5–5.2)
ALP SERPL-CCNC: 92 U/L (ref 55–135)
ALT SERPL W/O P-5'-P-CCNC: 14 U/L (ref 10–44)
ANION GAP SERPL CALC-SCNC: 4 MMOL/L (ref 8–16)
AST SERPL-CCNC: 15 U/L (ref 10–40)
BILIRUB SERPL-MCNC: 0.8 MG/DL (ref 0.1–1)
BUN SERPL-MCNC: 26 MG/DL (ref 8–23)
CALCIUM SERPL-MCNC: 10.1 MG/DL (ref 8.7–10.5)
CHLORIDE SERPL-SCNC: 110 MMOL/L (ref 95–110)
CHOLEST SERPL-MCNC: 175 MG/DL (ref 120–199)
CHOLEST/HDLC SERPL: 3.7 {RATIO} (ref 2–5)
CO2 SERPL-SCNC: 27 MMOL/L (ref 23–29)
CREAT SERPL-MCNC: 1 MG/DL (ref 0.5–1.4)
EST. GFR  (NO RACE VARIABLE): 58.8 ML/MIN/1.73 M^2
GLUCOSE SERPL-MCNC: 86 MG/DL (ref 70–110)
HDLC SERPL-MCNC: 47 MG/DL (ref 40–75)
HDLC SERPL: 26.9 % (ref 20–50)
LDLC SERPL CALC-MCNC: 111.6 MG/DL (ref 63–159)
NONHDLC SERPL-MCNC: 128 MG/DL
POTASSIUM SERPL-SCNC: 4.8 MMOL/L (ref 3.5–5.1)
PROT SERPL-MCNC: 6.5 G/DL (ref 6–8.4)
SODIUM SERPL-SCNC: 141 MMOL/L (ref 136–145)
TRIGL SERPL-MCNC: 82 MG/DL (ref 30–150)

## 2024-06-18 PROCEDURE — 36415 COLL VENOUS BLD VENIPUNCTURE: CPT | Performed by: INTERNAL MEDICINE

## 2024-06-18 PROCEDURE — 80061 LIPID PANEL: CPT | Performed by: INTERNAL MEDICINE

## 2024-06-18 PROCEDURE — 80053 COMPREHEN METABOLIC PANEL: CPT | Performed by: INTERNAL MEDICINE

## 2024-06-19 ENCOUNTER — OFFICE VISIT (OUTPATIENT)
Dept: PRIMARY CARE CLINIC | Facility: CLINIC | Age: 74
End: 2024-06-19
Payer: MEDICARE

## 2024-06-19 VITALS
BODY MASS INDEX: 42.88 KG/M2 | HEIGHT: 65 IN | SYSTOLIC BLOOD PRESSURE: 126 MMHG | TEMPERATURE: 98 F | HEART RATE: 86 BPM | OXYGEN SATURATION: 97 % | DIASTOLIC BLOOD PRESSURE: 74 MMHG | WEIGHT: 257.38 LBS

## 2024-06-19 DIAGNOSIS — M79.89 SWELLING OF LOWER LEG: ICD-10-CM

## 2024-06-19 DIAGNOSIS — N18.31 CHRONIC RENAL IMPAIRMENT, STAGE 3A: ICD-10-CM

## 2024-06-19 DIAGNOSIS — I10 PRIMARY HYPERTENSION: Chronic | ICD-10-CM

## 2024-06-19 DIAGNOSIS — E78.2 MIXED HYPERLIPIDEMIA: ICD-10-CM

## 2024-06-19 DIAGNOSIS — E88.09 HYPOALBUMINEMIA: ICD-10-CM

## 2024-06-19 DIAGNOSIS — R73.03 PRE-DIABETES: ICD-10-CM

## 2024-06-19 DIAGNOSIS — Z76.89 ENCOUNTER TO ESTABLISH CARE WITH NEW DOCTOR: Primary | ICD-10-CM

## 2024-06-19 PROBLEM — I73.9 PERIPHERAL VASCULAR DISEASE, UNSPECIFIED: Status: ACTIVE | Noted: 2024-06-19

## 2024-06-19 LAB
ALBUMIN/CREAT UR: NORMAL UG/MG (ref 0–30)
BASOPHILS # BLD AUTO: 0.03 K/UL (ref 0–0.2)
BASOPHILS NFR BLD: 0.5 % (ref 0–1.9)
CREAT UR-MCNC: 111 MG/DL (ref 15–325)
DIFFERENTIAL METHOD BLD: ABNORMAL
EOSINOPHIL # BLD AUTO: 0.2 K/UL (ref 0–0.5)
EOSINOPHIL NFR BLD: 2.3 % (ref 0–8)
ERYTHROCYTE [DISTWIDTH] IN BLOOD BY AUTOMATED COUNT: 14.9 % (ref 11.5–14.5)
ESTIMATED AVG GLUCOSE: 117 MG/DL (ref 68–131)
HBA1C MFR BLD: 5.7 % (ref 4–5.6)
HCT VFR BLD AUTO: 39 % (ref 37–48.5)
HGB BLD-MCNC: 12.4 G/DL (ref 12–16)
IMM GRANULOCYTES # BLD AUTO: 0.02 K/UL (ref 0–0.04)
IMM GRANULOCYTES NFR BLD AUTO: 0.3 % (ref 0–0.5)
LYMPHOCYTES # BLD AUTO: 1.7 K/UL (ref 1–4.8)
LYMPHOCYTES NFR BLD: 26.7 % (ref 18–48)
MCH RBC QN AUTO: 30.5 PG (ref 27–31)
MCHC RBC AUTO-ENTMCNC: 31.8 G/DL (ref 32–36)
MCV RBC AUTO: 96 FL (ref 82–98)
MICROALBUMIN UR DL<=1MG/L-MCNC: <5 UG/ML
MONOCYTES # BLD AUTO: 0.5 K/UL (ref 0.3–1)
MONOCYTES NFR BLD: 8.1 % (ref 4–15)
NEUTROPHILS # BLD AUTO: 4 K/UL (ref 1.8–7.7)
NEUTROPHILS NFR BLD: 62.1 % (ref 38–73)
NRBC BLD-RTO: 0 /100 WBC
PLATELET # BLD AUTO: 200 K/UL (ref 150–450)
PMV BLD AUTO: 12.6 FL (ref 9.2–12.9)
RBC # BLD AUTO: 4.06 M/UL (ref 4–5.4)
WBC # BLD AUTO: 6.41 K/UL (ref 3.9–12.7)

## 2024-06-19 PROCEDURE — 82570 ASSAY OF URINE CREATININE: CPT | Performed by: FAMILY MEDICINE

## 2024-06-19 PROCEDURE — 85025 COMPLETE CBC W/AUTO DIFF WBC: CPT | Performed by: FAMILY MEDICINE

## 2024-06-19 PROCEDURE — 99999 PR PBB SHADOW E&M-EST. PATIENT-LVL III: CPT | Mod: PBBFAC,,, | Performed by: FAMILY MEDICINE

## 2024-06-19 PROCEDURE — 83036 HEMOGLOBIN GLYCOSYLATED A1C: CPT | Performed by: FAMILY MEDICINE

## 2024-06-19 RX ORDER — LIDOCAINE HYDROCHLORIDE 20 MG/ML
JELLY TOPICAL
Qty: 30 ML | Refills: 2 | Status: SHIPPED | OUTPATIENT
Start: 2024-06-19

## 2024-06-19 NOTE — PROGRESS NOTES
Amanda James  06/19/2024  [id number]    Nikki Timmons MD  Patient Care Team:  Nikki Timmons MD as PCP - General (Internal Medicine)  Tonya Hermosillo MA as Care Coordinator        [facility]      Chief Complaint:  Chief Complaint   Patient presents with    Establish Care    Results     Pt would like to discuss lab results          Assessment/Plan:  1. Encounter to establish care with new doctor    2. Mixed hyperlipidemia  Overview:  On atorvastatin 40 mg and fish oil    The 10-year ASCVD risk score (Андрей RABAGO, et al., 2019) is: 12.6%    Values used to calculate the score:      Age: 74 years      Sex: Female      Is Non- : Yes      Diabetic: No      Tobacco smoker: No      Systolic Blood Pressure: 126 mmHg      Is BP treated: Yes      HDL Cholesterol: 47 mg/dL      Total Cholesterol: 175 mg/dL     Assessment & Plan:  Continue current dose of liptor      3. Primary hypertension  Overview:  Olmesartan 40 mg daily, bumex 0.5 mg- bp at goal 126/74    Assessment & Plan:  Continue current bp meds, continue monitoring    Orders:  -     Microalbumin/Creatinine Ratio, Urine; Future; Expected date: 06/19/2024    4. Chronic renal impairment, stage 3a  Comments:  new to patient. check urine for microalbumin.   on olmesartan 40 mg- cont  Orders:  -     CBC Auto Differential; Future; Expected date: 06/19/2024    5. Pre-diabetes  -     HEMOGLOBIN A1C; Future; Expected date: 06/19/2024    6. Hypoalbuminemia  Comments:  mild,  contributing to nonpitting leg edema?  check urine level  Orders:  -     Microalbumin/Creatinine Ratio, Urine; Future; Expected date: 06/19/2024    7. Swelling of lower leg  Comments:  hx venous insufficiency, mildly subnormal albumin    Other orders  -     LIDOcaine HCL 2% (XYLOCAINE) 2 % jelly; Apply topically as needed. Apply topically once nightly to affected part of foot/feet.  Dispense: 30 mL; Refill: 2      Worry Score: 3  History of Present  Illness:    HPI    Ms. James is a 74 year old female w/ HTN, venous insufficiency, preDM, CKD, dyslipidemia, obesity, IZABEL  who presents today to establish care. Denies acute complaints including HA, vision changes, SOB, CP, abdominal pain, urinary sxs, GI sxs.       Requests topical lidocaine gel refill for arthralgia. Iron pill refill for LIANE - last few years h/h normal. Would like me to decipher most recent lab with her  Results for orders placed or performed in visit on 06/18/24   Lipid Panel   Result Value Ref Range    Cholesterol 175 120 - 199 mg/dL    Triglycerides 82 30 - 150 mg/dL    HDL 47 40 - 75 mg/dL    LDL Cholesterol 111.6 63.0 - 159.0 mg/dL    HDL/Cholesterol Ratio 26.9 20.0 - 50.0 %    Total Cholesterol/HDL Ratio 3.7 2.0 - 5.0    Non-HDL Cholesterol 128 mg/dL   Comprehensive Metabolic Panel   Result Value Ref Range    Sodium 141 136 - 145 mmol/L    Potassium 4.8 3.5 - 5.1 mmol/L    Chloride 110 95 - 110 mmol/L    CO2 27 23 - 29 mmol/L    Glucose 86 70 - 110 mg/dL    BUN 26 (H) 8 - 23 mg/dL    Creatinine 1.0 0.5 - 1.4 mg/dL    Calcium 10.1 8.7 - 10.5 mg/dL    Total Protein 6.5 6.0 - 8.4 g/dL    Albumin 3.4 (L) 3.5 - 5.2 g/dL    Total Bilirubin 0.8 0.1 - 1.0 mg/dL    Alkaline Phosphatase 92 55 - 135 U/L    AST 15 10 - 40 U/L    ALT 14 10 - 44 U/L    eGFR 58.8 (A) >60 mL/min/1.73 m^2    Anion Gap 4 (L) 8 - 16 mmol/L               The following were reviewed: Active problem list, medication list, allergies, family history, social history, and Health Maintenance.     History:  Past Medical History:   Diagnosis Date    Allergy     Asthma     Hyperlipidemia     Hypertension     IZABEL (obstructive sleep apnea)     Pre-diabetes      Past Surgical History:   Procedure Laterality Date    BREAST BIOPSY Left     benign    BREAST MASS EXCISION Left     benign    COLONOSCOPY N/A 08/14/2019    Procedure: COLONOSCOPY;  Surgeon: Clinton Gottlieb MD;  Location: [address]);  Service: Endoscopy;  Laterality:  N/A;  BMI 47.45  IZABEL    ESOPHAGOGASTRODUODENOSCOPY N/A 08/14/2019    Procedure: EGD (ESOPHAGOGASTRODUODENOSCOPY);  Surgeon: Clinton Gottlieb MD;  Location: [address];  Service: Endoscopy;  Laterality: N/A;  BMI 47.45  IZABEL    HYSTERECTOMY      PARTIAL HYSTERECTOMY      uterus removed     Family History   Problem Relation Name Age of Onset    Hypertension Mother      Coronary artery disease Father      Coronary artery disease Brother      Breast cancer Paternal Aunt      Colon cancer Neg Hx      Inflammatory bowel disease Neg Hx      Crohn's disease Neg Hx      Ulcerative colitis Neg Hx       Patient Active Problem List   Diagnosis    HTN (hypertension)    Pre-diabetes    Severe obesity (BMI >= 40)    Vitamin D deficiency    Iron deficiency anemia    History of gastrointestinal ulcer    Anemia    Venous reflux    Lymphedema of both lower extremities    Venous insufficiency of both lower extremities    Edema of both lower extremities    Mixed hyperlipidemia    DDD (degenerative disc disease), lumbar    Chronic bilateral low back pain without sciatica    Peripheral vascular disease, unspecified     Review of patient's allergies indicates:   Allergen Reactions    Ibuprofen     Aspirin Other (See Comments)       Medications:  Current Outpatient Medications on File Prior to Visit   Medication Sig Dispense Refill    atorvastatin (LIPITOR) 40 MG tablet Take 1 tablet (40 mg total) by mouth once daily. 90 tablet 3    bumetanide (BUMEX) 0.5 MG Tab TAKE 1 TABLET ONE TIME DAILY 90 tablet 10    ergocalciferol (ERGOCALCIFEROL) 50,000 unit Cap TAKE 1 CAPSULE EVERY 7 DAYS 12 capsule 3    ferrous sulfate (FEROSUL) 325 mg (65 mg iron) Tab tablet Take 1 tablet (325 mg total) by mouth once daily. 90 tablet 3    fluticasone propionate (FLONASE) 50 mcg/actuation nasal spray 1 spray (50 mcg total) by Each Nostril route once daily. 16 g 11    montelukast (SINGULAIR) 10 mg tablet Take 1 tablet (10 mg total) by mouth once daily. 90  tablet 3    olmesartan (BENICAR) 40 MG tablet Take 1 tablet (40 mg total) by mouth once daily. 90 tablet 3    [DISCONTINUED] LIDOcaine HCL 2% (XYLOCAINE) 2 % jelly Apply topically as needed. Apply topically once nightly to affected part of foot/feet. 30 mL 2    albuterol (VENTOLIN HFA) 90 mcg/actuation inhaler Inhale 2 puffs into the lungs every 6 (six) hours as needed for Wheezing. Rescue (Patient not taking: Reported on 8/8/2023) 1 g 1    cholecalciferol, vitamin D3, 50,000 unit capsule TK 1 C PO WEEKLY (Patient not taking: Reported on 6/19/2024)  3     No current facility-administered medications on file prior to visit.       Medications have been reviewed and reconciled with patient at visit today.      Exam:  Vitals:    06/19/24 1340   BP: 126/74   Pulse: 86   Temp: 97.9 °F (36.6 °C)     Weight: 116.7 kg (257 lb 6.2 oz)   Body mass index is 42.83 kg/m².      BP Readings from Last 3 Encounters:   06/19/24 126/74   02/19/24 136/76   12/13/23 (!) 176/95     Wt Readings from Last 3 Encounters:   06/19/24 1340 116.7 kg (257 lb 6.2 oz)   02/19/24 0938 115.4 kg (254 lb 6.6 oz)   12/13/23 1317 110.7 kg (244 lb)        REVIEW OF SYSTEMS  Review of Systems     Physical Exam  Vitals and nursing note reviewed.   Constitutional:       General: She is not in acute distress.     Appearance: Normal appearance. She is obese. She is not toxic-appearing.   Cardiovascular:      Rate and Rhythm: Normal rate and regular rhythm.      Pulses: Normal pulses.      Heart sounds: Normal heart sounds.   Pulmonary:      Effort: Pulmonary effort is normal.      Breath sounds: Normal breath sounds.   Musculoskeletal:         General: Swelling present.      Right lower leg: Edema (nonpitting) present.      Left lower leg: Edema (nonpitting) present.   Skin:     General: Skin is warm.      Capillary Refill: Capillary refill takes less than 2 seconds.   Neurological:      General: No focal deficit present.      Mental Status: She is alert and  oriented to person, place, and time.   Psychiatric:         Mood and Affect: Mood normal.          Laboratory Reviewed:     Lab Results   Component Value Date    WBC 8.71 10/07/2022    HGB 13.6 10/07/2022    HCT 44.1 10/07/2022     10/07/2022    CHOL 175 06/18/2024    TRIG 82 06/18/2024    HDL 47 06/18/2024    ALT 14 06/18/2024    AST 15 06/18/2024     06/18/2024    K 4.8 06/18/2024     06/18/2024    CREATININE 1.0 06/18/2024    BUN 26 (H) 06/18/2024    CO2 27 06/18/2024    TSH 1.890 10/07/2022    HGBA1C 6.2 (H) 10/07/2022       Screening or Prevention Patient's value Goal Complete/Controlled?   HgA1C Testing and Control   Lab Results   Component Value Date    HGBA1C 6.2 (H) 10/07/2022      Annually/Less than 8% No   Lipid profile : 06/18/2024 Annually Yes   LDL control Lab Results   Component Value Date    LDLCALC 111.6 06/18/2024    Annually/Less than 100 mg/dl  No   Nephropathy screening Lab Results   Component Value Date    LABMICR 8.0 06/04/2014     Lab Results   Component Value Date    PROTEINUA Negative 10/07/2021    Annually No   Blood pressure BP Readings from Last 1 Encounters:   06/19/24 126/74    Less than 140/90 Yes   Dilated retinal exam Most Recent Eye Exam Date: Not Found Annually Yes   Foot exam   Most Recent Foot Exam Date: Not Found Annually Yes       Health Maintenance  Health Maintenance Topics with due status: Not Due       Topic Last Completion Date    Colorectal Cancer Screening 08/14/2019    TETANUS VACCINE 05/17/2020    Lipid Panel 06/18/2024    Influenza Vaccine Not Due     Health Maintenance Due   Topic Date Due    Shingles Vaccine (1 of 2) Never done    RSV Vaccine (Age 60+ and Pregnant patients) (1 - 1-dose 60+ series) Never done    DEXA Scan  05/07/2022    Hemoglobin A1c (Prediabetes)  10/07/2023    COVID-19 Vaccine (6 - 2023-24 season) 02/24/2024               -Patient's lab results were reviewed and discussed with patient  -Treatment options and alternatives were  discussed with the patient. Patient expressed understanding. Patient was given the opportunity to ask questions and be an active participant in their medical care. Patient had no further questions or concerns at this time.         No future appointments.         After visit summary printed and given to patient upon discharge.  Patient goals and care plan are included in After visit summary.      The following issues were discussed: The primary encounter diagnosis was Encounter to establish care with new doctor. Diagnoses of Mixed hyperlipidemia, Primary hypertension, Chronic renal impairment, stage 3a, Pre-diabetes, Hypoalbuminemia, and Swelling of lower leg were also pertinent to this visit.    Health maintenance needs, recent test results and goals of care discussed with pt and questions answered.           MIKAL Panda, NP-C  [facility] [address] [place] [address]

## 2024-06-19 NOTE — PATIENT INSTRUCTIONS
RSV and shingles vaccine due - schedule at local pharmacy  Lab today  Refill lidocaine    If you are feeling unwell, we'd like to be the first ones to know here at [facility]! Please give us a call. Same day appointments are our top priority to keep you well and out of the emergency rooms and hospitals. Call [phone] for our direct line. After hours advice is always available. Please call [phone] after hours to speak to the on-call team.

## 2024-07-31 ENCOUNTER — PATIENT MESSAGE (OUTPATIENT)
Dept: RESEARCH | Facility: HOSPITAL | Age: 74
End: 2024-07-31
Payer: MEDICARE

## 2024-09-27 ENCOUNTER — OFFICE VISIT (OUTPATIENT)
Dept: PRIMARY CARE CLINIC | Facility: CLINIC | Age: 74
End: 2024-09-27
Payer: MEDICARE

## 2024-09-27 VITALS
WEIGHT: 260.06 LBS | DIASTOLIC BLOOD PRESSURE: 72 MMHG | HEART RATE: 79 BPM | TEMPERATURE: 98 F | OXYGEN SATURATION: 98 % | BODY MASS INDEX: 43.33 KG/M2 | SYSTOLIC BLOOD PRESSURE: 132 MMHG | HEIGHT: 65 IN

## 2024-09-27 DIAGNOSIS — E55.9 VITAMIN D DEFICIENCY: ICD-10-CM

## 2024-09-27 DIAGNOSIS — M72.2 PLANTAR FASCIAL FIBROMATOSIS OF RIGHT FOOT: ICD-10-CM

## 2024-09-27 DIAGNOSIS — H52.4 PRESBYOPIA: ICD-10-CM

## 2024-09-27 DIAGNOSIS — R73.03 PRE-DIABETES: ICD-10-CM

## 2024-09-27 DIAGNOSIS — E78.5 HYPERLIPIDEMIA, UNSPECIFIED HYPERLIPIDEMIA TYPE: Primary | ICD-10-CM

## 2024-09-27 DIAGNOSIS — N18.31 CHRONIC RENAL IMPAIRMENT, STAGE 3A: ICD-10-CM

## 2024-09-27 DIAGNOSIS — J30.2 SEASONAL ALLERGIES: ICD-10-CM

## 2024-09-27 LAB — MISCELLANEOUS TEST NAME: NORMAL

## 2024-09-27 PROCEDURE — 99999 PR PBB SHADOW E&M-EST. PATIENT-LVL V: CPT | Mod: PBBFAC,,, | Performed by: FAMILY MEDICINE

## 2024-09-27 RX ORDER — ATORVASTATIN CALCIUM 40 MG/1
40 TABLET, FILM COATED ORAL DAILY
Qty: 90 TABLET | Refills: 3 | Status: SHIPPED | OUTPATIENT
Start: 2024-09-27

## 2024-09-27 RX ORDER — LIDOCAINE HYDROCHLORIDE 20 MG/ML
JELLY TOPICAL
Qty: 30 ML | Refills: 2 | Status: SHIPPED | OUTPATIENT
Start: 2024-09-27

## 2024-09-27 RX ORDER — FERROUS SULFATE 325(65) MG
325 TABLET ORAL DAILY
Qty: 90 TABLET | Refills: 3 | Status: SHIPPED | OUTPATIENT
Start: 2024-09-27 | End: 2024-09-27 | Stop reason: ALTCHOICE

## 2024-09-27 RX ORDER — MONTELUKAST SODIUM 10 MG/1
10 TABLET ORAL DAILY
Qty: 90 TABLET | Refills: 3 | Status: SHIPPED | OUTPATIENT
Start: 2024-09-27

## 2024-09-27 RX ORDER — CETIRIZINE HYDROCHLORIDE 10 MG/1
10 TABLET ORAL
COMMUNITY
Start: 2024-09-17

## 2024-09-27 NOTE — PROGRESS NOTES
Amanda James  09/27/2024  [id number]    Concepcion Arredondo MD  Patient Care Team:  Concepcion Arredondo MD as PCP - General (Family Medicine)  Tonya Hermosillo MA as Care Coordinator  Future Appointments       Date Provider Specialty Appt Notes    10/10/2024 Varsha Moreno, DPM Podiatry Plantar fascial fibromatosis of right foot    10/18/2024 Justin Taveras, OD Ophthalmology Presbyopia [H52.4]    12/20/2024 Concepcion Arredondo MD Primary Care 3 month f/u             Hosp/ED/Urgent Care:    Visit Type:   Chief Complaint:  Chief Complaint   Patient presents with    Follow-up       History of Present Illness:   3 mo follow up chronic conditions and addressing care gaps.   Need glasses doctor, podiatry for plantar fasciitis tx.   Reports running nose, received flonase zyrtec from . On singulair and inhaler prn  Pre diabetes diet controlled  Ckd3a improving since atorvastatin, need refill   Bp at goal  Wt issue - does not want meds. Previous experience w diet pills not happy. She watchs her diet. More activities discussed    No falls. Appetite good. Lives in [place]     The following were reviewed: Active problem list, medication list, allergies, family history, social history, and Health Maintenance.     Medications have been reviewed and reconciled with patient at visit today.      Review of Systems   Respiratory:  Negative for shortness of breath.    Cardiovascular:  Negative for chest pain.      See HPI above  PHQ-4 Score: 0    Exam:  Vitals:    09/27/24 1425   BP: 132/72   Pulse: 79   Temp: 97.6 °F (36.4 °C)     Weight: 117.9 kg (260 lb 0.5 oz)   Body mass index is 43.27 kg/m².    BP Readings from Last 3 Encounters:   09/27/24 132/72   06/19/24 126/74   02/19/24 136/76        Wt Readings from Last 3 Encounters:   09/27/24 1425 117.9 kg (260 lb 0.5 oz)   06/19/24 1340 116.7 kg (257 lb 6.2 oz)   02/19/24 0938 115.4 kg (254 lb 6.6 oz)        Physical Exam  Vitals and nursing note reviewed.   Constitutional:       General:  She is not in acute distress.     Appearance: Normal appearance. She is obese. She is not toxic-appearing.   Cardiovascular:      Rate and Rhythm: Normal rate and regular rhythm.      Pulses: Normal pulses.      Heart sounds: Normal heart sounds.   Pulmonary:      Effort: Pulmonary effort is normal.      Breath sounds: Normal breath sounds.   Musculoskeletal:         General: Swelling present.      Right lower leg: Edema (nonpitting) present.      Left lower leg: Edema (nonpitting) present.   Skin:     General: Skin is warm.      Capillary Refill: Capillary refill takes less than 2 seconds.   Neurological:      General: No focal deficit present.      Mental Status: She is alert and oriented to person, place, and time.   Psychiatric:         Mood and Affect: Mood normal.          Laboratory Reviewed    Lab Results   Component Value Date    WBC 6.41 06/19/2024    HGB 12.4 06/19/2024    HCT 39.0 06/19/2024     06/19/2024    CHOL 175 06/18/2024    TRIG 82 06/18/2024    HDL 47 06/18/2024    ALT 14 06/18/2024    AST 15 06/18/2024     06/18/2024    K 4.8 06/18/2024     06/18/2024    CREATININE 1.0 06/18/2024    BUN 26 (H) 06/18/2024    CO2 27 06/18/2024    TSH 1.890 10/07/2022    HGBA1C 5.7 (H) 06/19/2024         Health Maintenance  Health Maintenance Topics with due status: Not Due       Topic Last Completion Date    Colorectal Cancer Screening 08/14/2019    TETANUS VACCINE 05/17/2020    Lipid Panel 06/18/2024    Hemoglobin A1c (Prediabetes) 06/19/2024    Annual UACr 06/19/2024     Health Maintenance Due   Topic Date Due    Shingles Vaccine (1 of 2) Never done    RSV Vaccine (Age 60+ and Pregnant patients) (1 - 1-dose 60+ series) Never done    DEXA Scan  05/07/2022    Influenza Vaccine (1) Never done    COVID-19 Vaccine (6 - 2023-24 season) 09/01/2024       Assessment and Plan   74 y.o. female with multiple co-morbid illnesses here for continued follow up of medical problems.      The primary encounter  diagnosis was Hyperlipidemia, unspecified hyperlipidemia type. Diagnoses of Vitamin D deficiency, Seasonal allergies, Plantar fascial fibromatosis of right foot, Presbyopia, Chronic renal impairment, stage 3a, and Pre-diabetes were also pertinent to this visit.  1. Hyperlipidemia, unspecified hyperlipidemia type  -     atorvastatin (LIPITOR) 40 MG tablet; Take 1 tablet (40 mg total) by mouth once daily.  Dispense: 90 tablet; Refill: 3    2. Vitamin D deficiency  -     montelukast (SINGULAIR) 10 mg tablet; Take 1 tablet (10 mg total) by mouth once daily.  Dispense: 90 tablet; Refill: 3  -     Misc Sendout Test, Blood vit d level; Future; Expected date: 09/27/2024    3. Seasonal allergies  -     montelukast (SINGULAIR) 10 mg tablet; Take 1 tablet (10 mg total) by mouth once daily.  Dispense: 90 tablet; Refill: 3    4. Plantar fascial fibromatosis of right foot  -     Ambulatory referral/consult to Podiatry; Future; Expected date: 10/04/2024    5. Presbyopia  -     Ambulatory referral/consult to Optometry; Future; Expected date: 10/04/2024    6. Chronic renal impairment, stage 3a  Comments:  eGFR improving 58 now. on atorvastatin 40, olmesartan 40  Overview:  eGFR improving 58 now      7. Pre-diabetes  Comments:  diet controlled, doing well    Other orders  -     Discontinue: ferrous sulfate (FEROSUL) 325 mg (65 mg iron) Tab tablet; Take 1 tablet (325 mg total) by mouth once daily.  Dispense: 90 tablet; Refill: 3  -     LIDOcaine HCL 2% (XYLOCAINE) 2 % jelly; Apply topically as needed. Apply topically once nightly to affected part of foot/feet.  Dispense: 30 mL; Refill: 2        Health Maintenance         Date Due Completion Date    Shingles Vaccine (1 of 2) Never done ---    RSV Vaccine (Age 60+ and Pregnant patients) (1 - 1-dose 60+ series) Never done ---    DEXA Scan 05/07/2022 5/7/2019    Influenza Vaccine (1) Never done ---    COVID-19 Vaccine (6 - 2023-24 season) 09/01/2024 10/24/2023    Hemoglobin A1c  (Prediabetes) 06/19/2025 6/19/2024    Annual UACr 06/19/2025 6/19/2024    Lipid Panel 06/18/2029 6/18/2024    Colorectal Cancer Screening 08/14/2029 8/14/2019    TETANUS VACCINE 05/17/2030 5/17/2020            Discharge instructions        -Patient's lab results were reviewed and discussed with patient  -Treatment options and alternatives were discussed with the patient. Patient expressed understanding. Patient was given the opportunity to ask questions and be an active participant in their medical care. Patient had no further questions or concerns at this time.     Follow up: Follow up in about 3 months (around 12/27/2024).

## 2024-09-27 NOTE — PATIENT INSTRUCTIONS
Continue Flonase and zyrtec and Singulair  Normal saline nasal drop to help drain sinus  Refer to optometrist  Refer to podiatry  Flu vaccine    If you are feeling unwell, we'd like to be the first ones to know here at [facility]! Please give us a call. Same day appointments are our top priority to keep you well and out of the emergency rooms and hospitals. Call [phone] for our direct line. After hours advice is always available. Please call [phone] after hours to speak to the on-call team.

## 2024-10-03 DIAGNOSIS — E55.9 VITAMIN D DEFICIENCY: Primary | ICD-10-CM

## 2024-10-03 RX ORDER — CETIRIZINE HYDROCHLORIDE 10 MG/1
10 TABLET ORAL DAILY
Qty: 30 TABLET | Refills: 2 | Status: SHIPPED | OUTPATIENT
Start: 2024-10-03

## 2024-10-03 RX ORDER — ERGOCALCIFEROL 1.25 MG/1
50000 CAPSULE ORAL
Qty: 4 CAPSULE | Refills: 1 | Status: SHIPPED | OUTPATIENT
Start: 2024-10-03 | End: 2024-11-28

## 2024-10-10 ENCOUNTER — OFFICE VISIT (OUTPATIENT)
Dept: PODIATRY | Facility: CLINIC | Age: 74
End: 2024-10-10
Payer: MEDICARE

## 2024-10-10 VITALS — BODY MASS INDEX: 43.31 KG/M2 | HEIGHT: 65 IN | WEIGHT: 259.94 LBS

## 2024-10-10 DIAGNOSIS — M79.671 FOOT PAIN, RIGHT: ICD-10-CM

## 2024-10-10 DIAGNOSIS — I87.2 VENOUS INSUFFICIENCY OF BOTH LOWER EXTREMITIES: ICD-10-CM

## 2024-10-10 DIAGNOSIS — M25.571 SINUS TARSI SYNDROME, RIGHT: Primary | ICD-10-CM

## 2024-10-10 PROCEDURE — 99999 PR PBB SHADOW E&M-EST. PATIENT-LVL III: CPT | Mod: PBBFAC,,, | Performed by: PODIATRIST

## 2024-10-10 RX ORDER — FERROUS SULFATE TAB 325 MG (65 MG ELEMENTAL FE) 325 (65 FE) MG
TAB ORAL
COMMUNITY
Start: 2024-10-07

## 2024-10-10 NOTE — PROGRESS NOTES
Podiatry Department    Patient ID: Amanda James is a 74 y.o. female.    Chief Complaint: Plantar Fasciitis (C/o PF of the right foot, pt rates pain 6/10, pt states she has pain all the time,  pt states the pain started years ago , pt is non-diabetic)    History of Present Illness    CHIEF COMPLAINT:  Patient presents today for foot pain.    FOOT PAIN AND ORTHOTIC USE:  She reports right foot pain, specifically in the ankle crease, but denies pain on the bottom of the foot. She has flat feet and has always experienced foot pain. She uses a standalone orthotic device with arch support to alleviate discomfort. She typically purchases [employer] brand orthotic shoes and mentions another brand that requires special ordering. These shoes provide some relief, but effectiveness is inconsistent. She declined injection for foot pain management.    LYMPHEDEMA AND EDEMA:  She reports bilateral lower extremity swelling due to lymphedema, which initially affected one leg but has now progressed to both. She attributes the swelling to a previous job that required prolonged sitting at night. She is unable to wear compression socks due to difficulty putting them on. She reports pain in the crease of her legs. She is aware of potential complications associated with lymphedema, including the development of wounds and foot and leg pain.    MOBILITY AND BALANCE:  She uses a cane for balance assistance due to a history of falls and clumsiness. She started using the cane as she got older to prevent further falls. She denies using a walker.      ROS:  Musculoskeletal: -muscle pain, -joint pain            Physical Exam    Extremities: Palpable pedal pulses. +2 pitting edema.  MSK: Foot/Ankle - Right: Tenderness to palpation on right ankle sinus tarsi, lateral gutter. Maximally pronated foot type. Relaxed calcaneal stance position and eversion. No pain upon palpation of right plantar heel and medial arch.           Diagnoses:  1. Sinus  tarsi syndrome, right    2. Foot pain, right  -     Ambulatory referral/consult to Podiatry    3. Venous insufficiency of both lower extremities        Assessment & Plan    - Patient presents with right foot pain, likely due to sinus tarsi syndrome secondary to flat feet and pronation  - Conservative management with custom orthotics and appropriate footwear recommended as initial treatment  - Injection offered but declined by patient    FOOT AND ANKLE PAIN:  - Educated patient on the relationship between flat feet, pronation, and ankle pain.  - Discussed the importance of proper footwear and orthotics in managing foot pain and improving stability.  - Prescription for custom orthotics.  - Referred to [facility] for custom orthotics and shoe fitting.    LOWER EXTREMITY EDEMA:  - Recommend considering use of compression stockings to manage lower extremity edema.  - Informed patient about the availability of assistive devices for putting on compression stockings.    FALL PREVENTION:  - Patient to continue using cane for balance and fall prevention.              Future Appointments   Date Time Provider Department Center   10/18/2024  9:30 AM Justin Taveras OD [facility]   12/20/2024  3:40 PM Concepcion Arredondo MD [facility]        This note was generated with the assistance of ambient listening technology. Verbal consent was obtained by the patient and accompanying visitor(s) for the recording of patient appointment to facilitate this note. I attest to having reviewed and edited the generated note for accuracy, though some syntax or spelling errors may persist. Please contact the author of this note for any clarification.      Report Electronically Signed By:     Varsha Moreno DPM   Podiatry  [facility]  10/10/2024

## 2024-11-13 RX ORDER — BUMETANIDE 0.5 MG/1
0.5 TABLET ORAL
Qty: 90 TABLET | Refills: 3 | Status: SHIPPED | OUTPATIENT
Start: 2024-11-13

## 2024-12-16 ENCOUNTER — HOSPITAL ENCOUNTER (OUTPATIENT)
Dept: RADIOLOGY | Facility: OTHER | Age: 74
Discharge: HOME | End: 2024-12-16
Attending: FAMILY MEDICINE
Payer: MEDICARE

## 2024-12-16 DIAGNOSIS — Z12.31 ENCOUNTER FOR SCREENING MAMMOGRAM FOR BREAST CANCER: ICD-10-CM

## 2024-12-16 PROCEDURE — 77067 SCR MAMMO BI INCL CAD: CPT | Mod: TC

## 2024-12-16 PROCEDURE — 77067 SCR MAMMO BI INCL CAD: CPT | Mod: 26,,, | Performed by: RADIOLOGY

## 2024-12-16 PROCEDURE — 77063 BREAST TOMOSYNTHESIS BI: CPT | Mod: 26,,, | Performed by: RADIOLOGY

## 2025-04-07 ENCOUNTER — OFFICE VISIT (OUTPATIENT)
Dept: URGENT CARE | Facility: CLINIC | Age: 75
End: 2025-04-07
Payer: MEDICARE

## 2025-04-07 VITALS
SYSTOLIC BLOOD PRESSURE: 115 MMHG | HEART RATE: 69 BPM | OXYGEN SATURATION: 98 % | TEMPERATURE: 98 F | RESPIRATION RATE: 18 BRPM | HEIGHT: 65 IN | BODY MASS INDEX: 43.15 KG/M2 | WEIGHT: 259 LBS | DIASTOLIC BLOOD PRESSURE: 78 MMHG

## 2025-04-07 DIAGNOSIS — J45.901 EXACERBATION OF ASTHMA, UNSPECIFIED ASTHMA SEVERITY, UNSPECIFIED WHETHER PERSISTENT: ICD-10-CM

## 2025-04-07 DIAGNOSIS — J06.9 VIRAL URI WITH COUGH: Primary | ICD-10-CM

## 2025-04-07 DIAGNOSIS — J34.89 SINUS PRESSURE: ICD-10-CM

## 2025-04-07 LAB
CTP QC/QA: YES
SARS CORONAVIRUS 2 ANTIGEN: NEGATIVE

## 2025-04-07 PROCEDURE — 87811 SARS-COV-2 COVID19 W/OPTIC: CPT | Mod: QW,S$GLB,,

## 2025-04-07 PROCEDURE — 99213 OFFICE O/P EST LOW 20 MIN: CPT | Mod: S$GLB,,,

## 2025-04-07 RX ORDER — BENZONATATE 200 MG/1
200 CAPSULE ORAL 3 TIMES DAILY PRN
Qty: 30 CAPSULE | Refills: 0 | Status: SHIPPED | OUTPATIENT
Start: 2025-04-07 | End: 2025-04-17

## 2025-04-07 RX ORDER — CETIRIZINE HYDROCHLORIDE 10 MG/1
10 TABLET ORAL DAILY
Qty: 30 TABLET | Refills: 0 | Status: SHIPPED | OUTPATIENT
Start: 2025-04-07 | End: 2025-05-07

## 2025-04-07 RX ORDER — PREDNISONE 20 MG/1
40 TABLET ORAL DAILY
Qty: 10 TABLET | Refills: 0 | Status: SHIPPED | OUTPATIENT
Start: 2025-04-07 | End: 2025-04-12

## 2025-04-07 RX ORDER — ALBUTEROL SULFATE 90 UG/1
2 INHALANT RESPIRATORY (INHALATION) EVERY 6 HOURS PRN
Qty: 18 G | Refills: 0 | Status: SHIPPED | OUTPATIENT
Start: 2025-04-07 | End: 2025-04-14

## 2025-04-07 NOTE — PATIENT INSTRUCTIONS
- Rest.    - Drink plenty of fluids. Increasing your fluid intake will help loosen up mucous.  - Viral upper respiratory infections typically run their course in 10-14 days.      CONGESTION:  - You can take over-the-counter claritin, zyrtec, allegra, OR xyzal as directed. These are antihistamines that can help with runny nose, nasal congestion, sneezing, and helps to dry up post-nasal drip, which usually causes sore throat and cough.   - You can use Flonase (fluticasone) nasal spray as directed for sinus congestion and postnasal drip. This is a steroid nasal spray that works locally over time to decrease the inflammation in your nose/sinuses and help with allergic symptoms. This is not an quick- relief spray like afrin, but it works well if used daily.  Discontinue if you develop nose bleed  - Use nasal saline prior to Flonase.  - Use Ocean Spray Nasal Saline 1-3 puffs each nostril every 2-3 hours then blow out onto tissue. This is to irrigate the nasal passage way to clear the sinus openings. Use until sinus problem resolved.  - You can also try NasalCrom nasal spray, which has been shown to help reduce duration of symptoms when started within 24 hours of symptom onset. Okay to use with Flonase and other allergy medications.   - A Neti Pot with sterile saline can help break up nasal congestion and give relief.     COUGH:  - You can take Delsym to help with cough.     SORE THROAT:   - Chloraseptic throat spray can help numb the throat.   - Warm salt water gargles can help with sore throat.  - Warm tea with honey can help with sore throat and cough. Honey is a natural cough suppressant.     - Acetaminophen (tylenol) or Ibuprofen (advil,motrin) as directed as needed for fever/pain. Avoid tylenol if you have a history of liver disease. Do not take ibuprofen if you have a history of GI bleeding, kidney disease, or if you take blood thinners.   - Ibuprofen dosing for adults: 400 mg by mouth every 4-6 hours as needed.  Max: 2400 mg/day; Info: use lowest effective dose, shortest effective treatment duration; give w/ food if GI upset occurs.  - Tylenol dosing for adults: [By mouth route, immediate-release form] Dose: 325-1000 mg by mouth every 4-6h as needed; Max: 1 g/4h and 4 g/day from all sources. [By mouth route, extended-release form] Dose: 650-1300 mg Extended Release by mouth every 8h as needed; Max: 4 g/day from all sources.     - You must understand that you have received an Urgent Care treatment only and that you may be released before all of your medical problems are known or treated.   - You, the patient, will arrange for follow up care as instructed.   - If your condition worsens or fails to improve we recommend that you receive another evaluation at the ER immediately or contact your PCP to discuss your concerns or return here.   - Follow up with your PCP or specialty clinic as directed in the next 1-2 weeks if not improved or as needed.  You can call [phone] to schedule an appointment with the appropriate provider.    If your symptoms do not improve or worsen, go to the emergency room immediately.

## 2025-04-07 NOTE — PROGRESS NOTES
"Subjective:      Patient ID: Amanda James is a 75 y.o. female.    Vitals:  height is 5' 5" (1.651 m) and weight is 117.5 kg (259 lb). Her oral temperature is 98.1 °F (36.7 °C). Her blood pressure is 115/78 and her pulse is 69. Her respiration is 18 and oxygen saturation is 98%.     Chief Complaint: Sinus Problem    Patient presents for sinus pressure and congestion that started on Friday. She states symptoms are staying the same. She is also having SOB and wheezing that is relieved by breathing treatments. She is out of her inhaler. She denies any fever, body aches or chills.     Sinus Problem  This is a new problem. The current episode started in the past 7 days (Friday). The problem has been gradually worsening since onset. Associated symptoms include congestion, coughing, shortness of breath, sinus pressure, sneezing and a sore throat. Pertinent negatives include no chills, diaphoresis, ear pain, headaches, hoarse voice, neck pain or swollen glands. Treatments tried: breathing treatment, portillo rené. The treatment provided no relief.       Constitution: Negative for chills and sweating.   HENT:  Positive for congestion, sinus pressure and sore throat. Negative for ear pain.    Neck: Negative for neck pain.   Respiratory:  Positive for cough, sputum production, shortness of breath, wheezing and asthma.    Allergic/Immunologic: Positive for asthma and sneezing.   Neurological:  Negative for headaches, disorientation and altered mental status.   Psychiatric/Behavioral:  Negative for altered mental status and disorientation.       Objective:     Physical Exam   Constitutional: She is oriented to person, place, and time. She appears well-developed. She is cooperative.  Non-toxic appearance. She does not appear ill. No distress.      Comments:Patient sits comfortably in exam chair. Answers questions in complete sentences. Does not show any signs of distress or discoloration.        HENT:   Head: Normocephalic and " atraumatic.   Ears:   Right Ear: Hearing, tympanic membrane, external ear and ear canal normal. no impacted cerumen  Left Ear: Hearing, tympanic membrane, external ear and ear canal normal. no impacted cerumen  Nose: Rhinorrhea and congestion present. No mucosal edema or nasal deformity. No epistaxis. Right sinus exhibits no maxillary sinus tenderness and no frontal sinus tenderness. Left sinus exhibits no maxillary sinus tenderness and no frontal sinus tenderness.   Mouth/Throat: Uvula is midline and mucous membranes are normal. No trismus in the jaw. Normal dentition. No uvula swelling. Posterior oropharyngeal erythema present. No oropharyngeal exudate or posterior oropharyngeal edema.   Eyes: Conjunctivae and lids are normal. No scleral icterus.   Neck: Trachea normal and phonation normal. Neck supple. No edema present. No erythema present. No neck rigidity present.   Cardiovascular: Normal rate, regular rhythm, normal heart sounds and normal pulses.   Pulmonary/Chest: Effort normal. No stridor. No respiratory distress. She has no decreased breath sounds. She has wheezes (diffuse). She has no rhonchi. She has no rales. She exhibits no tenderness.   Abdominal: Normal appearance.   Musculoskeletal: Normal range of motion.         General: No deformity. Normal range of motion.   Neurological: She is alert and oriented to person, place, and time. She exhibits normal muscle tone. Coordination normal.   Skin: Skin is warm, dry, intact, not diaphoretic and not pale.   Psychiatric: Her speech is normal and behavior is normal. Judgment and thought content normal.   Nursing note and vitals reviewed.    Results for orders placed or performed in visit on 04/07/25   SARS Coronavirus 2 Antigen, POCT Manual Read    Collection Time: 04/07/25 12:43 PM   Result Value Ref Range    SARS Coronavirus 2 Antigen Negative Negative, Presumptive Negative     Acceptable Yes        Assessment:     1. Viral URI with cough    2.  Sinus pressure    3. Exacerbation of asthma, unspecified asthma severity, unspecified whether persistent        Plan:   No x ray available in clinic today. Wheezing likely asthma exacerbation aggravated by viral URI. Especially in the absence of systemic symptoms. Will treat with oral steroids and inhaler. Advise patient stick to a low sugar and salt diet while taking steroids. Patient expressed understanding and agrees with plan. Strict Ed precautions for new or worsening symptoms.     Viral URI with cough  -     cetirizine (ZYRTEC) 10 MG tablet; Take 1 tablet (10 mg total) by mouth once daily.  Dispense: 30 tablet; Refill: 0  -     benzonatate (TESSALON) 200 MG capsule; Take 1 capsule (200 mg total) by mouth 3 (three) times daily as needed for Cough.  Dispense: 30 capsule; Refill: 0    Sinus pressure  -     SARS Coronavirus 2 Antigen, POCT Manual Read    Exacerbation of asthma, unspecified asthma severity, unspecified whether persistent  -     albuterol (VENTOLIN HFA) 90 mcg/actuation inhaler; Inhale 2 puffs into the lungs every 6 (six) hours as needed for Wheezing. Rescue  Dispense: 18 g; Refill: 0  -     predniSONE (DELTASONE) 20 MG tablet; Take 2 tablets (40 mg total) by mouth once daily. for 5 days  Dispense: 10 tablet; Refill: 0                Patient Instructions   - Rest.    - Drink plenty of fluids. Increasing your fluid intake will help loosen up mucous.  - Viral upper respiratory infections typically run their course in 10-14 days.      CONGESTION:  - You can take over-the-counter claritin, zyrtec, allegra, OR xyzal as directed. These are antihistamines that can help with runny nose, nasal congestion, sneezing, and helps to dry up post-nasal drip, which usually causes sore throat and cough.   - You can use Flonase (fluticasone) nasal spray as directed for sinus congestion and postnasal drip. This is a steroid nasal spray that works locally over time to decrease the inflammation in your nose/sinuses and  help with allergic symptoms. This is not an quick- relief spray like afrin, but it works well if used daily.  Discontinue if you develop nose bleed  - Use nasal saline prior to Flonase.  - Use Ocean Spray Nasal Saline 1-3 puffs each nostril every 2-3 hours then blow out onto tissue. This is to irrigate the nasal passage way to clear the sinus openings. Use until sinus problem resolved.  - You can also try NasalCrom nasal spray, which has been shown to help reduce duration of symptoms when started within 24 hours of symptom onset. Okay to use with Flonase and other allergy medications.   - A Neti Pot with sterile saline can help break up nasal congestion and give relief.     COUGH:  - You can take Delsym to help with cough.     SORE THROAT:   - Chloraseptic throat spray can help numb the throat.   - Warm salt water gargles can help with sore throat.  - Warm tea with honey can help with sore throat and cough. Honey is a natural cough suppressant.     - Acetaminophen (tylenol) or Ibuprofen (advil,motrin) as directed as needed for fever/pain. Avoid tylenol if you have a history of liver disease. Do not take ibuprofen if you have a history of GI bleeding, kidney disease, or if you take blood thinners.   - Ibuprofen dosing for adults: 400 mg by mouth every 4-6 hours as needed. Max: 2400 mg/day; Info: use lowest effective dose, shortest effective treatment duration; give w/ food if GI upset occurs.  - Tylenol dosing for adults: [By mouth route, immediate-release form] Dose: 325-1000 mg by mouth every 4-6h as needed; Max: 1 g/4h and 4 g/day from all sources. [By mouth route, extended-release form] Dose: 650-1300 mg Extended Release by mouth every 8h as needed; Max: 4 g/day from all sources.     - You must understand that you have received an Urgent Care treatment only and that you may be released before all of your medical problems are known or treated.   - You, the patient, will arrange for follow up care as instructed.    - If your condition worsens or fails to improve we recommend that you receive another evaluation at the ER immediately or contact your PCP to discuss your concerns or return here.   - Follow up with your PCP or specialty clinic as directed in the next 1-2 weeks if not improved or as needed.  You can call [phone] to schedule an appointment with the appropriate provider.    If your symptoms do not improve or worsen, go to the emergency room immediately.

## 2025-05-29 ENCOUNTER — OFFICE VISIT (OUTPATIENT)
Dept: PRIMARY CARE CLINIC | Facility: CLINIC | Age: 75
End: 2025-05-29
Payer: MEDICARE

## 2025-05-29 VITALS
SYSTOLIC BLOOD PRESSURE: 138 MMHG | HEIGHT: 65 IN | WEIGHT: 267.31 LBS | OXYGEN SATURATION: 94 % | TEMPERATURE: 98 F | DIASTOLIC BLOOD PRESSURE: 70 MMHG | RESPIRATION RATE: 20 BRPM | BODY MASS INDEX: 44.54 KG/M2 | HEART RATE: 77 BPM

## 2025-05-29 DIAGNOSIS — R73.03 PRE-DIABETES: ICD-10-CM

## 2025-05-29 DIAGNOSIS — E78.2 MIXED HYPERLIPIDEMIA: ICD-10-CM

## 2025-05-29 DIAGNOSIS — Z78.0 ENCOUNTER FOR OSTEOPOROSIS SCREENING IN ASYMPTOMATIC POSTMENOPAUSAL PATIENT: ICD-10-CM

## 2025-05-29 DIAGNOSIS — Z13.820 ENCOUNTER FOR OSTEOPOROSIS SCREENING IN ASYMPTOMATIC POSTMENOPAUSAL PATIENT: ICD-10-CM

## 2025-05-29 DIAGNOSIS — E55.9 VITAMIN D DEFICIENCY: ICD-10-CM

## 2025-05-29 DIAGNOSIS — B37.31 YEAST VAGINITIS: ICD-10-CM

## 2025-05-29 DIAGNOSIS — N18.31 CHRONIC RENAL IMPAIRMENT, STAGE 3A: ICD-10-CM

## 2025-05-29 DIAGNOSIS — M72.2 PLANTAR FASCIAL FIBROMATOSIS OF RIGHT FOOT: Primary | ICD-10-CM

## 2025-05-29 DIAGNOSIS — E66.01 SEVERE OBESITY (BMI >= 40): ICD-10-CM

## 2025-05-29 LAB
ALBUMIN SERPL BCP-MCNC: 3.4 G/DL (ref 3.5–5.2)
ALP SERPL-CCNC: 86 UNIT/L (ref 40–150)
ALT SERPL W/O P-5'-P-CCNC: 13 UNIT/L (ref 10–44)
ANION GAP (OHS): 8 MMOL/L (ref 8–16)
AST SERPL-CCNC: 14 UNIT/L (ref 11–45)
BILIRUB SERPL-MCNC: 0.6 MG/DL (ref 0.1–1)
BUN SERPL-MCNC: 28 MG/DL (ref 8–23)
CALCIUM SERPL-MCNC: 9.8 MG/DL (ref 8.7–10.5)
CHLORIDE SERPL-SCNC: 111 MMOL/L (ref 95–110)
CHOLEST SERPL-MCNC: 161 MG/DL (ref 120–199)
CHOLEST/HDLC SERPL: 3.6 {RATIO} (ref 2–5)
CO2 SERPL-SCNC: 24 MMOL/L (ref 23–29)
CREAT SERPL-MCNC: 1.1 MG/DL (ref 0.5–1.4)
EAG (OHS): 120 MG/DL (ref 68–131)
GFR SERPLBLD CREATININE-BSD FMLA CKD-EPI: 52 ML/MIN/1.73/M2
GLUCOSE SERPL-MCNC: 86 MG/DL (ref 70–110)
HBA1C MFR BLD: 5.8 % (ref 4–5.6)
HDLC SERPL-MCNC: 45 MG/DL (ref 40–75)
HDLC SERPL: 28 % (ref 20–50)
LDLC SERPL CALC-MCNC: 105.2 MG/DL (ref 63–159)
NONHDLC SERPL-MCNC: 116 MG/DL
POTASSIUM SERPL-SCNC: 4.3 MMOL/L (ref 3.5–5.1)
PROT SERPL-MCNC: 6.5 GM/DL (ref 6–8.4)
SODIUM SERPL-SCNC: 143 MMOL/L (ref 136–145)
TRIGL SERPL-MCNC: 54 MG/DL (ref 30–150)

## 2025-05-29 PROCEDURE — 80053 COMPREHEN METABOLIC PANEL: CPT | Performed by: FAMILY MEDICINE

## 2025-05-29 PROCEDURE — 1101F PT FALLS ASSESS-DOCD LE1/YR: CPT | Mod: CPTII,S$GLB,, | Performed by: FAMILY MEDICINE

## 2025-05-29 PROCEDURE — 1125F AMNT PAIN NOTED PAIN PRSNT: CPT | Mod: CPTII,S$GLB,, | Performed by: FAMILY MEDICINE

## 2025-05-29 PROCEDURE — 99215 OFFICE O/P EST HI 40 MIN: CPT | Mod: S$GLB,,, | Performed by: FAMILY MEDICINE

## 2025-05-29 PROCEDURE — 83036 HEMOGLOBIN GLYCOSYLATED A1C: CPT | Performed by: FAMILY MEDICINE

## 2025-05-29 PROCEDURE — G2211 COMPLEX E/M VISIT ADD ON: HCPCS | Mod: S$GLB,,, | Performed by: FAMILY MEDICINE

## 2025-05-29 PROCEDURE — 1159F MED LIST DOCD IN RCRD: CPT | Mod: CPTII,S$GLB,, | Performed by: FAMILY MEDICINE

## 2025-05-29 PROCEDURE — 80061 LIPID PANEL: CPT | Performed by: FAMILY MEDICINE

## 2025-05-29 PROCEDURE — 3078F DIAST BP <80 MM HG: CPT | Mod: CPTII,S$GLB,, | Performed by: FAMILY MEDICINE

## 2025-05-29 PROCEDURE — 99999 PR PBB SHADOW E&M-EST. PATIENT-LVL V: CPT | Mod: PBBFAC,,, | Performed by: FAMILY MEDICINE

## 2025-05-29 PROCEDURE — 3075F SYST BP GE 130 - 139MM HG: CPT | Mod: CPTII,S$GLB,, | Performed by: FAMILY MEDICINE

## 2025-05-29 PROCEDURE — 1160F RVW MEDS BY RX/DR IN RCRD: CPT | Mod: CPTII,S$GLB,, | Performed by: FAMILY MEDICINE

## 2025-05-29 PROCEDURE — 3288F FALL RISK ASSESSMENT DOCD: CPT | Mod: CPTII,S$GLB,, | Performed by: FAMILY MEDICINE

## 2025-05-29 RX ORDER — ERGOCALCIFEROL 1.25 MG/1
50000 CAPSULE ORAL
Qty: 12 CAPSULE | Refills: 1 | Status: SHIPPED | OUTPATIENT
Start: 2025-05-29

## 2025-05-29 RX ORDER — MULTIVITAMIN WITH IRON
1 TABLET ORAL DAILY
COMMUNITY

## 2025-05-29 RX ORDER — AZELASTINE 1 MG/ML
1 SPRAY, METERED NASAL DAILY
COMMUNITY

## 2025-05-29 RX ORDER — FLUCONAZOLE 150 MG/1
150 TABLET ORAL DAILY
Qty: 1 TABLET | Refills: 1 | Status: SHIPPED | OUTPATIENT
Start: 2025-05-29

## 2025-05-29 NOTE — ASSESSMENT & PLAN NOTE
Exercise ability limited by orthopedic issues  Discussed ozempic. She will think about it  Cont life style management

## 2025-05-29 NOTE — PATIENT INSTRUCTIONS
Refill vit D  Take bumex daily, to keep leg swelling under control  Rx diflucan for yeast infection  Call podiatry for follow up appointment  Bring ACP paper back next visit   Please think about ozempic     If you are feeling unwell, we'd like to be the first ones to know here at [facility]! Please give us a call. Same day appointments are our top priority to keep you well and out of the emergency rooms and hospitals. Call [phone] for our direct line. After hours advice is always available. Please call [phone] after hours to speak to the on-call team.

## 2025-05-29 NOTE — ASSESSMENT & PLAN NOTE
Lab Results   Component Value Date    HGBA1C 5.7 (H) 06/19/2024    HGBA1C 6.2 (H) 10/07/2022    HGBA1C 6.1 (H) 04/29/2021    Lab today

## 2025-05-29 NOTE — PROGRESS NOTES
Amanda James  05/29/2025  [id number]    Concepcion Arredondo MD  Patient Care Team:  Concepcion Arredondo MD as PCP - General (Family Medicine)  Future Appointments       Date Provider Specialty Appt Notes    7/24/2025 Concepcion Arredondo MD Primary Care 2 mth f/u             Chief Complaint:  Chief Complaint   Patient presents with    Follow-up       History of Present Illness:   Pt presents to clinic today for routine follow-up.  Doing well since last visit, and has no specific or new complaints.   Laboratory studies and medications were reviewed.  Care gaps addressed. All primary care related questions were answered to patient's satisfaction.   Last visit 9/2024. Pt goes between [place] and [place] staying with 2 daughters. Uses [facility] in both locations.     Review of Systems   Constitutional: Negative.  Overweight hindered by foot issues unable to exercises. Does not want Rx for weight loss  HENT: Negative.     Respiratory: Negative.  Dx with asthma. No wheezing/sob lately  Cardiovascular: Negative.    Gastrointestinal: Negative.    Genitourinary: vag itch, after taking steroids given by  for URI symptoms 2 weeks ago  Musculoskeletal: Negative.  Right knee and ankle pain 2/2 plantar fasciitis + arche collapsing, podiatry seen her, orthotic shoes not helping. Encouraged her to follow up.   Skin: Lower leg swelling. Everytime eat out. Skipping bumex when traveling or going out     Recent Fall:  []Yes  [x]No  Activity:  []Vigorous []Moderate [x]Sedentary  Appetite:  []Good  [x]Fair  []Poor  Mood: [x]Stable []Anxious []Depressed   Insomnia: []Yes  []No decades of graveyard shifts  Weight stable [x]Yes  []No  Bowel/bladder control []Yes  [x]No  ACP - pamphlet given    Review of Systems   See HPI above  No data recorded    Exam:  Vitals:    05/29/25 1311   BP: 138/70   Pulse: 77   Resp: 20   Temp: 98.2 °F (36.8 °C)     Weight: 121.2 kg (267 lb 4.9 oz)   Body mass index is 44.48 kg/m².    BP Readings from Last 3 Encounters:    05/29/25 138/70   04/07/25 115/78   09/27/24 132/72        Wt Readings from Last 3 Encounters:   05/29/25 1311 121.2 kg (267 lb 4.9 oz)   04/07/25 1234 117.5 kg (259 lb)   10/10/24 1029 117.9 kg (259 lb 14.8 oz)        Physical Exam  Vitals and nursing note reviewed.   Constitutional:       General: She is not in acute distress.     Appearance: Normal appearance. She is obese. She is not toxic-appearing.   HENT:      Head: Normocephalic and atraumatic.   Cardiovascular:      Rate and Rhythm: Normal rate and regular rhythm.      Pulses: Normal pulses.      Heart sounds: Normal heart sounds.   Pulmonary:      Effort: Pulmonary effort is normal.      Breath sounds: Normal breath sounds.   Musculoskeletal:      Right lower leg: Edema (tr pitting) present.      Left lower leg: Edema (tr pitting) present.   Skin:     General: Skin is warm.      Capillary Refill: Capillary refill takes less than 2 seconds.   Neurological:      General: No focal deficit present.      Mental Status: She is alert and oriented to person, place, and time.   Psychiatric:         Mood and Affect: Mood normal.          Laboratory Reviewed    Lab Results   Component Value Date    WBC 6.41 06/19/2024    HGB 12.4 06/19/2024    HCT 39.0 06/19/2024     06/19/2024    CHOL 175 06/18/2024    TRIG 82 06/18/2024    HDL 47 06/18/2024    ALT 14 06/18/2024    AST 15 06/18/2024     06/18/2024    K 4.8 06/18/2024     06/18/2024    CREATININE 1.0 06/18/2024    BUN 26 (H) 06/18/2024    CO2 27 06/18/2024    TSH 1.890 10/07/2022    HGBA1C 5.7 (H) 06/19/2024         Health Maintenance  Health Maintenance Topics with due status: Not Due       Topic Last Completion Date    TETANUS VACCINE 05/17/2020    Lipid Panel 06/18/2024    Hemoglobin A1c (Prediabetes) 06/19/2024    Influenza Vaccine Not Due     Health Maintenance Due   Topic Date Due    Shingles Vaccine (1 of 2) Never done    DEXA Scan  05/07/2022    RSV Vaccine (Age 60+ and Pregnant patients)  (1 - 1-dose 75+ series) Never done    COVID-19 Vaccine (6 - 2024-25 season) 09/01/2024       Assessment and Plan   75 y.o. female with multiple co-morbid illnesses here for continued follow up of medical problems.      The primary encounter diagnosis was Plantar fascial fibromatosis of right foot. Diagnoses of Severe obesity (BMI >= 40), Chronic renal impairment, stage 3a, Vitamin D deficiency, Encounter for osteoporosis screening in asymptomatic postmenopausal patient, Pre-diabetes, Mixed hyperlipidemia, and Yeast vaginitis were also pertinent to this visit.  1. Plantar fascial fibromatosis of right foot    2. Severe obesity (BMI >= 40)  Overview:  Declined Rx  Life style management    Assessment & Plan:  Exercise ability limited by orthopedic issues  Discussed ozempic. She will think about it  Cont life style management      3. Chronic renal impairment, stage 3a  Overview:  eGFR improving 58 now  On olmesartan 40, bumetadine 0.5, atorvastatin 40 daily    Assessment & Plan:  Last egfr 58 9/2024 - Trending up. Repeat lab today    Orders:  -     Comprehensive Metabolic Panel; Future; Expected date: 05/29/2025    4. Vitamin D deficiency  Assessment & Plan:  Last value was 22. Repeat today. Refill supplements    Orders:  -     ergocalciferol (ERGOCALCIFEROL) 50,000 unit Cap; Take 1 capsule (50,000 Units total) by mouth every 7 days.  Dispense: 12 capsule; Refill: 1    5. Encounter for osteoporosis screening in asymptomatic postmenopausal patient  -     DEXA Bone Density Axial Skeleton 1 or more Site W TBS XPD; Future; Expected date: 05/29/2025    6. Pre-diabetes  Overview:  Life style management    Assessment & Plan:  Lab Results   Component Value Date    HGBA1C 5.7 (H) 06/19/2024    HGBA1C 6.2 (H) 10/07/2022    HGBA1C 6.1 (H) 04/29/2021    Lab today    Orders:  -     Hemoglobin A1C; Future; Expected date: 05/29/2025    7. Mixed hyperlipidemia  Overview:  On atorvastatin 40 mg and fish oil    The 10-year ASCVD risk  score (Андрей RABAGO, et al., 2019) is: 12.6%    Values used to calculate the score:      Age: 74 years      Sex: Female      Is Non- : Yes      Diabetic: No      Tobacco smoker: No      Systolic Blood Pressure: 126 mmHg      Is BP treated: Yes      HDL Cholesterol: 47 mg/dL      Total Cholesterol: 175 mg/dL     Assessment & Plan:  Continue current dose of liptor. Repeat lab today    Orders:  -     Lipid Panel; Future; Expected date: 05/29/2025    8. Yeast vaginitis  -     fluconazole (DIFLUCAN) 150 MG Tab; Take 1 tablet (150 mg total) by mouth once daily.  Dispense: 1 tablet; Refill: 1    I spent a total of 45 minutes on the day of the visit.  This includes face to face time and non-face to face time preparing to see the patient (eg, review of tests), obtaining and/or reviewing separately obtained history, documenting clinical information in the electronic or other health record, independently interpreting results and communicating results to the patient/family/caregiver, or care coordinator.     Health Maintenance         Date Due Completion Date    Shingles Vaccine (1 of 2) Never done ---    DEXA Scan 05/07/2022 5/7/2019    RSV Vaccine (Age 60+ and Pregnant patients) (1 - 1-dose 75+ series) Never done ---    COVID-19 Vaccine (6 - 2024-25 season) 09/01/2024 10/24/2023    Hemoglobin A1c (Prediabetes) 06/19/2025 6/19/2024    Influenza Vaccine (Season Ended) 09/01/2025 ---    Lipid Panel 06/18/2029 6/18/2024    TETANUS VACCINE 05/17/2030 5/17/2020            Discharge instructions     After visit summary printed and given to patient upon discharge. Health maintenance needs, recent test results, current management plans and goals of care discussed with patient/family. All questions answered. Pt and family are encouraged to be part of care team at all times     Follow up: Follow up in about 2 months (around 7/29/2025).

## 2025-05-30 ENCOUNTER — RESULTS FOLLOW-UP (OUTPATIENT)
Dept: PRIMARY CARE CLINIC | Facility: CLINIC | Age: 75
End: 2025-05-30

## 2025-06-06 DIAGNOSIS — E78.5 HYPERLIPIDEMIA, UNSPECIFIED HYPERLIPIDEMIA TYPE: ICD-10-CM

## 2025-06-06 DIAGNOSIS — B37.31 YEAST VAGINITIS: ICD-10-CM

## 2025-06-06 DIAGNOSIS — J30.2 SEASONAL ALLERGIES: ICD-10-CM

## 2025-06-06 DIAGNOSIS — E55.9 VITAMIN D DEFICIENCY: ICD-10-CM

## 2025-06-06 RX ORDER — MONTELUKAST SODIUM 10 MG/1
10 TABLET ORAL DAILY
Qty: 90 TABLET | Refills: 3 | Status: SHIPPED | OUTPATIENT
Start: 2025-06-06

## 2025-06-06 RX ORDER — FLUCONAZOLE 150 MG/1
150 TABLET ORAL DAILY
Qty: 1 TABLET | Refills: 1 | OUTPATIENT
Start: 2025-06-06

## 2025-06-06 RX ORDER — CETIRIZINE HYDROCHLORIDE 10 MG/1
10 TABLET ORAL DAILY
Qty: 30 TABLET | Refills: 2 | Status: SHIPPED | OUTPATIENT
Start: 2025-06-06

## 2025-06-06 RX ORDER — ATORVASTATIN CALCIUM 40 MG/1
40 TABLET, FILM COATED ORAL DAILY
Qty: 90 TABLET | Refills: 3 | Status: SHIPPED | OUTPATIENT
Start: 2025-06-06

## 2025-08-01 ENCOUNTER — OFFICE VISIT (OUTPATIENT)
Dept: URGENT CARE | Facility: CLINIC | Age: 75
End: 2025-08-01
Payer: MEDICARE

## 2025-08-01 VITALS
DIASTOLIC BLOOD PRESSURE: 69 MMHG | TEMPERATURE: 97 F | OXYGEN SATURATION: 97 % | RESPIRATION RATE: 19 BRPM | HEIGHT: 65 IN | HEART RATE: 63 BPM | WEIGHT: 267 LBS | BODY MASS INDEX: 44.48 KG/M2 | SYSTOLIC BLOOD PRESSURE: 133 MMHG

## 2025-08-01 DIAGNOSIS — T36.95XA ANTIBIOTIC-INDUCED YEAST INFECTION: ICD-10-CM

## 2025-08-01 DIAGNOSIS — H65.192 ACUTE NON-SUPPURATIVE OTITIS MEDIA, LEFT: Primary | ICD-10-CM

## 2025-08-01 DIAGNOSIS — B37.9 ANTIBIOTIC-INDUCED YEAST INFECTION: ICD-10-CM

## 2025-08-01 RX ORDER — FLUCONAZOLE 150 MG/1
TABLET ORAL
Qty: 2 TABLET | Refills: 0 | Status: SHIPPED | OUTPATIENT
Start: 2025-08-01

## 2025-08-01 RX ORDER — AMOXICILLIN 875 MG/1
875 TABLET, COATED ORAL 2 TIMES DAILY
Qty: 20 TABLET | Refills: 0 | Status: SHIPPED | OUTPATIENT
Start: 2025-08-01 | End: 2025-08-11

## 2025-08-01 NOTE — PROGRESS NOTES
"Subjective:      Patient ID: Amanda James is a 75 y.o. female.      Chief Complaint: Otalgia    This is a 75 y.o. female who presents today with a chief complaint of left ear pain that started last night. Pt states she hasn't used any medication to resolve this issue.    Otalgia   There is pain in the left ear. This is a new problem. The current episode started yesterday. The problem occurs constantly. The problem has been gradually worsening. There has been no fever. The pain is at a severity of 7/10. The pain is moderate. Associated symptoms include a sore throat. Pertinent negatives include no coughing, ear discharge, hearing loss or rash. She has tried nothing for the symptoms.       Constitution: Negative for activity change, appetite change and chills.   HENT:  Positive for ear pain and sore throat. Negative for ear discharge, tinnitus, hearing loss and congestion.    Cardiovascular:  Negative for chest pain.   Respiratory:  Negative for cough and shortness of breath.    Skin:  Negative for rash.      Objective:     Vitals:    08/01/25 1109   BP: 133/69   BP Location: Right arm   Patient Position: Sitting   Pulse: 63   Resp: 19   Temp: 97.1 °F (36.2 °C)   TempSrc: Tympanic   SpO2: 97%   Weight: 121.1 kg (267 lb)   Height: 5' 5" (1.651 m)      Physical Exam   Constitutional: She is oriented to person, place, and time.  Non-toxic appearance. She does not appear ill. No distress.   HENT:   Head: Atraumatic.   Ears:   Right Ear: Tympanic membrane normal.   Left Ear: Tympanic membrane is erythematous.   Nose: Congestion present.   Mouth/Throat: No oropharyngeal exudate or posterior oropharyngeal erythema.   Eyes: Conjunctivae are normal.   Cardiovascular: Normal rate.   Pulmonary/Chest: Effort normal.   Lymphadenopathy:     She has no cervical adenopathy.   Neurological: She is alert and oriented to person, place, and time.   Skin: Skin is not diaphoretic.   Psychiatric: Judgment and thought content normal. "       Assessment:     1. Acute non-suppurative otitis media, left    2. Antibiotic-induced yeast infection        Plan:       Acute non-suppurative otitis media, left  -     amoxicillin (AMOXIL) 875 MG tablet; Take 1 tablet (875 mg total) by mouth 2 (two) times daily. for 10 days  Dispense: 20 tablet; Refill: 0    2. Antibiotic-induced yeast infection  -     fluconazole (DIFLUCAN) 150 MG Tab; Take 1 pill now. May repeat dose after 72 hrs for max efficacy.  Dispense: 2 tablet; Refill: 0

## 2025-08-18 ENCOUNTER — OFFICE VISIT (OUTPATIENT)
Dept: PODIATRY | Facility: CLINIC | Age: 75
End: 2025-08-18
Payer: MEDICARE

## 2025-08-18 VITALS
DIASTOLIC BLOOD PRESSURE: 75 MMHG | SYSTOLIC BLOOD PRESSURE: 121 MMHG | BODY MASS INDEX: 44.01 KG/M2 | HEART RATE: 74 BPM | WEIGHT: 264.13 LBS | HEIGHT: 65 IN

## 2025-08-18 DIAGNOSIS — M12.871 TRANSIENT ARTHROPATHY INVOLVING RIGHT ANKLE AND FOOT: Primary | ICD-10-CM

## 2025-08-18 DIAGNOSIS — R29.6 FREQUENT FALLS: ICD-10-CM

## 2025-08-18 DIAGNOSIS — R73.03 PRE-DIABETES: ICD-10-CM

## 2025-08-18 DIAGNOSIS — R60.0 EDEMA OF BOTH LOWER EXTREMITIES: ICD-10-CM

## 2025-08-18 PROCEDURE — 99999 PR PBB SHADOW E&M-EST. PATIENT-LVL IV: CPT | Mod: PBBFAC,,, | Performed by: PODIATRIST

## 2025-08-18 RX ORDER — DICLOFENAC SODIUM 10 MG/G
2 GEL TOPICAL 4 TIMES DAILY
Qty: 350 G | Refills: 2 | Status: SHIPPED | OUTPATIENT
Start: 2025-08-18

## 2025-08-20 ENCOUNTER — PATIENT MESSAGE (OUTPATIENT)
Dept: PRIMARY CARE CLINIC | Facility: CLINIC | Age: 75
End: 2025-08-20
Payer: MEDICARE